# Patient Record
Sex: FEMALE | Race: BLACK OR AFRICAN AMERICAN | NOT HISPANIC OR LATINO | Employment: OTHER | URBAN - METROPOLITAN AREA
[De-identification: names, ages, dates, MRNs, and addresses within clinical notes are randomized per-mention and may not be internally consistent; named-entity substitution may affect disease eponyms.]

---

## 2017-02-02 ENCOUNTER — HOSPITAL ENCOUNTER (EMERGENCY)
Facility: HOSPITAL | Age: 57
Discharge: HOME | End: 2017-02-02
Attending: EMERGENCY MEDICINE
Payer: COMMERCIAL

## 2017-02-02 VITALS
RESPIRATION RATE: 18 BRPM | WEIGHT: 293 LBS | DIASTOLIC BLOOD PRESSURE: 84 MMHG | HEART RATE: 98 BPM | SYSTOLIC BLOOD PRESSURE: 184 MMHG | OXYGEN SATURATION: 98 % | TEMPERATURE: 99 F | BODY MASS INDEX: 47.09 KG/M2 | HEIGHT: 66 IN

## 2017-02-02 DIAGNOSIS — M54.50 CHRONIC LOW BACK PAIN WITHOUT SCIATICA, UNSPECIFIED BACK PAIN LATERALITY: Primary | ICD-10-CM

## 2017-02-02 DIAGNOSIS — G89.29 CHRONIC LOW BACK PAIN WITHOUT SCIATICA, UNSPECIFIED BACK PAIN LATERALITY: Primary | ICD-10-CM

## 2017-02-02 PROCEDURE — 99283 EMERGENCY DEPT VISIT LOW MDM: CPT

## 2017-02-02 NOTE — ED AVS SNAPSHOT
[facility] -   [id number] [place] [id number]               Roslyn Conde   2017  9:43 PM   ED    Description:  Female : 1960   Department:  [facility] -            Your Care was Coordinated By:     Provider Role From To    Maribell Curry MD Attending Provider 17 2143 --      Reason for Visit     Back Pain           Diagnoses this Visit        Comments    Chronic low back pain without sciatica, unspecified back pain laterality    -  Primary       ED Disposition     ED Disposition Condition Comment    Discharge             To Do List           Follow-up Information     Follow up with [facility] In 4 days.    Contact information:    [facility] [address]  [phone]          Follow up with [facility].    Specialty:  Emergency Medicine    Why:  As needed, If symptoms worsen    Contact information:    [address] [place] [id number] [phone]      [facility]     [facility] -  Assistance  Registered nurses in the [facility] provide clinical advisement, health education, appointment booking, and other advisory services.  Call for this free service at [phone].             Medications           Message regarding Medications     Verify the changes and/or additions to your medication regime listed below are the same as discussed with your clinician today.  If any of these changes or additions are incorrect, please notify your healthcare provider.             Verify that the below list of medications is an accurate representation of the medications you are currently taking.  If none reported, the list may be blank. If incorrect, please contact your healthcare provider. Carry this list with you in case of emergency.           Current Medications     amlodipine (NORVASC) 5 MG tablet Take 5 mg by mouth. 1 Tablet  "Oral Every day    gabapentin (NEURONTIN) 300 MG capsule Take 300 mg by mouth 2 (two) times daily.    hydrochlorothiazide (HYDRODIURIL) 25 MG tablet Take 25 mg by mouth once daily.    hydrocodone-acetaminophen (LORTAB)  mg per tablet     ibuprofen-famotidine (DUEXIS) 800-26.6 mg Tab Take by mouth 3 (three) times daily.    losartan (COZAAR) 50 MG tablet Take 50 mg by mouth 2 (two) times daily.    naproxen (NAPROSYN) 500 MG tablet Take 1 tablet (500 mg total) by mouth 2 (two) times daily with meals.    predniSONE (DELTASONE) 20 MG tablet Take 20 mg by mouth once daily.    tizanidine (ZANAFLEX) 6 mg capsule Take 6 mg by mouth 3 (three) times daily as needed.           Clinical Reference Information           Your Vitals Were     BP Pulse Temp Resp Height Weight    188/88 (BP Location: Right arm, Patient Position: Sitting) 102 98.7 °F (37.1 °C) (Oral) 20 5' 6" (1.676 m) 147 kg (324 lb)    SpO2 BMI             96% 52.29 kg/m2         Allergies as of 2/2/2017     No Known Allergies      Immunizations Administered on Date of Encounter - 2/2/2017     None      ED Micro, Lab, POCT     None      ED Imaging Orders     Start Ordered       Status Ordering Provider    02/02/17 2152 02/02/17 2151  X-Ray Lumbar Spine Complete 5 View  1 time imaging      Final result         Discharge Instructions         Back Pain (Acute or Chronic)    Back pain is one of the most common problems. The good news is that most people feel better in 1 to 2 weeks, and most of the rest in 1 to 2 months. Most people can remain active.  People experience and describe pain differently; not everyone is the same.  · The pain can be sharp, stabbing, shooting, aching, cramping or burning.  · Movement, standing, bending, lifting, sitting, or walking may worsen pain.  · It can be localized to one spot or area, or it can be more generalized.  · It can spread or radiate upwards, to the front, or go down your arms or legs (sciatica).  · It can cause muscle " spasm.  Most of the time, mechanical problems with the muscles or spine cause the pain. Mechanical problems are usually caused by an injury to the muscles or ligaments. While illness can cause back pain, it is usually not caused by a serious illness. Mechanical problems include:   · Physical activity such as sports, exercise, work, or normal activity  · Overexertion, lifting, pushing, pulling incorrectly or too aggressively  · Sudden twisting, bending, or stretching from an accident, or accidental movement  · Poor posture  · Stretching or moving wrong, without noticing pain at the time  · Poor coordination, lack of regular exercise (check with your doctor about this)  · Spinal disc disease or arthritis  · Stress  Pain can also be related to pregnancy, or illness like appendicitis, bladder or kidney infections, pelvic infections, and many other things.  Acute back pain usually gets better in 1 to 2 weeks. Back pain related to disk disease, arthritis in the spinal joints or spinal stenosis (narrowing of the spinal canal) can become chronic and last for months or years.  Unless you had a physical injury (for example, a car accident or fall) X-rays are usually not needed for the initial evaluation of back pain. If pain continues and does not respond to medical treatment, X-rays and other tests may be needed.  Home care  Try these home care recommendations:  · When in bed, try to find a position of comfort. A firm mattress is best. Try lying flat on your back with pillows under your knees. You can also try lying on your side with your knees bent up towards your chest and a pillow between your knees.  · At first, do not try to stretch out the sore spots. If there is a strain, it is not like the good soreness you get after exercising without an injury. In this case, stretching may make it worse.  · Avoid prolong sitting, long car rides, or travel. This puts more stress on the lower back than standing or walking.  · During  the first 24 to 72 hours after an acute injury or flare up of chronic back pain, apply an ice pack to the painful area for 20 minutes and then remove it for 20 minutes. Do this over a period of 60 to 90 minutes or several times a day. This will reduce swelling and pain. Wrap the ice pack in a thin towel or plastic to protect your skin.  · You can start with ice, then switch to heat. Heat (hot shower, hot bath, or heating pad) reduces pain and works well for muscle spasms. Heat can be applied to the painful area for 20 minutes then remove it for 20 minutes. Do this over a period of 60 to 90 minutes or several times a day. Do not sleep on a heating pad. It can lead to skin burns or tissue damage.  · You can alternate ice and heat therapy. Talk with your doctor about the best treatment for your back pain.  · Therapeutic massage can help relax the back muscles without stretching them.  · Be aware of safe lifting methods and do not lift anything without stretching first.  Medicines  Talk to your doctor before using medicine, especially if you have other medical problems or are taking other medicines.  · You may use over-the-counter medicine as directed on the bottle to control pain, unless another pain medicine was prescribed. If you have chronic conditions like diabetes, liver or kidney disease, stomach ulcers, or gastrointestinal bleeding, or are taking blood thinners, talk to your doctor before taking any medicine.  · Be careful if you are given a prescription medicines, narcotics, or medicine for muscle spasms. They can cause drowsiness, affect your coordination, reflexes, and judgement. Do not drive or operate heavy machinery.  Follow-up care  Follow up with your healthcare provider, or as advised.   A radiologist will review any X-rays that were taken. Your provide will notify you of any new findings that may affect your care.  Call 911  Call emergency services if any of the following occur:  · Trouble  breathing  · Confusion  · Very drowsy or trouble awakening  · Fainting or loss of consciousness  · Rapid or very slow heart rate  · Loss of bowel or bladder control  When to seek medical advice  Call your healthcare provider right away if any of these occur:   · Pain becomes worse or spreads to your legs  · Weakness or numbness in one or both legs  · Numbness in the groin or genital area  Date Last Reviewed: 7/1/2016 © 2000-2016 [employer]. [address] [place] [address], [place] [address]. All rights reserved. This information is not intended as a substitute for professional medical care. Always follow your healthcare professional's instructions.          Back Care Tips    Caring for your back  These are things you can do to prevent a recurrence of acute back pain and to reduce symptoms from chronic back pain:  · Maintain a healthy weight. If you are overweight, losing weight will help most types of back pain.  · Exercise is an important part of recovery from most types of back pain. The muscles behind and in front of the spine support the back. This means strengthening both the back muscles and the abdominal muscles will provide better support for your spine.   · Swimming and brisk walking are good overall exercises to improve your fitness level.  · Practice safe lifting methods (below).  · Practice good posture when sitting, standing and walking. Avoid prolonged sitting. This puts more stress on the lower back than standing or walking.  · Wear quality shoes with sufficient arch support. Foot and ankle alignment can affect back symptoms. Women should avoid wearing high heels.  · Therapeutic massage can help relax the back muscles without stretching them.  · During the first 24 to 72 hours after an acute injury or flare-up of chronic back pain, apply an ice pack to the painful area for 20 minutes and then remove it for 20 minutes, over a period of 60 to 90 minutes, or several times a day. As a safety  precaution, do not use a heating pad at bedtime. Sleeping on a heating pad can lead to skin burns or tissue damage.  · You can alternate ice and heat therapies.  Medications  Talk to your healthcare provider before using medicines, especially if you have other medical problems or are taking other medicines.  · You may use acetaminophen or ibuprofen to control pain, unless your healthcare provider prescribed other pain medicine. If you have chronic conditions like diabetes, liver or kidney disease, stomach ulcers, or gastrointestinal bleeding, or are taking blood thinners, talk with your healthcare provider before taking any medicines.  · Be careful if you are given prescription pain medicines, narcotics, or medicine for muscle spasm. They can cause drowsiness, affect your coordination, reflexes, and judgment. Do not drive or operate heavy machinery while taking these types of medicines. Take prescription pain medicine only as prescribed by your healthcare provider.  Lumbar stretch  Here is a simple stretching exercise that will help relax muscle spasm and keep your back more limber. If exercise makes your back pain worse, dont do it.  · Lie on your back with your knees bent and both feet on the ground.  · Slowly raise your left knee to your chest as you flatten your lower back against the floor. Hold for 5 seconds.  · Relax and repeat the exercise with your right knee.  · Do 10 of these exercises for each leg.  Safe lifting method  · Dont bend over at the waist to lift an object off the floor.  Instead, bend your knees and hips in a squat.   · Keep your back and head upright  · Hold the object close to your body, directly in front of you.  · Straighten your legs to lift the object.   · Lower the object to the floor in the reverse fashion.  · If you must slide something across the floor, push it.  Posture tips  Sitting  Sit in chairs with straight backs or low-back support. Keep your knees lower than your hips,  with your feet flat on the floor.  When driving, sit up straight. Adjust the seat forward so you are not leaning toward the steering wheel.  A small pillow or rolled towel behind your lower back may help if you are driving long distances.   Standing  When standing for long periods, shift most of your weight to one leg at a time. Alternate legs every few minutes.   Sleeping  The best way to sleep is on your side with your knees bent. Put a low pillow under your head to support your neck in a neutral spine position. Avoid thick pillows that bend your neck to one side. Put a pillow between your legs to further relax your lower back. If you sleep on your back, put pillows under your knees to support your legs in a slightly flexed position. Use a firm mattress. If your mattress sags, replace it, or use a 1/2-inch plywood board under the mattress to add support.  Follow-up care  Follow up with your healthcare provider, or as advised.  If X-rays, a CT scan or an MRI scan were taken, they will be reviewed by a radiologist. You will be notified of any new findings that may affect your care.  Call 911  Seek emergency medical care if any of the following occur:  · Trouble breathing  · Confusion  · Very drowsy  · Fainting or loss of consciousness  · Rapid or very slow heart rate  · Loss of  bowel or bladder control  When to seek medical care  Call your healthcare provider if any of the following occur:  · Pain becomes worse or spreads to your arms or legs  · Weakness or numbness in one or both arms or legs  · Numbness in the groin area  Date Last Reviewed: 6/1/2016 © 2000-2016 [employer]. [address] [place] [address]. All rights reserved. This information is not intended as a substitute for professional medical care. Always follow your healthcare professional's instructions.          [facility] Sign-Up     Activating your [facility] account is as easy as 1-2-3!     1) Visit [email or web address], select Sign  Up Now, enter this activation code and your date of birth, then select Next.  [email or web address]  Expires: 3/19/2017 10:42 PM      2) Create a username and password to use when you visit [facility] in the future and select a security question in case you lose your password and select Next.    3) Enter your e-mail address and click Sign Up!    Additional Information  If you have questions, please e-mail [email or web address] or call [phone] to talk to our [facility] staff. Remember, [facility] is NOT to be used for urgent needs. For medical emergencies, dial 911.          [facility] complies with applicable Federal civil rights laws and does not discriminate on the basis of race, color, national origin, age, disability, or sex.        Language Assistance Services     ATTENTION: Language assistance services are available, free of charge. Please call [phone].      ATENCIÓN: Si habla matias, tiene a chavez disposición servicios gratuitos de asistencia lingüística. Llame al [phone].     HOOD Ý: N?u b?n nói Ti?ng Vi?t, có các d?ch v? h? tr? ngôn ng? mi?n phí dành cho b?n. G?i s? [phone].

## 2017-02-03 NOTE — ED PROVIDER NOTES
SCRIBE #1 NOTE: I, Izzy Ramirez, am scribing for, and in the presence of, Maribell Curry MD. I have scribed the entire note.      History      Chief Complaint   Patient presents with    Back Pain     chronic. denies injury       Review of patient's allergies indicates:  No Known Allergies     HPI   HPI    2/2/2017, 9:50 PM   History obtained from the patient      History of Present Illness: Roslyn Conde is a 56 y.o. female patient who presents to the Emergency Department for lower back pain which onset gradually 4 days ago. Symptoms are constant and moderate in severity.  The patient states she bent over to  an item when sxs onset. Pt has PMHx of hip replacement done at [facility] and was told she has a loose screw in her back. Sxs exacerbate when turning and bending over. No mitigating factors reported. No associated sxs. Patient denies any fever, neck pain, neck stiffness, HA, numbness, weakness, saddles anesthesia, leg swelling, abdominal pain, CP, SOB, flank pain, dysuria, hematuria, and all other sxs at this time. Prior Tx includes percocet and tramadol with no relief. No further complaints or concerns at this time.         Arrival mode: Personal vehicle    PCP: Primary Doctor No       Past Medical History:  Past Medical History   Diagnosis Date    Chronic back pain     Hypertension        Past Surgical History:  Past Surgical History   Procedure Laterality Date    Tonsillectomy      Hysterectomy      Back surgery      Tubal ligation      Ankle surgery       right         Family History:  Unknown    Social History:  Social History     Social History Main Topics    Smoking status: Never Smoker    Smokeless tobacco: Unknown    Alcohol use No    Drug use: No    Sexual activity: Unknown       ROS   Review of Systems   Constitutional: Negative for fever.   HENT: Negative for sore throat.    Respiratory: Negative for shortness of breath.    Cardiovascular: Negative for chest pain and leg  "swelling.   Gastrointestinal: Negative for abdominal pain, diarrhea, nausea and vomiting.   Genitourinary: Negative for dysuria, flank pain and hematuria.   Musculoskeletal: Positive for back pain (lower). Negative for neck pain and neck stiffness.        (-) saddles anesthesia    Skin: Negative for rash.   Neurological: Negative for dizziness, weakness, numbness and headaches.   Hematological: Does not bruise/bleed easily.       Physical Exam    Initial Vitals   BP Pulse Resp Temp SpO2   02/02/17 2115 02/02/17 2115 02/02/17 2115 02/02/17 2115 02/02/17 2115   188/88 102 20 98.7 °F (37.1 °C) 96 %      Physical Exam  Nursing Notes and Vital Signs Reviewed.  Constitutional: Patient is in no acute distress. Awake and alert. Obese  Head: Atraumatic. Normocephalic.  Eyes: PERRL. EOM intact. Conjunctivae are not pale. No scleral icterus.  ENT: Mucous membranes are moist. Oropharynx is clear and symmetric.    Neck: Supple. Full ROM. No lymphadenopathy.  Cardiovascular: Regular rate. Regular rhythm. No murmurs, rubs, or gallops. Distal pulses are 2+ and symmetric.  Pulmonary/Chest: No respiratory distress. Clear to auscultation bilaterally. No wheezing, rales, or rhonchi.  Abdominal: Soft and non-distended.  There is no tenderness.  No rebound, guarding, or rigidity.  Good bowel sounds.    Musculoskeletal: Moves all extremities. No obvious deformities. No edema. No calf tenderness. Pain with ROM. Normal gait.  Back - no deformity, NT, neg straight leg maneuver.  Skin: Warm and dry.  Neurological:  Normal speech.  No focal neurological deficits are appreciated.  DTR's 2 plus bilaterally.  Psychiatric: Normal affect. Good eye contact. Appropriate in content.    ED Course    Procedures  ED Vital Signs:  Vitals:    02/02/17 2115 02/02/17 2246   BP: (!) 188/88 (!) 184/84   Pulse: 102 98   Resp: 20 18   Temp: 98.7 °F (37.1 °C)    TempSrc: Oral    SpO2: 96% 98%   Weight: (!) 147 kg (324 lb)    Height: 5' 6" (1.676 m)  "         Imaging Results:  Imaging Results         X-Ray Lumbar Spine Complete 5 View (Final result) Result time:  02/02/17 22:40:26    Final result by Niurka Mills MD (02/02/17 22:40:26)    Impression:     Lumbar fusion.  No acute abnormalities.            Electronically signed by: NIURKA MILLS MD  Date:     02/02/17  Time:    22:40     Narrative:    EXAM:   [id number] LUMBAR SPINE COMPLETE 5 VIEW    CLINICAL HISTORY: Back pain  COMPARISON: No relevant priors    Findings: Lumbar fusion.  Vertebral body heights and alignment are maintained.No aggressive appearing osseous lesions.  Mild scoliosis.                      The Emergency Provider reviewed the vital signs and test results, which are outlined above.    ED Discussion     11:01 PM: Reassessed pt at this time. Discussed with pt all pertinent ED information and results. Discussed pt dx and plan of tx. Gave pt all f/u and return to the ED instructions. All questions and concerns were addressed at this time. Pt expresses understanding of information and instructions, and is comfortable with plan to discharge. Pt is stable for discharge.    Pre-hypertension/Hypertension: The pt has been informed that they may have pre-hypertension or hypertension based on a blood pressure reading in the ED. I recommend that the pt call the PCP listed on their discharge instructions or a physician of their choice this week to arrange f/u for further evaluation of possible pre-hypertension or hypertension.       ED Medication(s):  Medications - No data to display    Discharge Medication List as of 2/2/2017 10:42 PM          Follow-up Information     Follow up with [facility] In 4 days.    Contact information:    [address] [facility] [address] [phone]          Follow up with [facility].    Specialty:  Emergency Medicine    Why:  As needed, If symptoms worsen    Contact information:    [address] [facility]  [facility] [address]  [phone]            Medical Decision Making    Medical Decision Making:   Clinical Tests:   Radiological Study: Ordered and Reviewed           Scribe Attestation:   Scribe #1: I performed the above scribed service and the documentation accurately describes the services I performed. I attest to the accuracy of the note.    Attending:   Physician Attestation Statement for Scribe #1: I, Maribell Curry MD, personally performed the services described in this documentation, as scribed by Izzy Ramirez, in my presence, and it is both accurate and complete.          Clinical Impression       ICD-10-CM ICD-9-CM   1. Chronic low back pain without sciatica, unspecified back pain laterality M54.5 724.2    G89.29 338.29       Disposition:   Disposition: Discharged  Condition: Stable         Maribell Curry MD  02/03/17 0600       Maribell Curry MD  02/16/17 0029

## 2017-02-03 NOTE — DISCHARGE INSTRUCTIONS
Back Pain (Acute or Chronic)    Back pain is one of the most common problems. The good news is that most people feel better in 1 to 2 weeks, and most of the rest in 1 to 2 months. Most people can remain active.  People experience and describe pain differently; not everyone is the same.  · The pain can be sharp, stabbing, shooting, aching, cramping or burning.  · Movement, standing, bending, lifting, sitting, or walking may worsen pain.  · It can be localized to one spot or area, or it can be more generalized.  · It can spread or radiate upwards, to the front, or go down your arms or legs (sciatica).  · It can cause muscle spasm.  Most of the time, mechanical problems with the muscles or spine cause the pain. Mechanical problems are usually caused by an injury to the muscles or ligaments. While illness can cause back pain, it is usually not caused by a serious illness. Mechanical problems include:   · Physical activity such as sports, exercise, work, or normal activity  · Overexertion, lifting, pushing, pulling incorrectly or too aggressively  · Sudden twisting, bending, or stretching from an accident, or accidental movement  · Poor posture  · Stretching or moving wrong, without noticing pain at the time  · Poor coordination, lack of regular exercise (check with your doctor about this)  · Spinal disc disease or arthritis  · Stress  Pain can also be related to pregnancy, or illness like appendicitis, bladder or kidney infections, pelvic infections, and many other things.  Acute back pain usually gets better in 1 to 2 weeks. Back pain related to disk disease, arthritis in the spinal joints or spinal stenosis (narrowing of the spinal canal) can become chronic and last for months or years.  Unless you had a physical injury (for example, a car accident or fall) X-rays are usually not needed for the initial evaluation of back pain. If pain continues and does not respond to medical treatment, X-rays and other tests may be  needed.  Home care  Try these home care recommendations:  · When in bed, try to find a position of comfort. A firm mattress is best. Try lying flat on your back with pillows under your knees. You can also try lying on your side with your knees bent up towards your chest and a pillow between your knees.  · At first, do not try to stretch out the sore spots. If there is a strain, it is not like the good soreness you get after exercising without an injury. In this case, stretching may make it worse.  · Avoid prolong sitting, long car rides, or travel. This puts more stress on the lower back than standing or walking.  · During the first 24 to 72 hours after an acute injury or flare up of chronic back pain, apply an ice pack to the painful area for 20 minutes and then remove it for 20 minutes. Do this over a period of 60 to 90 minutes or several times a day. This will reduce swelling and pain. Wrap the ice pack in a thin towel or plastic to protect your skin.  · You can start with ice, then switch to heat. Heat (hot shower, hot bath, or heating pad) reduces pain and works well for muscle spasms. Heat can be applied to the painful area for 20 minutes then remove it for 20 minutes. Do this over a period of 60 to 90 minutes or several times a day. Do not sleep on a heating pad. It can lead to skin burns or tissue damage.  · You can alternate ice and heat therapy. Talk with your doctor about the best treatment for your back pain.  · Therapeutic massage can help relax the back muscles without stretching them.  · Be aware of safe lifting methods and do not lift anything without stretching first.  Medicines  Talk to your doctor before using medicine, especially if you have other medical problems or are taking other medicines.  · You may use over-the-counter medicine as directed on the bottle to control pain, unless another pain medicine was prescribed. If you have chronic conditions like diabetes, liver or kidney disease,  stomach ulcers, or gastrointestinal bleeding, or are taking blood thinners, talk to your doctor before taking any medicine.  · Be careful if you are given a prescription medicines, narcotics, or medicine for muscle spasms. They can cause drowsiness, affect your coordination, reflexes, and judgement. Do not drive or operate heavy machinery.  Follow-up care  Follow up with your healthcare provider, or as advised.   A radiologist will review any X-rays that were taken. Your provide will notify you of any new findings that may affect your care.  Call 911  Call emergency services if any of the following occur:  · Trouble breathing  · Confusion  · Very drowsy or trouble awakening  · Fainting or loss of consciousness  · Rapid or very slow heart rate  · Loss of bowel or bladder control  When to seek medical advice  Call your healthcare provider right away if any of these occur:   · Pain becomes worse or spreads to your legs  · Weakness or numbness in one or both legs  · Numbness in the groin or genital area  Date Last Reviewed: 7/1/2016 © 2000-2016 [employer]. [address] [place] [address]. All rights reserved. This information is not intended as a substitute for professional medical care. Always follow your healthcare professional's instructions.          Back Care Tips    Caring for your back  These are things you can do to prevent a recurrence of acute back pain and to reduce symptoms from chronic back pain:  · Maintain a healthy weight. If you are overweight, losing weight will help most types of back pain.  · Exercise is an important part of recovery from most types of back pain. The muscles behind and in front of the spine support the back. This means strengthening both the back muscles and the abdominal muscles will provide better support for your spine.   · Swimming and brisk walking are good overall exercises to improve your fitness level.  · Practice safe lifting methods  (below).  · Practice good posture when sitting, standing and walking. Avoid prolonged sitting. This puts more stress on the lower back than standing or walking.  · Wear quality shoes with sufficient arch support. Foot and ankle alignment can affect back symptoms. Women should avoid wearing high heels.  · Therapeutic massage can help relax the back muscles without stretching them.  · During the first 24 to 72 hours after an acute injury or flare-up of chronic back pain, apply an ice pack to the painful area for 20 minutes and then remove it for 20 minutes, over a period of 60 to 90 minutes, or several times a day. As a safety precaution, do not use a heating pad at bedtime. Sleeping on a heating pad can lead to skin burns or tissue damage.  · You can alternate ice and heat therapies.  Medications  Talk to your healthcare provider before using medicines, especially if you have other medical problems or are taking other medicines.  · You may use acetaminophen or ibuprofen to control pain, unless your healthcare provider prescribed other pain medicine. If you have chronic conditions like diabetes, liver or kidney disease, stomach ulcers, or gastrointestinal bleeding, or are taking blood thinners, talk with your healthcare provider before taking any medicines.  · Be careful if you are given prescription pain medicines, narcotics, or medicine for muscle spasm. They can cause drowsiness, affect your coordination, reflexes, and judgment. Do not drive or operate heavy machinery while taking these types of medicines. Take prescription pain medicine only as prescribed by your healthcare provider.  Lumbar stretch  Here is a simple stretching exercise that will help relax muscle spasm and keep your back more limber. If exercise makes your back pain worse, dont do it.  · Lie on your back with your knees bent and both feet on the ground.  · Slowly raise your left knee to your chest as you flatten your lower back against the  floor. Hold for 5 seconds.  · Relax and repeat the exercise with your right knee.  · Do 10 of these exercises for each leg.  Safe lifting method  · Dont bend over at the waist to lift an object off the floor.  Instead, bend your knees and hips in a squat.   · Keep your back and head upright  · Hold the object close to your body, directly in front of you.  · Straighten your legs to lift the object.   · Lower the object to the floor in the reverse fashion.  · If you must slide something across the floor, push it.  Posture tips  Sitting  Sit in chairs with straight backs or low-back support. Keep your knees lower than your hips, with your feet flat on the floor.  When driving, sit up straight. Adjust the seat forward so you are not leaning toward the steering wheel.  A small pillow or rolled towel behind your lower back may help if you are driving long distances.   Standing  When standing for long periods, shift most of your weight to one leg at a time. Alternate legs every few minutes.   Sleeping  The best way to sleep is on your side with your knees bent. Put a low pillow under your head to support your neck in a neutral spine position. Avoid thick pillows that bend your neck to one side. Put a pillow between your legs to further relax your lower back. If you sleep on your back, put pillows under your knees to support your legs in a slightly flexed position. Use a firm mattress. If your mattress sags, replace it, or use a 1/2-inch plywood board under the mattress to add support.  Follow-up care  Follow up with your healthcare provider, or as advised.  If X-rays, a CT scan or an MRI scan were taken, they will be reviewed by a radiologist. You will be notified of any new findings that may affect your care.  Call 911  Seek emergency medical care if any of the following occur:  · Trouble breathing  · Confusion  · Very drowsy  · Fainting or loss of consciousness  · Rapid or very slow heart rate  · Loss of  bowel or  bladder control  When to seek medical care  Call your healthcare provider if any of the following occur:  · Pain becomes worse or spreads to your arms or legs  · Weakness or numbness in one or both arms or legs  · Numbness in the groin area  Date Last Reviewed: 6/1/2016  © 6746-4424 [employer]. [address] [place] [address], [place] [address]. All rights reserved. This information is not intended as a substitute for professional medical care. Always follow your healthcare professional's instructions.

## 2018-03-06 PROBLEM — E66.01 MORBID OBESITY: Status: ACTIVE | Noted: 2018-03-06

## 2018-03-06 PROBLEM — I15.2 HYPERTENSION ASSOCIATED WITH DIABETES: Status: ACTIVE | Noted: 2018-03-06

## 2018-03-06 PROBLEM — G89.29 CHRONIC LEFT-SIDED LOW BACK PAIN WITH RIGHT-SIDED SCIATICA: Status: ACTIVE | Noted: 2018-03-06

## 2018-03-06 PROBLEM — E11.9 TYPE 2 DIABETES MELLITUS WITHOUT COMPLICATION, WITHOUT LONG-TERM CURRENT USE OF INSULIN: Status: ACTIVE | Noted: 2018-03-06

## 2018-03-06 PROBLEM — E11.59 HYPERTENSION ASSOCIATED WITH DIABETES: Status: ACTIVE | Noted: 2018-03-06

## 2018-03-06 PROBLEM — M54.41 CHRONIC LEFT-SIDED LOW BACK PAIN WITH RIGHT-SIDED SCIATICA: Status: ACTIVE | Noted: 2018-03-06

## 2018-05-07 ENCOUNTER — HOSPITAL ENCOUNTER (EMERGENCY)
Facility: HOSPITAL | Age: 58
Discharge: HOME | End: 2018-05-08
Attending: EMERGENCY MEDICINE
Payer: COMMERCIAL

## 2018-05-07 DIAGNOSIS — K52.9 ENTERITIS: Primary | ICD-10-CM

## 2018-05-07 DIAGNOSIS — R10.9 ABDOMINAL PAIN: ICD-10-CM

## 2018-05-07 LAB
BASOPHILS # BLD AUTO: 0.04 K/UL
BASOPHILS NFR BLD: 0.4 %
DIFFERENTIAL METHOD: ABNORMAL
EOSINOPHIL # BLD AUTO: 0.2 K/UL
EOSINOPHIL NFR BLD: 2.2 %
ERYTHROCYTE [DISTWIDTH] IN BLOOD BY AUTOMATED COUNT: 13.6 %
HCT VFR BLD AUTO: 40.3 %
HGB BLD-MCNC: 13 G/DL
LYMPHOCYTES # BLD AUTO: 1.6 K/UL
LYMPHOCYTES NFR BLD: 17.1 %
MCH RBC QN AUTO: 28.6 PG
MCHC RBC AUTO-ENTMCNC: 32.3 G/DL
MCV RBC AUTO: 89 FL
MONOCYTES # BLD AUTO: 0.4 K/UL
MONOCYTES NFR BLD: 4.2 %
NEUTROPHILS # BLD AUTO: 7 K/UL
NEUTROPHILS NFR BLD: 76.1 %
PLATELET # BLD AUTO: 283 K/UL
PMV BLD AUTO: 9.2 FL
RBC # BLD AUTO: 4.55 M/UL
WBC # BLD AUTO: 9.24 K/UL

## 2018-05-07 PROCEDURE — 93010 ELECTROCARDIOGRAM REPORT: CPT | Mod: ,,, | Performed by: INTERNAL MEDICINE

## 2018-05-07 PROCEDURE — 63600175 PHARM REV CODE 636 W HCPCS: Performed by: EMERGENCY MEDICINE

## 2018-05-07 PROCEDURE — 83690 ASSAY OF LIPASE: CPT

## 2018-05-07 PROCEDURE — 85025 COMPLETE CBC W/AUTO DIFF WBC: CPT

## 2018-05-07 PROCEDURE — 99285 EMERGENCY DEPT VISIT HI MDM: CPT | Mod: 25

## 2018-05-07 PROCEDURE — 80053 COMPREHEN METABOLIC PANEL: CPT

## 2018-05-07 PROCEDURE — 96375 TX/PRO/DX INJ NEW DRUG ADDON: CPT

## 2018-05-07 PROCEDURE — 96374 THER/PROPH/DIAG INJ IV PUSH: CPT

## 2018-05-07 PROCEDURE — 96376 TX/PRO/DX INJ SAME DRUG ADON: CPT

## 2018-05-07 PROCEDURE — 25500020 PHARM REV CODE 255: Performed by: EMERGENCY MEDICINE

## 2018-05-07 RX ORDER — ONDANSETRON 2 MG/ML
4 INJECTION INTRAMUSCULAR; INTRAVENOUS
Status: COMPLETED | OUTPATIENT
Start: 2018-05-07 | End: 2018-05-07

## 2018-05-07 RX ORDER — MORPHINE SULFATE 4 MG/ML
6 INJECTION, SOLUTION INTRAMUSCULAR; INTRAVENOUS
Status: COMPLETED | OUTPATIENT
Start: 2018-05-07 | End: 2018-05-07

## 2018-05-07 RX ADMIN — IOHEXOL 30 ML: 350 INJECTION, SOLUTION INTRAVENOUS at 11:05

## 2018-05-07 RX ADMIN — ONDANSETRON 4 MG: 2 INJECTION INTRAMUSCULAR; INTRAVENOUS at 11:05

## 2018-05-07 RX ADMIN — MORPHINE SULFATE 6 MG: 4 INJECTION INTRAVENOUS at 11:05

## 2018-05-08 VITALS
SYSTOLIC BLOOD PRESSURE: 150 MMHG | WEIGHT: 293 LBS | OXYGEN SATURATION: 100 % | HEIGHT: 66 IN | RESPIRATION RATE: 18 BRPM | BODY MASS INDEX: 47.09 KG/M2 | TEMPERATURE: 99 F | DIASTOLIC BLOOD PRESSURE: 83 MMHG | HEART RATE: 78 BPM

## 2018-05-08 LAB
ALBUMIN SERPL BCP-MCNC: 3.7 G/DL
ALP SERPL-CCNC: 129 U/L
ALT SERPL W/O P-5'-P-CCNC: 14 U/L
ANION GAP SERPL CALC-SCNC: 9 MMOL/L
AST SERPL-CCNC: 15 U/L
BACTERIA #/AREA URNS HPF: NORMAL /HPF
BILIRUB SERPL-MCNC: 1.1 MG/DL
BILIRUB UR QL STRIP: NEGATIVE
BUN SERPL-MCNC: 16 MG/DL
CALCIUM SERPL-MCNC: 9.8 MG/DL
CHLORIDE SERPL-SCNC: 96 MMOL/L
CLARITY UR: CLEAR
CO2 SERPL-SCNC: 36 MMOL/L
COLOR UR: YELLOW
CREAT SERPL-MCNC: 1.2 MG/DL
EST. GFR  (AFRICAN AMERICAN): 58 ML/MIN/1.73 M^2
EST. GFR  (NON AFRICAN AMERICAN): 50 ML/MIN/1.73 M^2
GLUCOSE SERPL-MCNC: 145 MG/DL
GLUCOSE UR QL STRIP: NEGATIVE
HGB UR QL STRIP: NEGATIVE
KETONES UR QL STRIP: NEGATIVE
LEUKOCYTE ESTERASE UR QL STRIP: ABNORMAL
LIPASE SERPL-CCNC: 14 U/L
MICROSCOPIC COMMENT: NORMAL
NITRITE UR QL STRIP: NEGATIVE
PH UR STRIP: 8 [PH] (ref 5–8)
POTASSIUM SERPL-SCNC: 3.2 MMOL/L
PROT SERPL-MCNC: 8 G/DL
PROT UR QL STRIP: NEGATIVE
RBC #/AREA URNS HPF: 0 /HPF (ref 0–4)
SODIUM SERPL-SCNC: 141 MMOL/L
SP GR UR STRIP: 1.01 (ref 1–1.03)
SQUAMOUS #/AREA URNS HPF: 4 /HPF
URN SPEC COLLECT METH UR: ABNORMAL
UROBILINOGEN UR STRIP-ACNC: ABNORMAL EU/DL
WBC #/AREA URNS HPF: 0 /HPF (ref 0–5)

## 2018-05-08 PROCEDURE — 25000003 PHARM REV CODE 250: Performed by: EMERGENCY MEDICINE

## 2018-05-08 PROCEDURE — 63600175 PHARM REV CODE 636 W HCPCS: Performed by: EMERGENCY MEDICINE

## 2018-05-08 PROCEDURE — 81000 URINALYSIS NONAUTO W/SCOPE: CPT

## 2018-05-08 RX ORDER — CIPROFLOXACIN 500 MG/1
500 TABLET ORAL 2 TIMES DAILY
Qty: 20 TABLET | Refills: 0 | Status: SHIPPED | OUTPATIENT
Start: 2018-05-08 | End: 2018-05-18

## 2018-05-08 RX ORDER — HYDROCODONE BITARTRATE AND ACETAMINOPHEN 10; 325 MG/1; MG/1
1 TABLET ORAL
Status: COMPLETED | OUTPATIENT
Start: 2018-05-08 | End: 2018-05-08

## 2018-05-08 RX ORDER — HYDROCODONE BITARTRATE AND ACETAMINOPHEN 7.5; 325 MG/1; MG/1
1 TABLET ORAL EVERY 4 HOURS PRN
Qty: 18 TABLET | Refills: 0 | Status: SHIPPED | OUTPATIENT
Start: 2018-05-08 | End: 2019-10-31

## 2018-05-08 RX ORDER — ONDANSETRON 4 MG/1
4 TABLET, FILM COATED ORAL EVERY 6 HOURS
Qty: 12 TABLET | Refills: 0 | Status: SHIPPED | OUTPATIENT
Start: 2018-05-08 | End: 2019-10-31

## 2018-05-08 RX ORDER — METRONIDAZOLE 500 MG/1
500 TABLET ORAL 3 TIMES DAILY
Qty: 21 TABLET | Refills: 0 | Status: SHIPPED | OUTPATIENT
Start: 2018-05-08 | End: 2018-05-15

## 2018-05-08 RX ORDER — CIPROFLOXACIN 500 MG/1
500 TABLET ORAL
Status: COMPLETED | OUTPATIENT
Start: 2018-05-08 | End: 2018-05-08

## 2018-05-08 RX ORDER — MORPHINE SULFATE 4 MG/ML
4 INJECTION, SOLUTION INTRAMUSCULAR; INTRAVENOUS
Status: COMPLETED | OUTPATIENT
Start: 2018-05-08 | End: 2018-05-08

## 2018-05-08 RX ORDER — METRONIDAZOLE 500 MG/1
500 TABLET ORAL
Status: COMPLETED | OUTPATIENT
Start: 2018-05-08 | End: 2018-05-08

## 2018-05-08 RX ADMIN — METRONIDAZOLE 500 MG: 500 TABLET ORAL at 03:05

## 2018-05-08 RX ADMIN — MORPHINE SULFATE 4 MG: 4 INJECTION INTRAVENOUS at 01:05

## 2018-05-08 RX ADMIN — CIPROFLOXACIN HYDROCHLORIDE 500 MG: 500 TABLET, FILM COATED ORAL at 03:05

## 2018-05-08 RX ADMIN — HYDROCODONE BITARTRATE AND ACETAMINOPHEN 1 TABLET: 10; 325 TABLET ORAL at 03:05

## 2018-05-08 NOTE — ED PROVIDER NOTES
SCRIBE #1 NOTE: I, [employer], am scribing for, and in the presence of, Cindy Krueger Do, MD. I have scribed the entire note.      History      Chief Complaint   Patient presents with    Abdominal Pain     sharp stabbing upper abd pain that began about 3hrs pta; +nausea, 1 emesis, burping       Review of patient's allergies indicates:  No Known Allergies     HPI   HPI    5/7/2018, 10:51 PM   History obtained from the patient      History of Present Illness: Roslyn Conde is a 57 y.o. female DM Type II patient who presents to the Emergency Department for abd pain that radiates to her back which onset suddenly one hour pta. Pt reports having similar sxs 2-3 years ago that subsided on its own. Pt stated she did see GI during that time and was scoped with negative findings. Symptoms are constant and moderate in severity. No mitigating or exacerbating factors reported. Associated sxs include n/v. Patient denies any fever, chills, constipation, hematochezia, diarrhea, dysuria, hematuria, urinary frequency/urgency, and all other sxs at this time. No prior Tx reported. No further complaints or concerns at this time.         Arrival mode: Personal vehicle    PCP: Abhijit De MD       Past Medical History:  Past Medical History:   Diagnosis Date    Chronic back pain     Diabetes mellitus, type 2     Hypertension        Past Surgical History:  Past Surgical History:   Procedure Laterality Date    ANKLE SURGERY      right    BACK SURGERY      HYSTERECTOMY      TONSILLECTOMY      TOTAL HIP ARTHROPLASTY Left     TUBAL LIGATION           Family History:  Family hx reviewed not pertinent.     Social History:  Social History     Social History Main Topics    Smoking status: Never Smoker    Smokeless tobacco: Never Used    Alcohol use No    Drug use: No    Sexual activity: Unknown       ROS   Review of Systems   Constitutional: Negative for chills and fever.   HENT: Negative for sore throat.    Respiratory:  Negative for shortness of breath.    Cardiovascular: Negative for chest pain.   Gastrointestinal: Positive for abdominal pain, nausea and vomiting. Negative for blood in stool, constipation and diarrhea.   Genitourinary: Negative for dysuria, frequency, hematuria and urgency.   Musculoskeletal: Negative for back pain.   Skin: Negative for rash.   Neurological: Negative for weakness and numbness.   Hematological: Does not bruise/bleed easily.   All other systems reviewed and are negative.    Physical Exam      Initial Vitals [05/07/18 2235]   BP Pulse Resp Temp SpO2   (!) 175/71 92 20 98.6 °F (37 °C) 95 %      MAP       105.67          Physical Exam  Nursing Notes and Vital Signs Reviewed.  Constitutional: Patient is in mild distress. Well-developed and well-nourished.  Head: Atraumatic. Normocephalic.  Eyes: PERRL. EOM intact. Conjunctivae are not pale. No scleral icterus.  ENT: Mucous membranes are moist. Oropharynx is clear and symmetric.    Neck: Supple. Full ROM. No lymphadenopathy.  Cardiovascular: Regular rate. Regular rhythm. No murmurs, rubs, or gallops. Distal pulses are 2+ and symmetric.  Pulmonary/Chest: No respiratory distress. Clear to auscultation bilaterally. No wheezing or rales.  Abdominal: Soft and non-distended.  There is epigastric tenderness.  No rebound, guarding, or rigidity. Good bowel sounds.  Genitourinary: No CVA tenderness  Musculoskeletal: Moves all extremities. No obvious deformities. No edema. No calf tenderness.  Skin: Warm and dry.  Neurological:  Alert, awake, and appropriate.  Normal speech.  No acute focal neurological deficits are appreciated.  Psychiatric: Normal affect. Good eye contact. Appropriate in content.    ED Course    Procedures  ED Vital Signs:  Vitals:    05/07/18 2235 05/08/18 0042 05/08/18 0340   BP: (!) 175/71 (!) 157/83 (!) 150/83   Pulse: 92 96 78   Resp: 20 20 18   Temp: 98.6 °F (37 °C) 98.5 °F (36.9 °C) 98.6 °F (37 °C)   TempSrc: Oral Oral Oral   SpO2: 95% 98%  "100%   Weight: (!) 140 kg (308 lb 10.3 oz)     Height: 5' 6" (1.676 m)         Abnormal Lab Results:  Labs Reviewed   CBC W/ AUTO DIFFERENTIAL - Abnormal; Notable for the following:        Result Value    Gran% 76.1 (*)     Lymph% 17.1 (*)     All other components within normal limits   COMPREHENSIVE METABOLIC PANEL - Abnormal; Notable for the following:     Potassium 3.2 (*)     CO2 36 (*)     Glucose 145 (*)     Total Bilirubin 1.1 (*)     eGFR if  58 (*)     eGFR if non  50 (*)     All other components within normal limits   URINALYSIS - Abnormal; Notable for the following:     Urobilinogen, UA 2.0-3.0 (*)     Leukocytes, UA Trace (*)     All other components within normal limits   LIPASE   URINALYSIS MICROSCOPIC        All Lab Results:  Results for orders placed or performed during the hospital encounter of 05/07/18   CBC W/ AUTO DIFFERENTIAL   Result Value Ref Range    WBC 9.24 3.90 - 12.70 K/uL    RBC 4.55 4.00 - 5.40 M/uL    Hemoglobin 13.0 12.0 - 16.0 g/dL    Hematocrit 40.3 37.0 - 48.5 %    MCV 89 82 - 98 fL    MCH 28.6 27.0 - 31.0 pg    MCHC 32.3 32.0 - 36.0 g/dL    RDW 13.6 11.5 - 14.5 %    Platelets 283 150 - 350 K/uL    MPV 9.2 9.2 - 12.9 fL    Gran # (ANC) 7.0 1.8 - 7.7 K/uL    Lymph # 1.6 1.0 - 4.8 K/uL    Mono # 0.4 0.3 - 1.0 K/uL    Eos # 0.2 0.0 - 0.5 K/uL    Baso # 0.04 0.00 - 0.20 K/uL    Gran% 76.1 (H) 38.0 - 73.0 %    Lymph% 17.1 (L) 18.0 - 48.0 %    Mono% 4.2 4.0 - 15.0 %    Eosinophil% 2.2 0.0 - 8.0 %    Basophil% 0.4 0.0 - 1.9 %    Differential Method Automated    Comp. Metabolic Panel   Result Value Ref Range    Sodium 141 136 - 145 mmol/L    Potassium 3.2 (L) 3.5 - 5.1 mmol/L    Chloride 96 95 - 110 mmol/L    CO2 36 (H) 23 - 29 mmol/L    Glucose 145 (H) 70 - 110 mg/dL    BUN, Bld 16 6 - 20 mg/dL    Creatinine 1.2 0.5 - 1.4 mg/dL    Calcium 9.8 8.7 - 10.5 mg/dL    Total Protein 8.0 6.0 - 8.4 g/dL    Albumin 3.7 3.5 - 5.2 g/dL    Total Bilirubin 1.1 (H) 0.1 - " 1.0 mg/dL    Alkaline Phosphatase 129 55 - 135 U/L    AST 15 10 - 40 U/L    ALT 14 10 - 44 U/L    Anion Gap 9 8 - 16 mmol/L    eGFR if African American 58 (A) >60 mL/min/1.73 m^2    eGFR if non African American 50 (A) >60 mL/min/1.73 m^2   Lipase   Result Value Ref Range    Lipase 14 4 - 60 U/L   Urinalysis - Clean Catch   Result Value Ref Range    Specimen UA Urine, Clean Catch     Color, UA Yellow Yellow, Straw, Breanna    Appearance, UA Clear Clear    pH, UA 8.0 5.0 - 8.0    Specific Gravity, UA 1.015 1.005 - 1.030    Protein, UA Negative Negative    Glucose, UA Negative Negative    Ketones, UA Negative Negative    Bilirubin (UA) Negative Negative    Occult Blood UA Negative Negative    Nitrite, UA Negative Negative    Urobilinogen, UA 2.0-3.0 (A) <2.0 EU/dL    Leukocytes, UA Trace (A) Negative   Urinalysis Microscopic   Result Value Ref Range    RBC, UA 0 0 - 4 /hpf    WBC, UA 0 0 - 5 /hpf    Bacteria, UA None None-Occ /hpf    Squam Epithel, UA 4 /hpf    Microscopic Comment SEE COMMENT          Imaging Results:  Per [facility], pt's CT results segmental wall thickening involving a loop of distal small bowel in the lower abd may represent infectious or inflammatory bowel disease.     The EKG was ordered, reviewed, and independently interpreted by the ED provider.  Interpretation time: 2244  Rate: 86 BPM  Rhythm: normal sinus rhythm  Interpretation: Nonspecific T wave abnormality. Prolonged QT. No STEMI.           The Emergency Provider reviewed the vital signs and test results, which are outlined above.    ED Discussion     3:25 AM: Reassessed pt at this time.  Pt states her condition has improved at this time. Discussed with pt all pertinent ED information and results. Discussed pt dx and plan of tx. Gave pt all f/u and return to the ED instructions. All questions and concerns were addressed at this time. Pt expresses understanding of information and instructions, and is comfortable with plan to discharge.  Pt is stable for discharge.    I discussed with patient and/or family/caretaker that evaluation in the ED does not suggest any emergent or life threatening medical conditions requiring immediate intervention beyond what was provided in the ED, and I believe patient is safe for discharge.  Regardless, an unremarkable evaluation in the ED does not preclude the development or presence of a serious of life threatening condition. As such, patient was instructed to return immediately for any worsening or change in current symptoms.      ED Medication(s):  Medications   morphine injection 6 mg (6 mg Intravenous Given 5/7/18 2329)   ondansetron injection 4 mg (4 mg Intravenous Given 5/7/18 2329)   omnipaque 350 iohexol 30 mL (30 mLs Oral Given 5/7/18 2315)   morphine injection 4 mg (4 mg Intravenous Given 5/8/18 0145)   ciprofloxacin HCl tablet 500 mg (500 mg Oral Given 5/8/18 0338)   metroNIDAZOLE tablet 500 mg (500 mg Oral Given 5/8/18 0338)   hydrocodone-acetaminophen 10-325mg per tablet 1 tablet (1 tablet Oral Given 5/8/18 0339)       New Prescriptions    CIPROFLOXACIN HCL (CIPRO) 500 MG TABLET    Take 1 tablet (500 mg total) by mouth 2 (two) times daily.    HYDROCODONE-ACETAMINOPHEN 7.5-325MG (NORCO) 7.5-325 MG PER TABLET    Take 1 tablet by mouth every 4 (four) hours as needed for Pain.    METRONIDAZOLE (FLAGYL) 500 MG TABLET    Take 1 tablet (500 mg total) by mouth 3 (three) times daily.    ONDANSETRON (ZOFRAN) 4 MG TABLET    Take 1 tablet (4 mg total) by mouth every 6 (six) hours.       Follow-up Information     Abhijit De MD In 2 days.    Specialty:  Family Medicine  Contact information:  [address] [facility]  [address]  [facility] [id number] [phone]                     Medical Decision Making    Medical Decision Making:   Clinical Tests:   Lab Tests: Ordered and Reviewed  Radiological Study: Ordered and Reviewed  Medical Tests: Ordered and Reviewed           Scribe Attestation:   Scribe #1: I performed the  above scribed service and the documentation accurately describes the services I performed. I attest to the accuracy of the note.    Attending:   Physician Attestation Statement for Scribe #1: I, Cindy Krueger Do, MD, personally performed the services described in this documentation, as scribed by [employer], in my presence, and it is both accurate and complete.          Clinical Impression       ICD-10-CM ICD-9-CM   1. Enteritis K52.9 558.9   2. Abdominal pain R10.9 789.00       Disposition:   Disposition: Discharged  Condition: Stable         Cindy Krueger Do, MD  05/08/18 8706

## 2018-05-20 ENCOUNTER — HOSPITAL ENCOUNTER (EMERGENCY)
Facility: HOSPITAL | Age: 58
Discharge: HOME | End: 2018-05-20
Payer: COMMERCIAL

## 2018-05-20 VITALS
HEIGHT: 66 IN | WEIGHT: 293 LBS | RESPIRATION RATE: 20 BRPM | SYSTOLIC BLOOD PRESSURE: 155 MMHG | DIASTOLIC BLOOD PRESSURE: 85 MMHG | BODY MASS INDEX: 47.09 KG/M2 | HEART RATE: 76 BPM | OXYGEN SATURATION: 96 % | TEMPERATURE: 98 F

## 2018-05-20 DIAGNOSIS — S39.012A LUMBAR STRAIN, INITIAL ENCOUNTER: Primary | ICD-10-CM
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
## 2018-05-20 PROCEDURE — 99283 EMERGENCY DEPT VISIT LOW MDM: CPT | Mod: 25

## 2018-05-21 NOTE — ED PROVIDER NOTES
History      Chief Complaint   Patient presents with    Fall     pt c/o sharp back pain after falling earlier today; pt reports plate and screws in back       Review of patient's allergies indicates:  No Known Allergies     HPI   HPI    5/20/2018, 8:11 PM   History obtained from the patient      History of Present Illness: Roslyn Conde is a 57 y.o. female patient who presents to the Emergency Department for low back pain since trip and fall earlier today.  She is in pain management and took ibuprofen and percocet just pta. Symptoms are constant and moderate in severity.  The patient describes the symptoms as achy.  Denies bladder/bowel dysfunction, fever, saddle anesthesia, or focal weakness.  No mitigating or exacerbating factors reported.   No further complaints or concerns at this time.           PCP: Abhijit De MD       Past Medical History:  Past Medical History:   Diagnosis Date    Chronic back pain     Diabetes mellitus, type 2     Hypertension          Past Surgical History:  Past Surgical History:   Procedure Laterality Date    ANKLE SURGERY      right    BACK SURGERY      HYSTERECTOMY      TONSILLECTOMY      TOTAL HIP ARTHROPLASTY Left     TUBAL LIGATION             Family History:  History reviewed. No pertinent family history.        Social History:  Social History     Social History Main Topics    Smoking status: Never Smoker    Smokeless tobacco: Never Used    Alcohol use No    Drug use: No    Sexual activity: Not on file       ROS   Review of Systems   Constitutional: Negative for chills and fever.   HENT: Negative for sore throat.    Respiratory: Negative for shortness of breath.    Cardiovascular: Negative for chest pain.   Gastrointestinal: Negative for nausea and vomiting.   Genitourinary: Negative for decreased urine volume, difficulty urinating, dysuria and flank pain.   Musculoskeletal: Positive for back pain. Negative for neck stiffness.   Skin: Negative for rash and  "wound.   Neurological: Negative for weakness and numbness.   Hematological: Does not bruise/bleed easily.   All other systems reviewed and are negative.    Review of Systems    Physical Exam      Initial Vitals [05/20/18 1916]   BP Pulse Resp Temp SpO2   (!) 155/85 76 20 98.4 °F (36.9 °C) 96 %      MAP       108.33         Physical Exam  Vital signs and nursing notes reviewed.  Constitutional: Patient is in NAD. Awake and alert. Well-developed and well-nourished.  Head: Atraumatic. Normocephalic.  Eyes: PERRL. EOM intact. Conjunctivae nl. No scleral icterus.  ENT: Mucous membranes are moist. Oropharynx is clear.  Neck: Supple. No JVD. No lymphadenopathy.  No meningismus.  Nontender  Cardiovascular: Regular rate and rhythm. No murmurs, rubs, or gallops. Distal pulses are 2+ and symmetric.  Pulmonary/Chest: No respiratory distress. Clear to auscultation bilaterally. No wheezing, rales, or rhonchi.  Abdominal: Soft. Non-distended. No TTP. No rebound, guarding, or rigidity. Good bowel sounds.  Genitourinary: No CVA tenderness  Musculoskeletal: Moves all extremities. No edema.   Non tender c/t spine.  Lumbar spine with midline and bilateral paraspinous ttp, no  step.  Skin: Warm and dry.  Neurological: Awake and alert. No acute focal neurological deficits are appreciated.  5/5 x 4 strength.  Strong and equal foot plantar and dorsiflexion.  Psychiatric: Normal affect. Good eye contact. Appropriate in content.      ED Course      Procedures  ED Vital Signs:  Vitals:    05/20/18 1916   BP: (!) 155/85   Pulse: 76   Resp: 20   Temp: 98.4 °F (36.9 °C)   TempSrc: Oral   SpO2: 96%   Weight: (!) 140 kg (308 lb 10.3 oz)   Height: 5' 6" (1.676 m)                 Imaging Results:  Imaging Results          X-Ray Lumbar Spine Ap And Lateral (Final result)  Result time 05/20/18 20:49:28    Final result by Damon Martin MD (05/20/18 20:49:28)                 Impression:      Stable exam with no acute findings.      Electronically " signed by: Damon Martin MD  Date:    05/20/2018  Time:    20:49             Narrative:    EXAMINATION:  XR LUMBAR SPINE AP AND LATERAL    CLINICAL HISTORY:  trip fall;  Fall on same level from slipping, tripping and stumbling without subsequent striking against object, initial encounter    TECHNIQUE:  Routine radiographs obtained.    COMPARISON:  February 2, 2017    FINDINGS:  Stable postoperative changes with multilevel lumbar fusions.    Stable degenerative changes with multilevel spondylosis.    No acute fractures.  Hardware is intact.                                   The Emergency Provider reviewed the vital signs and test results, which are outlined above.    ED Discussion         Medication(s) given in the ER:  Medications - No data to display        Follow-up Information     Abhijit De MD In 2 days.    Specialty:  Family Medicine  Contact information:  [address] [facility]  [address]  [facility] [address] [phone]                       New Prescriptions    No medications on file          Medical Decision Making        All findings were reviewed with the patient/family in detail.   All remaining questions and concerns were addressed at that time.  Patient/family has been counseled regarding the need for follow-up as well as the indication to return to the emergency room should new or worrisome developments occur.          MDM               Clinical Impression:        ICD-10-CM ICD-9-CM   1. Lumbar strain, initial encounter S39.012A 847.2   2. Fall from slip, trip, or stumble, initial encounter W01.0XXA E885.9             Deirdre Arriaga PA-C  05/20/18 2133

## 2019-02-15 ENCOUNTER — HOSPITAL ENCOUNTER (EMERGENCY)
Facility: HOSPITAL | Age: 59
Discharge: HOME | End: 2019-02-15
Attending: EMERGENCY MEDICINE
Payer: COMMERCIAL

## 2019-02-15 VITALS
HEIGHT: 66 IN | OXYGEN SATURATION: 99 % | BODY MASS INDEX: 47.09 KG/M2 | WEIGHT: 293 LBS | SYSTOLIC BLOOD PRESSURE: 191 MMHG | DIASTOLIC BLOOD PRESSURE: 92 MMHG | RESPIRATION RATE: 14 BRPM | TEMPERATURE: 98 F | HEART RATE: 78 BPM

## 2019-02-15 DIAGNOSIS — M54.50 LOW BACK PAIN: ICD-10-CM

## 2019-02-15 PROCEDURE — 99284 EMERGENCY DEPT VISIT MOD MDM: CPT | Mod: 25

## 2019-02-15 PROCEDURE — 63600175 PHARM REV CODE 636 W HCPCS: Performed by: PHYSICIAN ASSISTANT

## 2019-02-15 PROCEDURE — 25000003 PHARM REV CODE 250: Performed by: PHYSICIAN ASSISTANT

## 2019-02-15 PROCEDURE — 96372 THER/PROPH/DIAG INJ SC/IM: CPT

## 2019-02-15 RX ORDER — ONDANSETRON 4 MG/1
4 TABLET, ORALLY DISINTEGRATING ORAL
Status: COMPLETED | OUTPATIENT
Start: 2019-02-15 | End: 2019-02-15

## 2019-02-15 RX ORDER — ORPHENADRINE CITRATE 30 MG/ML
60 INJECTION INTRAMUSCULAR; INTRAVENOUS
Status: COMPLETED | OUTPATIENT
Start: 2019-02-15 | End: 2019-02-15

## 2019-02-15 RX ORDER — MORPHINE SULFATE 10 MG/ML
8 INJECTION INTRAMUSCULAR; INTRAVENOUS; SUBCUTANEOUS
Status: COMPLETED | OUTPATIENT
Start: 2019-02-15 | End: 2019-02-15

## 2019-02-15 RX ORDER — MORPHINE SULFATE 10 MG/ML
4 INJECTION INTRAMUSCULAR; INTRAVENOUS; SUBCUTANEOUS
Status: DISCONTINUED | OUTPATIENT
Start: 2019-02-15 | End: 2019-02-15

## 2019-02-15 RX ADMIN — MORPHINE SULFATE 8 MG: 10 INJECTION, SOLUTION INTRAMUSCULAR; INTRAVENOUS at 07:02

## 2019-02-15 RX ADMIN — ORPHENADRINE CITRATE 60 MG: 30 INJECTION INTRAMUSCULAR; INTRAVENOUS at 07:02

## 2019-02-15 RX ADMIN — ONDANSETRON 4 MG: 4 TABLET, ORALLY DISINTEGRATING ORAL at 07:02

## 2019-02-16 NOTE — ED NOTES
Patient educated on discharge instructions and prescriptions by Deirdre PHELAN without nursing assistance. Patient discharged to [facility] by PA.

## 2019-02-16 NOTE — ED PROVIDER NOTES
History      Chief Complaint   Patient presents with    Back Pain     right sided low back pain, radiates to right buttock       Review of patient's allergies indicates:  No Known Allergies     HPI   HPI    2/15/2019, 6:34 PM   History obtained from the patient      History of Present Illness: Roslyn Conde is a 58 y.o. female patient who presents to the Emergency Department for flare of chronic low back pain.  She is in pain management and took percocet 10mg with minimal relief.  Pain is right lower back into right hip.  Symptoms are constant and moderate in severity.  The patient describes the symptoms as achy.  Denies bladder/bowel dysfunction, fever, saddle anesthesia, or focal weakness.   No further complaints or concerns at this time.           PCP: Abhijit De MD       Past Medical History:  Past Medical History:   Diagnosis Date    Chronic back pain     Diabetes mellitus, type 2     Hypertension          Past Surgical History:  Past Surgical History:   Procedure Laterality Date    ANKLE SURGERY      right    BACK SURGERY      HYSTERECTOMY      TONSILLECTOMY      TOTAL HIP ARTHROPLASTY Left     TUBAL LIGATION             Family History:  History reviewed. No pertinent family history.        Social History:  Social History     Tobacco Use    Smoking status: Never Smoker    Smokeless tobacco: Never Used   Substance and Sexual Activity    Alcohol use: No    Drug use: No    Sexual activity: Not on file       ROS   Review of Systems   Constitutional: Negative for chills and fever.   HENT: Negative for sore throat.    Respiratory: Negative for shortness of breath.    Cardiovascular: Negative for chest pain.   Gastrointestinal: Negative for nausea and vomiting.   Genitourinary: Negative for decreased urine volume, difficulty urinating, dysuria and flank pain.   Musculoskeletal: Positive for back pain. Negative for neck stiffness.   Skin: Negative for rash and wound.   Neurological: Negative  "for weakness and numbness.   Hematological: Does not bruise/bleed easily.   All other systems reviewed and are negative.    Review of Systems    Physical Exam      Initial Vitals [02/15/19 1803]   BP Pulse Resp Temp SpO2   (!) 191/92 78 14 98.1 °F (36.7 °C) 99 %      MAP       --         Physical Exam  Vital signs and nursing notes reviewed.  Constitutional: Patient is in NAD. Awake and alert. Well-developed and well-nourished.  Head: Atraumatic. Normocephalic.  Eyes: PERRL. EOM intact. Conjunctivae nl. No scleral icterus.  ENT: Mucous membranes are moist. Oropharynx is clear.  Neck: Supple. No JVD. No lymphadenopathy.  No meningismus.  Nontender  Cardiovascular: Regular rate and rhythm. No murmurs, rubs, or gallops. Distal pulses are 2+ and symmetric.  Pulmonary/Chest: No respiratory distress. Clear to auscultation bilaterally. No wheezing, rales, or rhonchi.  Abdominal: Soft. Non-distended. No TTP. No rebound, guarding, or rigidity. Good bowel sounds.  Genitourinary: No CVA tenderness  Musculoskeletal: Moves all extremities. No edema.   Non tender c/t spine.  Lumbar spine with mild bilateral paraspinous ttp, no midline ttp or step.  Skin: Warm and dry.  Neurological: Awake and alert. No acute focal neurological deficits are appreciated.  5/5 x 4 strength.  Strong and equal foot plantar and dorsiflexion.  Psychiatric: Normal affect. Good eye contact. Appropriate in content.      ED Course      Procedures  ED Vital Signs:  Vitals:    02/15/19 1803 02/15/19 1903   BP: (!) 191/92    Pulse: 78    Resp: 14    Temp: 98.1 °F (36.7 °C)    TempSrc: Oral    SpO2: 99%    Weight: (!) 142 kg (313 lb)    Height:  5' 6" (1.676 m)                 Imaging Results:  Imaging Results          X-Ray Hip 2 View Right (Final result)  Result time 02/15/19 19:30:34    Final result by Dino Gatse Jr., MD (02/15/19 19:30:34)                 Impression:      No acute findings.      Electronically signed by: Dino Gates, " MD  Date:    02/15/2019  Time:    19:30             Narrative:    EXAMINATION:  XR HIP 2 VIEW RIGHT    CLINICAL HISTORY:  Low back pain.  Hip pain    COMPARISON:  No comparison studies are available.    FINDINGS:  Bone alignment is satisfactory.  No fracture or dislocation.  No advanced arthritic change.  No significant soft tissue findings.                               X-Ray Lumbar Spine Ap And Lateral (Final result)  Result time 02/15/19 19:30:04    Final result by Dino Gates Jr., MD (02/15/19 19:30:04)                 Impression:      No acute findings.      Electronically signed by: Dino Gates MD  Date:    02/15/2019  Time:    19:30             Narrative:    EXAMINATION:  XR LUMBAR SPINE AP AND LATERAL    CLINICAL HISTORY:  Low back pain, prior surgery, new or progressive sx;Low back pain    COMPARISON:  05/20/2018    FINDINGS:  Five non rib bearing lumbar segments.  Dextroscoliosis, similar to before.  Fusion changes throughout the lumbar spine, similar to before.  Multilevel laminectomy changes.  No acute fractures.  No detrimental change.                                   The Emergency Provider reviewed the vital signs and test results, which are outlined above.    ED Discussion         Medication(s) given in the ER:  Medications   orphenadrine injection 60 mg (60 mg Intramuscular Given 2/15/19 1903)   ondansetron disintegrating tablet 4 mg (4 mg Oral Given 2/15/19 1903)   morphine injection 8 mg (8 mg Intramuscular Given 2/15/19 1904)           Follow-up Information     Abhijit De MD In 2 days.    Specialty:  Family Medicine  Contact information:  [address]  [address]  [facility] [address] [phone]                       Discharge Medication List as of 2/15/2019  7:38 PM             Medical Decision Making        All findings were reviewed with the patient/family in detail.   All remaining questions and concerns were addressed at that time.  Patient/family has been counseled  regarding the need for follow-up as well as the indication to return to the emergency room should new or worrisome developments occur.          MDM               Clinical Impression:        ICD-10-CM ICD-9-CM   1. Low back pain M54.5 724.2   2. Low back pain M54.5 724.2             Deirdre Arriaga PA-C  02/15/19 1945

## 2019-10-31 ENCOUNTER — HOSPITAL ENCOUNTER (EMERGENCY)
Facility: HOSPITAL | Age: 59
Discharge: HOME | End: 2019-10-31
Attending: EMERGENCY MEDICINE
Payer: COMMERCIAL

## 2019-10-31 VITALS
OXYGEN SATURATION: 95 % | DIASTOLIC BLOOD PRESSURE: 70 MMHG | HEIGHT: 66 IN | RESPIRATION RATE: 15 BRPM | TEMPERATURE: 99 F | BODY MASS INDEX: 50.52 KG/M2 | HEART RATE: 92 BPM | SYSTOLIC BLOOD PRESSURE: 155 MMHG

## 2019-10-31 DIAGNOSIS — R68.83 CHILLS: ICD-10-CM

## 2019-10-31 DIAGNOSIS — M79.10 MYALGIA: Primary | ICD-10-CM

## 2019-10-31 LAB
ALBUMIN SERPL BCP-MCNC: 3.6 G/DL (ref 3.5–5.2)
ALP SERPL-CCNC: 108 U/L (ref 55–135)
ALT SERPL W/O P-5'-P-CCNC: 8 U/L (ref 10–44)
ANION GAP SERPL CALC-SCNC: 13 MMOL/L (ref 8–16)
AST SERPL-CCNC: 14 U/L (ref 10–40)
BASOPHILS # BLD AUTO: 0.02 K/UL (ref 0–0.2)
BASOPHILS NFR BLD: 0.3 % (ref 0–1.9)
BILIRUB SERPL-MCNC: 1.4 MG/DL (ref 0.1–1)
BILIRUB UR QL STRIP: NEGATIVE
BUN SERPL-MCNC: 9 MG/DL (ref 6–20)
CALCIUM SERPL-MCNC: 10.1 MG/DL (ref 8.7–10.5)
CHLORIDE SERPL-SCNC: 106 MMOL/L (ref 95–110)
CLARITY UR: CLEAR
CO2 SERPL-SCNC: 23 MMOL/L (ref 23–29)
COLOR UR: YELLOW
CREAT SERPL-MCNC: 1 MG/DL (ref 0.5–1.4)
DIFFERENTIAL METHOD: ABNORMAL
EOSINOPHIL # BLD AUTO: 0.1 K/UL (ref 0–0.5)
EOSINOPHIL NFR BLD: 0.7 % (ref 0–8)
ERYTHROCYTE [DISTWIDTH] IN BLOOD BY AUTOMATED COUNT: 13.6 % (ref 11.5–14.5)
EST. GFR  (AFRICAN AMERICAN): >60 ML/MIN/1.73 M^2
EST. GFR  (NON AFRICAN AMERICAN): >60 ML/MIN/1.73 M^2
GLUCOSE SERPL-MCNC: 131 MG/DL (ref 70–110)
GLUCOSE UR QL STRIP: NEGATIVE
HCT VFR BLD AUTO: 40.5 % (ref 37–48.5)
HGB BLD-MCNC: 12.9 G/DL (ref 12–16)
HGB UR QL STRIP: NEGATIVE
IMM GRANULOCYTES # BLD AUTO: 0.04 K/UL (ref 0–0.04)
IMM GRANULOCYTES NFR BLD AUTO: 0.5 % (ref 0–0.5)
INFLUENZA A, MOLECULAR: NEGATIVE
INFLUENZA B, MOLECULAR: NEGATIVE
KETONES UR QL STRIP: NEGATIVE
LEUKOCYTE ESTERASE UR QL STRIP: ABNORMAL
LIPASE SERPL-CCNC: 11 U/L (ref 4–60)
LYMPHOCYTES # BLD AUTO: 1.2 K/UL (ref 1–4.8)
LYMPHOCYTES NFR BLD: 16.1 % (ref 18–48)
MCH RBC QN AUTO: 27.2 PG (ref 27–31)
MCHC RBC AUTO-ENTMCNC: 31.9 G/DL (ref 32–36)
MCV RBC AUTO: 85 FL (ref 82–98)
MICROSCOPIC COMMENT: NORMAL
MONOCYTES # BLD AUTO: 0.4 K/UL (ref 0.3–1)
MONOCYTES NFR BLD: 4.6 % (ref 4–15)
NEUTROPHILS # BLD AUTO: 6 K/UL (ref 1.8–7.7)
NEUTROPHILS NFR BLD: 77.8 % (ref 38–73)
NITRITE UR QL STRIP: NEGATIVE
NRBC BLD-RTO: 0 /100 WBC
PH UR STRIP: 8 [PH] (ref 5–8)
PLATELET # BLD AUTO: 311 K/UL (ref 150–350)
PMV BLD AUTO: 9.1 FL (ref 9.2–12.9)
POTASSIUM SERPL-SCNC: 4.1 MMOL/L (ref 3.5–5.1)
PROT SERPL-MCNC: 7.8 G/DL (ref 6–8.4)
PROT UR QL STRIP: NEGATIVE
RBC # BLD AUTO: 4.74 M/UL (ref 4–5.4)
SODIUM SERPL-SCNC: 142 MMOL/L (ref 136–145)
SP GR UR STRIP: 1.01 (ref 1–1.03)
SPECIMEN SOURCE: NORMAL
TROPONIN I SERPL DL<=0.01 NG/ML-MCNC: <0.006 NG/ML (ref 0–0.03)
URN SPEC COLLECT METH UR: ABNORMAL
UROBILINOGEN UR STRIP-ACNC: NEGATIVE EU/DL
WBC # BLD AUTO: 7.66 K/UL (ref 3.9–12.7)
WBC #/AREA URNS HPF: 0 /HPF (ref 0–5)

## 2019-10-31 PROCEDURE — 99284 EMERGENCY DEPT VISIT MOD MDM: CPT | Mod: 25

## 2019-10-31 PROCEDURE — 96374 THER/PROPH/DIAG INJ IV PUSH: CPT

## 2019-10-31 PROCEDURE — 83690 ASSAY OF LIPASE: CPT

## 2019-10-31 PROCEDURE — 84484 ASSAY OF TROPONIN QUANT: CPT

## 2019-10-31 PROCEDURE — 80053 COMPREHEN METABOLIC PANEL: CPT

## 2019-10-31 PROCEDURE — 63600175 PHARM REV CODE 636 W HCPCS: Performed by: EMERGENCY MEDICINE

## 2019-10-31 PROCEDURE — 81000 URINALYSIS NONAUTO W/SCOPE: CPT

## 2019-10-31 PROCEDURE — 25000003 PHARM REV CODE 250: Performed by: EMERGENCY MEDICINE

## 2019-10-31 PROCEDURE — 85025 COMPLETE CBC W/AUTO DIFF WBC: CPT

## 2019-10-31 PROCEDURE — 87502 INFLUENZA DNA AMP PROBE: CPT

## 2019-10-31 PROCEDURE — 96361 HYDRATE IV INFUSION ADD-ON: CPT

## 2019-10-31 RX ORDER — ACETAMINOPHEN 500 MG
1000 TABLET ORAL
Status: COMPLETED | OUTPATIENT
Start: 2019-10-31 | End: 2019-10-31

## 2019-10-31 RX ORDER — ONDANSETRON 2 MG/ML
8 INJECTION INTRAMUSCULAR; INTRAVENOUS
Status: COMPLETED | OUTPATIENT
Start: 2019-10-31 | End: 2019-10-31

## 2019-10-31 RX ORDER — ONDANSETRON 2 MG/ML
8 INJECTION INTRAMUSCULAR; INTRAVENOUS
Status: DISCONTINUED | OUTPATIENT
Start: 2019-10-31 | End: 2019-10-31

## 2019-10-31 RX ADMIN — ONDANSETRON 8 MG: 2 INJECTION INTRAMUSCULAR; INTRAVENOUS at 03:10

## 2019-10-31 RX ADMIN — SODIUM CHLORIDE 1000 ML: 0.9 INJECTION, SOLUTION INTRAVENOUS at 02:10

## 2019-10-31 RX ADMIN — ACETAMINOPHEN 1000 MG: 500 TABLET ORAL at 05:10

## 2019-10-31 NOTE — ED NOTES
"Patient refusing lab draw for troponin. States "I am not doing anymore blood work. Tell the doctor I'm cold and I want to go home so discharge me"  "

## 2019-10-31 NOTE — ED PROVIDER NOTES
"SCRIBE #1 NOTE: I, Larisa Ribera, am scribing for, and in the presence of, Alfa Rodriguez Jr., MD. I have scribed the HPI, ROS, and PEx.     SCRIBE #2 NOTE: I, Mica Thomas, am scribing for, and in the presence of,  Scottie Huang MD. I have scribed the remaining portions of the note not scribed by Scribe #1.      History     Chief Complaint   Patient presents with    Generalized Body Aches     body aches x 3 times. frequent urination, diarrhea, "I have a smell in my nose that makes it difficult to breath"     Chest Pain     patient states she started feeling a tightness and heaviness in her chest while sitting in triage.      Review of patient's allergies indicates:  No Known Allergies      History of Present Illness     HPI    10/31/2019, 2:34 PM  History obtained from the patient      History of Present Illness: Roslyn Conde is a 59 y.o. female patient with a PMHx of chronic back pain, DM, and HTN who presents to the Emergency Department for evaluation of generalized myalgias which onset gradually 3 days ago. Symptoms are constant and moderate in severity.  No mitigating or exacerbating factors reported. Associated sxs include diarrhea (1 episode), urinary frequency, and chills. Patient denies any fever, nausea, vomiting, cough, sore throat, rhinorrhea, leg swelling ,CP, SOB, and all other sxs at this time. No prior Tx. Patient has not had flu shot this year. No further complaints or concerns at this time.     Arrival mode: Personal vehicle    PCP: Jed Munoz MD        Past Medical History:  Past Medical History:   Diagnosis Date    Chronic back pain     Diabetes mellitus, type 2     Hypertension        Past Surgical History:  Past Surgical History:   Procedure Laterality Date    ANKLE SURGERY      right    BACK SURGERY      HYSTERECTOMY      TONSILLECTOMY      TOTAL HIP ARTHROPLASTY Left     TUBAL LIGATION           Family History:  History reviewed. No pertinent family history. "     Social History:  Social History     Tobacco Use    Smoking status: Never Smoker    Smokeless tobacco: Never Used   Substance and Sexual Activity    Alcohol use: No    Drug use: No    Sexual activity: unknown        Review of Systems     Review of Systems   Constitutional: Positive for chills. Negative for activity change, appetite change, diaphoresis, fatigue and fever.   HENT: Negative for congestion, ear pain, nosebleeds, rhinorrhea, sinus pain, sore throat and trouble swallowing.    Eyes: Negative for pain and discharge.   Respiratory: Negative for cough, chest tightness, shortness of breath, wheezing and stridor.    Cardiovascular: Negative for chest pain, palpitations and leg swelling.   Gastrointestinal: Positive for diarrhea. Negative for abdominal distention, abdominal pain, blood in stool, constipation, nausea and vomiting.   Genitourinary: Positive for frequency. Negative for difficulty urinating, dysuria, flank pain, hematuria and urgency.   Musculoskeletal: Positive for myalgias (generalized). Negative for arthralgias, back pain and neck pain.   Skin: Negative for pallor, rash and wound.   Neurological: Negative for dizziness, syncope, weakness, light-headedness, numbness and headaches.   Hematological: Does not bruise/bleed easily.   Psychiatric/Behavioral: Negative for confusion and self-injury.   All other systems reviewed and are negative.     Physical Exam     Initial Vitals [10/31/19 1357]   BP Pulse Resp Temp SpO2   (!) 152/79 86 17 98.5 °F (36.9 °C) 98 %      MAP       --          Physical Exam  Nursing Notes and Vital Signs Reviewed.  Constitutional: Patient is in no acute distress. Well-developed and well-nourished.  Head: Atraumatic. Normocephalic.  Eyes: PERRL. EOM intact. Conjunctivae are not pale. No scleral icterus.  ENT: Mucous membranes are moist. Oropharynx is clear and symmetric.    Neck: Supple. Full ROM. No lymphadenopathy.  Cardiovascular: Regular rate. Regular rhythm. No  "murmurs, rubs, or gallops. Distal pulses are 2+ and symmetric.  Pulmonary/Chest: No respiratory distress. Clear to auscultation bilaterally. No wheezing or rales.  Abdominal: Soft and non-distended.  There is no tenderness.  No rebound, guarding, or rigidity. Good bowel sounds.  Genitourinary: No CVA tenderness  Musculoskeletal: Moves all extremities. No obvious deformities. No edema. No calf tenderness.  Skin: Warm and dry.  Neurological:  Alert, awake, and appropriate.  Normal speech.  No acute focal neurological deficits are appreciated.  Psychiatric: Normal affect. Good eye contact. Appropriate in content.     ED Course   Procedures  ED Vital Signs:  Vitals:    10/31/19 1357 10/31/19 1427 10/31/19 1441 10/31/19 1601   BP: (!) 152/79  134/64 (!) 155/70   Pulse: 86 79 76 92   Resp: 17  13 15   Temp: 98.5 °F (36.9 °C)      TempSrc: Oral      SpO2: 98%  97% 95%   Height: 5' 6" (1.676 m)          Abnormal Lab Results:  Labs Reviewed   CBC W/ AUTO DIFFERENTIAL - Abnormal; Notable for the following components:       Result Value    Mean Corpuscular Hemoglobin Conc 31.9 (*)     MPV 9.1 (*)     Gran% 77.8 (*)     Lymph% 16.1 (*)     All other components within normal limits   COMPREHENSIVE METABOLIC PANEL - Abnormal; Notable for the following components:    Glucose 131 (*)     Total Bilirubin 1.4 (*)     ALT 8 (*)     All other components within normal limits   URINALYSIS, REFLEX TO URINE CULTURE - Abnormal; Notable for the following components:    Leukocytes, UA Trace (*)     All other components within normal limits    Narrative:     Preferred Collection Type->Urine, Clean Catch   INFLUENZA A & B BY MOLECULAR   LIPASE   URINALYSIS MICROSCOPIC    Narrative:     Preferred Collection Type->Urine, Clean Catch   TROPONIN I   TROPONIN I        All Lab Results:  Results for orders placed or performed during the hospital encounter of 10/31/19   Influenza A & B by Molecular   Result Value Ref Range    Influenza A, Molecular " Negative Negative    Influenza B, Molecular Negative Negative    Flu A & B Source Nasal swab    CBC auto differential   Result Value Ref Range    WBC 7.66 3.90 - 12.70 K/uL    RBC 4.74 4.00 - 5.40 M/uL    Hemoglobin 12.9 12.0 - 16.0 g/dL    Hematocrit 40.5 37.0 - 48.5 %    Mean Corpuscular Volume 85 82 - 98 fL    Mean Corpuscular Hemoglobin 27.2 27.0 - 31.0 pg    Mean Corpuscular Hemoglobin Conc 31.9 (L) 32.0 - 36.0 g/dL    RDW 13.6 11.5 - 14.5 %    Platelets 311 150 - 350 K/uL    MPV 9.1 (L) 9.2 - 12.9 fL    Immature Granulocytes 0.5 0.0 - 0.5 %    Gran # (ANC) 6.0 1.8 - 7.7 K/uL    Immature Grans (Abs) 0.04 0.00 - 0.04 K/uL    Lymph # 1.2 1.0 - 4.8 K/uL    Mono # 0.4 0.3 - 1.0 K/uL    Eos # 0.1 0.0 - 0.5 K/uL    Baso # 0.02 0.00 - 0.20 K/uL    nRBC 0 0 /100 WBC    Gran% 77.8 (H) 38.0 - 73.0 %    Lymph% 16.1 (L) 18.0 - 48.0 %    Mono% 4.6 4.0 - 15.0 %    Eosinophil% 0.7 0.0 - 8.0 %    Basophil% 0.3 0.0 - 1.9 %    Differential Method Automated    Lipase   Result Value Ref Range    Lipase 11 4 - 60 U/L   Comprehensive metabolic panel   Result Value Ref Range    Sodium 142 136 - 145 mmol/L    Potassium 4.1 3.5 - 5.1 mmol/L    Chloride 106 95 - 110 mmol/L    CO2 23 23 - 29 mmol/L    Glucose 131 (H) 70 - 110 mg/dL    BUN, Bld 9 6 - 20 mg/dL    Creatinine 1.0 0.5 - 1.4 mg/dL    Calcium 10.1 8.7 - 10.5 mg/dL    Total Protein 7.8 6.0 - 8.4 g/dL    Albumin 3.6 3.5 - 5.2 g/dL    Total Bilirubin 1.4 (H) 0.1 - 1.0 mg/dL    Alkaline Phosphatase 108 55 - 135 U/L    AST 14 10 - 40 U/L    ALT 8 (L) 10 - 44 U/L    Anion Gap 13 8 - 16 mmol/L    eGFR if African American >60 >60 mL/min/1.73 m^2    eGFR if non African American >60 >60 mL/min/1.73 m^2   Urinalysis, Reflex to Urine Culture Urine, Clean Catch   Result Value Ref Range    Specimen UA Urine, Clean Catch     Color, UA Yellow Yellow, Straw, Breanna    Appearance, UA Clear Clear    pH, UA 8.0 5.0 - 8.0    Specific Gravity, UA 1.010 1.005 - 1.030    Protein, UA Negative Negative     Glucose, UA Negative Negative    Ketones, UA Negative Negative    Bilirubin (UA) Negative Negative    Occult Blood UA Negative Negative    Nitrite, UA Negative Negative    Urobilinogen, UA Negative <2.0 EU/dL    Leukocytes, UA Trace (A) Negative   Urinalysis Microscopic   Result Value Ref Range    WBC, UA 0 0 - 5 /hpf    Microscopic Comment SEE COMMENT          Imaging Results:  Imaging Results          X-Ray Chest AP Portable (Final result)  Result time 10/31/19 14:49:27    Final result by Scottie Ya MD (10/31/19 14:49:27)                 Impression:      No acute process seen.      Electronically signed by: Scottie Ya MD  Date:    10/31/2019  Time:    14:49             Narrative:    EXAMINATION:  XR CHEST AP PORTABLE    CLINICAL HISTORY:  body aches;    FINDINGS:  Single view of the chest.    Cardiac silhouette is normal.  The lungs demonstrate no evidence of active disease.  No evidence of pleural effusion or pneumothorax.  Bones demonstrate mild degenerative changes.                               The EKG was ordered, reviewed, and independently interpreted by the ED provider.  Interpretation time: 14:00  Rate: 89 BPM  Rhythm: normal sinus rhythm  Interpretation: Cannot rule out anterior infarct, age undetermined. T wave abnormality, consider inferior ischemia. Abnormal ECG. No STEMI.            The Emergency Provider reviewed the vital signs and test results, which are outlined above.     ED Discussion     4:00 PM: Dr. Rodriguez transfers care of pt to Dr. Huang, pending lab results.    5:18 PM: Reassessed pt at this time. Pt is refusing any more blood work and states she would like to go home. Pt reports her condition has improved at this time. Discussed with pt all pertinent ED information and results. Discussed pt dx and plan of tx. Gave pt all f/u and return to the ED instructions. All questions and concerns were addressed at this time. Pt expresses understanding of information and instructions, and  is comfortable with plan to discharge. Pt is stable for discharge.    I discussed with patient and/or family/caretaker that evaluation in the ED does not suggest any emergent or life threatening medical conditions requiring immediate intervention beyond what was provided in the ED, and I believe patient is safe for discharge.  Regardless, an unremarkable evaluation in the ED does not preclude the development or presence of a serious of life threatening condition. As such, patient was instructed to return immediately for any worsening or change in current symptoms.         Medical Decision Making:   Clinical Tests:   Lab Tests: Ordered and Reviewed  Radiological Study: Ordered and Reviewed  Medical Tests: Ordered and Reviewed  59-year-old female who presents to the emergency department with complaints myalgias and chills as her chief complaint; she also reported some urinary frequency and some diarrhea; she has had no diarrhea here in the emergency department; her temperature is 98.5; she has not taking anything for her body aches at home; Tylenol given; patient was initially evaluated by another provider; care was handed off to me with labs pending; review of the chart does show that patient reported some chest pain in triage; I discussed this chest pain with patient; it was atypical for ACS; the pain lasted for 10 sec; was not associated with nausea/vomiting, shortness of breath, diaphoresis, radiation of the pain; EKG was obtained.  EKG does show some T-wave inversions; prior EKG reviewed which shows nonspecific T-wave abnormalities; patient has no chest pain on my initial evaluation; I did see that no initial troponin was ordered, and had a discussion with patient that we cannot rule out MI without ordering a troponin; I advised getting a troponin here in the emergency department; it is now been 3 hr since her triage complaint 10 sec episode of chest pain; this will be a 3 hr troponin; patient understood that we  could not rule out myocardial infarction without a troponin, but she voiced a desire to leave the hospital against my advice; she understood the risks of leaving including undiagnosed myocardial infarction and death, and has elected to accept these risks; she states that she needs to be at home for her family; advised close PCP follow-up; ER return precautions provided           ED Medication(s):  Medications   sodium chloride 0.9% bolus 1,000 mL (0 mLs Intravenous Stopped 10/31/19 1714)   ondansetron injection 8 mg (8 mg Intravenous Given 10/31/19 1543)   acetaminophen tablet 1,000 mg (1,000 mg Oral Given 10/31/19 1713)       New Prescriptions    No medications on file       Follow-up Information     Schedule an appointment as soon as possible for a visit  with Jed Munoz MD.    Specialty:  Family Medicine  Contact information:  [address] [facility] [address] [phone]             [facility] - .    Specialty:  Emergency Medicine  Why:  As needed, If symptoms worsen  Contact information:  [address]  [facility] [address] [phone]                     Scribe Attestation:   Scribe #1: I performed the above scribed service and the documentation accurately describes the services I performed. I attest to the accuracy of the note.     Attending:   Physician Attestation Statement for Scribe #1: I, Alfa Rodriguez Jr., MD, personally performed the services described in this documentation, as scribed by Larisa Ribera, in my presence, and it is both accurate and complete.       Scribe Attestation:   Scribe #2: I performed the above scribed service and the documentation accurately describes the services I performed. I attest to the accuracy of the note.    Attending Attestation:           Physician Attestation for Scribe:    Physician Attestation Statement for Scribe #2: I, Scottie Huang MD, reviewed documentation, as scribed by Mica Thomas in my presence,  and it is both accurate and complete. I also acknowledge and confirm the content of the note done by Magdi #1.           Clinical Impression       ICD-10-CM ICD-9-CM   1. Myalgia M79.10 729.1   2. Chills R68.83 780.64       Disposition:   Disposition: Discharged  Condition: Stable  ]       Scottie Huang MD  10/31/19 3045

## 2022-01-01 ENCOUNTER — HOSPITAL ENCOUNTER (EMERGENCY)
Facility: HOSPITAL | Age: 62
Discharge: HOME | End: 2022-01-01
Attending: FAMILY MEDICINE
Payer: COMMERCIAL

## 2022-01-01 VITALS
BODY MASS INDEX: 47.09 KG/M2 | TEMPERATURE: 98 F | DIASTOLIC BLOOD PRESSURE: 81 MMHG | OXYGEN SATURATION: 91 % | HEIGHT: 66 IN | WEIGHT: 293 LBS | SYSTOLIC BLOOD PRESSURE: 146 MMHG | RESPIRATION RATE: 14 BRPM | HEART RATE: 80 BPM

## 2022-01-01 DIAGNOSIS — K52.9 GASTROENTERITIS: Primary | ICD-10-CM

## 2022-01-01 LAB
ALBUMIN SERPL BCP-MCNC: 3.5 G/DL (ref 3.5–5.2)
ALP SERPL-CCNC: 109 U/L (ref 55–135)
ALT SERPL W/O P-5'-P-CCNC: 16 U/L (ref 10–44)
ANION GAP SERPL CALC-SCNC: 13 MMOL/L (ref 8–16)
AST SERPL-CCNC: 26 U/L (ref 10–40)
BASOPHILS # BLD AUTO: 0.02 K/UL (ref 0–0.2)
BASOPHILS NFR BLD: 0.2 % (ref 0–1.9)
BILIRUB SERPL-MCNC: 1.8 MG/DL (ref 0.1–1)
BUN SERPL-MCNC: 7 MG/DL (ref 8–23)
CALCIUM SERPL-MCNC: 8.8 MG/DL (ref 8.7–10.5)
CHLORIDE SERPL-SCNC: 97 MMOL/L (ref 95–110)
CO2 SERPL-SCNC: 29 MMOL/L (ref 23–29)
CREAT SERPL-MCNC: 1 MG/DL (ref 0.5–1.4)
DIFFERENTIAL METHOD: ABNORMAL
EOSINOPHIL # BLD AUTO: 0.1 K/UL (ref 0–0.5)
EOSINOPHIL NFR BLD: 1 % (ref 0–8)
ERYTHROCYTE [DISTWIDTH] IN BLOOD BY AUTOMATED COUNT: 14.3 % (ref 11.5–14.5)
EST. GFR  (AFRICAN AMERICAN): >60 ML/MIN/1.73 M^2
EST. GFR  (NON AFRICAN AMERICAN): >60 ML/MIN/1.73 M^2
GLUCOSE SERPL-MCNC: 173 MG/DL (ref 70–110)
HCT VFR BLD AUTO: 47.9 % (ref 37–48.5)
HGB BLD-MCNC: 15 G/DL (ref 12–16)
IMM GRANULOCYTES # BLD AUTO: 0.04 K/UL (ref 0–0.04)
IMM GRANULOCYTES NFR BLD AUTO: 0.4 % (ref 0–0.5)
LIPASE SERPL-CCNC: 16 U/L (ref 4–60)
LYMPHOCYTES # BLD AUTO: 0.8 K/UL (ref 1–4.8)
LYMPHOCYTES NFR BLD: 7.5 % (ref 18–48)
MCH RBC QN AUTO: 27.6 PG (ref 27–31)
MCHC RBC AUTO-ENTMCNC: 31.3 G/DL (ref 32–36)
MCV RBC AUTO: 88 FL (ref 82–98)
MONOCYTES # BLD AUTO: 0.4 K/UL (ref 0.3–1)
MONOCYTES NFR BLD: 3.9 % (ref 4–15)
NEUTROPHILS # BLD AUTO: 8.7 K/UL (ref 1.8–7.7)
NEUTROPHILS NFR BLD: 87 % (ref 38–73)
NRBC BLD-RTO: 0 /100 WBC
PLATELET # BLD AUTO: 290 K/UL (ref 150–450)
PMV BLD AUTO: 8.5 FL (ref 9.2–12.9)
POTASSIUM SERPL-SCNC: 3.9 MMOL/L (ref 3.5–5.1)
PROT SERPL-MCNC: 7.3 G/DL (ref 6–8.4)
RBC # BLD AUTO: 5.44 M/UL (ref 4–5.4)
SODIUM SERPL-SCNC: 139 MMOL/L (ref 136–145)
WBC # BLD AUTO: 10.04 K/UL (ref 3.9–12.7)

## 2022-01-01 PROCEDURE — 25000003 PHARM REV CODE 250: Performed by: NURSE PRACTITIONER

## 2022-01-01 PROCEDURE — A9698 NON-RAD CONTRAST MATERIALNOC: HCPCS | Performed by: NURSE PRACTITIONER

## 2022-01-01 PROCEDURE — 25500020 PHARM REV CODE 255: Performed by: NURSE PRACTITIONER

## 2022-01-01 PROCEDURE — 25000003 PHARM REV CODE 250: Performed by: FAMILY MEDICINE

## 2022-01-01 PROCEDURE — 96374 THER/PROPH/DIAG INJ IV PUSH: CPT | Mod: 59

## 2022-01-01 PROCEDURE — 96361 HYDRATE IV INFUSION ADD-ON: CPT

## 2022-01-01 PROCEDURE — 63600175 PHARM REV CODE 636 W HCPCS: Performed by: NURSE PRACTITIONER

## 2022-01-01 PROCEDURE — 99284 EMERGENCY DEPT VISIT MOD MDM: CPT | Mod: 25

## 2022-01-01 PROCEDURE — 80053 COMPREHEN METABOLIC PANEL: CPT | Performed by: NURSE PRACTITIONER

## 2022-01-01 PROCEDURE — 96375 TX/PRO/DX INJ NEW DRUG ADDON: CPT

## 2022-01-01 PROCEDURE — 85025 COMPLETE CBC W/AUTO DIFF WBC: CPT | Performed by: NURSE PRACTITIONER

## 2022-01-01 PROCEDURE — 83690 ASSAY OF LIPASE: CPT | Performed by: NURSE PRACTITIONER

## 2022-01-01 RX ORDER — TRAMADOL HYDROCHLORIDE 50 MG/1
50 TABLET ORAL
Status: COMPLETED | OUTPATIENT
Start: 2022-01-01 | End: 2022-01-01

## 2022-01-01 RX ORDER — MORPHINE SULFATE 4 MG/ML
4 INJECTION, SOLUTION INTRAMUSCULAR; INTRAVENOUS
Status: COMPLETED | OUTPATIENT
Start: 2022-01-01 | End: 2022-01-01

## 2022-01-01 RX ORDER — ONDANSETRON 2 MG/ML
4 INJECTION INTRAMUSCULAR; INTRAVENOUS
Status: COMPLETED | OUTPATIENT
Start: 2022-01-01 | End: 2022-01-01

## 2022-01-01 RX ORDER — HYDROCODONE BITARTRATE AND ACETAMINOPHEN 10; 325 MG/1; MG/1
1 TABLET ORAL EVERY 4 HOURS PRN
Qty: 6 TABLET | Refills: 0 | Status: SHIPPED | OUTPATIENT
Start: 2022-01-01 | End: 2022-03-16

## 2022-01-01 RX ADMIN — TRAMADOL HYDROCHLORIDE 50 MG: 50 TABLET ORAL at 04:01

## 2022-01-01 RX ADMIN — IOHEXOL 100 ML: 350 INJECTION, SOLUTION INTRAVENOUS at 02:01

## 2022-01-01 RX ADMIN — ONDANSETRON 4 MG: 2 INJECTION INTRAMUSCULAR; INTRAVENOUS at 01:01

## 2022-01-01 RX ADMIN — IOHEXOL 500 ML: 9 SOLUTION ORAL at 02:01

## 2022-01-01 RX ADMIN — MORPHINE SULFATE 4 MG: 4 INJECTION INTRAVENOUS at 01:01

## 2022-01-01 RX ADMIN — SODIUM CHLORIDE 1000 ML: 0.9 INJECTION, SOLUTION INTRAVENOUS at 01:01

## 2022-01-01 NOTE — ED PROVIDER NOTES
Encounter Date: 1/1/2022    SCRIBE #1 NOTE: I, Dorothy Floewrs, am scribing for, and in the presence of,  Ratna Edge MD. I have scribed the following portions of the note - Other sections scribed: part of the ED Course and ED Discussion.       History     Chief Complaint   Patient presents with    Abdominal Pain     Pt CO abd pain x 5 hrs. Pt reports N/V no D     Pt. C/o generalized abdominal pain and N/V.     The history is provided by the patient.   Abdominal Pain  The current episode started today. The onset of the illness was abrupt. The problem has not changed since onset.The abdominal pain is generalized. The abdominal pain does not radiate. The abdominal pain is relieved by nothing. The abdominal pain is exacerbated by vomiting. The other symptoms of the illness include nausea and vomiting. The other symptoms of the illness do not include fever, jaundice, shortness of breath, diarrhea or dysuria.   Symptoms associated with the illness do not include back pain.     Review of patient's allergies indicates:  No Known Allergies  Past Medical History:   Diagnosis Date    Chronic back pain     Diabetes mellitus, type 2     Hypertension      Past Surgical History:   Procedure Laterality Date    ANKLE SURGERY      right    BACK SURGERY      HYSTERECTOMY      TONSILLECTOMY      TOTAL HIP ARTHROPLASTY Left     TUBAL LIGATION       History reviewed. No pertinent family history.  Social History     Tobacco Use    Smoking status: Never Smoker    Smokeless tobacco: Never Used   Substance Use Topics    Alcohol use: No    Drug use: No     Review of Systems   Constitutional: Negative for fever.   HENT: Negative for sore throat.    Respiratory: Negative for shortness of breath.    Cardiovascular: Negative for chest pain.   Gastrointestinal: Positive for abdominal pain, nausea and vomiting. Negative for diarrhea and jaundice.   Genitourinary: Negative for dysuria.   Musculoskeletal: Negative for back pain.    Skin: Negative for rash.   Neurological: Negative for weakness.   Hematological: Does not bruise/bleed easily.       Physical Exam     Initial Vitals [01/01/22 0015]   BP Pulse Resp Temp SpO2   (!) 155/69 90 16 97.7 °F (36.5 °C) 95 %      MAP       --         Physical Exam    Nursing note and vitals reviewed.  Constitutional: She appears well-developed and well-nourished.   HENT:   Head: Normocephalic and atraumatic.   Eyes: Conjunctivae and EOM are normal. Pupils are equal, round, and reactive to light.   Neck: Neck supple.   Normal range of motion.  Cardiovascular: Normal rate, regular rhythm, normal heart sounds and intact distal pulses.   Pulmonary/Chest: Breath sounds normal.   Abdominal: Abdomen is soft. There is abdominal tenderness in the periumbilical area. There is no rebound and no guarding.   Musculoskeletal:         General: Normal range of motion.      Cervical back: Normal range of motion and neck supple.     Neurological: She is alert and oriented to person, place, and time. She has normal strength and normal reflexes.   Skin: Skin is warm and dry.   Psychiatric: She has a normal mood and affect. Her behavior is normal. Thought content normal.         ED Course   Procedures  Labs Reviewed   CBC W/ AUTO DIFFERENTIAL - Abnormal; Notable for the following components:       Result Value    RBC 5.44 (*)     MCHC 31.3 (*)     MPV 8.5 (*)     Gran # (ANC) 8.7 (*)     Lymph # 0.8 (*)     Gran % 87.0 (*)     Lymph % 7.5 (*)     Mono % 3.9 (*)     All other components within normal limits   COMPREHENSIVE METABOLIC PANEL - Abnormal; Notable for the following components:    Glucose 173 (*)     BUN 7 (*)     Total Bilirubin 1.8 (*)     All other components within normal limits   LIPASE     All Lab Results:   Results for orders placed or performed during the hospital encounter of 01/01/22   CBC auto differential   Result Value Ref Range    WBC 10.04 3.90 - 12.70 K/uL    RBC 5.44 (H) 4.00 - 5.40 M/uL     Hemoglobin 15.0 12.0 - 16.0 g/dL    Hematocrit 47.9 37.0 - 48.5 %    MCV 88 82 - 98 fL    MCH 27.6 27.0 - 31.0 pg    MCHC 31.3 (L) 32.0 - 36.0 g/dL    RDW 14.3 11.5 - 14.5 %    Platelets 290 150 - 450 K/uL    MPV 8.5 (L) 9.2 - 12.9 fL    Immature Granulocytes 0.4 0.0 - 0.5 %    Gran # (ANC) 8.7 (H) 1.8 - 7.7 K/uL    Immature Grans (Abs) 0.04 0.00 - 0.04 K/uL    Lymph # 0.8 (L) 1.0 - 4.8 K/uL    Mono # 0.4 0.3 - 1.0 K/uL    Eos # 0.1 0.0 - 0.5 K/uL    Baso # 0.02 0.00 - 0.20 K/uL    nRBC 0 0 /100 WBC    Gran % 87.0 (H) 38.0 - 73.0 %    Lymph % 7.5 (L) 18.0 - 48.0 %    Mono % 3.9 (L) 4.0 - 15.0 %    Eosinophil % 1.0 0.0 - 8.0 %    Basophil % 0.2 0.0 - 1.9 %    Differential Method Automated    Comprehensive metabolic panel   Result Value Ref Range    Sodium 139 136 - 145 mmol/L    Potassium 3.9 3.5 - 5.1 mmol/L    Chloride 97 95 - 110 mmol/L    CO2 29 23 - 29 mmol/L    Glucose 173 (H) 70 - 110 mg/dL    BUN 7 (L) 8 - 23 mg/dL    Creatinine 1.0 0.5 - 1.4 mg/dL    Calcium 8.8 8.7 - 10.5 mg/dL    Total Protein 7.3 6.0 - 8.4 g/dL    Albumin 3.5 3.5 - 5.2 g/dL    Total Bilirubin 1.8 (H) 0.1 - 1.0 mg/dL    Alkaline Phosphatase 109 55 - 135 U/L    AST 26 10 - 40 U/L    ALT 16 10 - 44 U/L    Anion Gap 13 8 - 16 mmol/L    eGFR if African American >60 >60 mL/min/1.73 m^2    eGFR if non African American >60 >60 mL/min/1.73 m^2   Lipase   Result Value Ref Range    Lipase 16 4 - 60 U/L            Imaging Results          CT Abdomen Pelvis With Contrast (In process)               4:50 AM: Per STAT radiology, pt's CT ABDOMEN & PELVIS With Contrast results: IMPRESSION: Findings consistent with a severe small bowel enteritis which may be infectious or ischemic. Free fluid as described.      ED Discussion     3:00 AM: Rogerio Valdez NP transfers care of patient to Dr. Edge pending radiology results.    4:51 AM: Reassessed pt at this time. Discussed with pt all pertinent ED information and results. Discussed pt dx and plan of tx.  Gave pt all f/u and return to the ED instructions. All questions and concerns were addressed at this time. Pt expresses understanding of information and instructions, and is comfortable with plan to discharge. Pt is stable for discharge.    I discussed with patient and/or family/caretaker that evaluation in the ED does not suggest any emergent or life threatening medical conditions requiring immediate intervention beyond what was provided in the ED, and I believe patient is safe for discharge.  Regardless, an unremarkable evaluation in the ED does not preclude the development or presence of a serious of life threatening condition. As such, patient was instructed to return immediately for any worsening or change in current symptoms.    New Prescriptions    HYDROCODONE-ACETAMINOPHEN (NORCO)  MG PER TABLET    Take 1 tablet by mouth every 4 (four) hours as needed.        Medication List      START taking these medications    HYDROcodone-acetaminophen  mg per tablet  Commonly known as: NORCO  Take 1 tablet by mouth every 4 (four) hours as needed.        ASK your doctor about these medications    amLODIPine 5 MG tablet  Commonly known as: NORVASC  Take 1 tablet (5 mg total) by mouth once daily. 1 Tablet Oral Every day     hydroCHLOROthiazide 25 MG tablet  Commonly known as: HYDRODIURIL  Take 1 tablet (25 mg total) by mouth once daily.     losartan 50 MG tablet  Commonly known as: COZAAR  Take 1 tablet (50 mg total) by mouth once daily.     metFORMIN 500 MG tablet  Commonly known as: GLUCOPHAGE  Take with meals. Take 1 qam for 1 wk, then bid for 1 wk, then 2 am/1 pm for 1 wk, then 2 bid till finished.     pregabalin 75 MG capsule  Commonly known as: LYRICA           Where to Get Your Medications      You can get these medications from any pharmacy    Bring a paper prescription for each of these medications  · HYDROcodone-acetaminophen  mg per tablet                Medications   sodium chloride 0.9% bolus  1,000 mL (0 mLs Intravenous Stopped 1/1/22 0414)   ondansetron injection 4 mg (4 mg Intravenous Given 1/1/22 0141)   morphine injection 4 mg (4 mg Intravenous Given 1/1/22 0141)   iohexoL (OMNIPAQUE 350) injection 100 mL (100 mLs Intravenous Given 1/1/22 0213)   iohexoL (OMNIPAQUE 9) oral solution 500 mL (500 mLs Oral Given 1/1/22 0214)   traMADoL tablet 50 mg (50 mg Oral Given 1/1/22 0413)     Medical Decision Making:   Clinical Tests:   Lab Tests: Ordered and Reviewed  Radiological Study: Ordered and Reviewed          Scribe Attestation:   Scribe #1: I performed the above scribed service and the documentation accurately describes the services I performed. I attest to the accuracy of the note.    Attending Attestation:           Physician Attestation for Scribe:  Physician Attestation Statement for Scribe #1: I, Ratna Edge MD, reviewed documentation, as scribed by Dorothy Flowers in my presence, and it is both accurate and complete.                      Clinical Impression:   Final diagnoses:  [K52.9] Gastroenteritis (Primary)          ED Disposition Condition    Discharge Stable        ED Prescriptions     Medication Sig Dispense Start Date End Date Auth. Provider    HYDROcodone-acetaminophen (NORCO)  mg per tablet Take 1 tablet by mouth every 4 (four) hours as needed. 6 tablet 1/1/2022  Ratna Edge MD        Follow-up Information     Follow up With Specialties Details Why Contact Info    Jed Munoz MD Family Medicine Schedule an appointment as soon as possible for a visit  As [facility] [address] [facility] [address] [phone]             Rogerio Valdez NP  01/07/22 1212

## 2022-03-16 ENCOUNTER — HOSPITAL ENCOUNTER (EMERGENCY)
Facility: HOSPITAL | Age: 62
Discharge: HOME | End: 2022-03-16
Attending: EMERGENCY MEDICINE
Payer: COMMERCIAL

## 2022-03-16 VITALS
TEMPERATURE: 98 F | RESPIRATION RATE: 18 BRPM | SYSTOLIC BLOOD PRESSURE: 124 MMHG | DIASTOLIC BLOOD PRESSURE: 67 MMHG | OXYGEN SATURATION: 95 % | WEIGHT: 293 LBS | HEART RATE: 84 BPM | BODY MASS INDEX: 47.09 KG/M2 | HEIGHT: 66 IN

## 2022-03-16 DIAGNOSIS — E66.01 MORBID OBESITY: ICD-10-CM

## 2022-03-16 DIAGNOSIS — G89.29 CHRONIC ABDOMINAL PAIN: Primary | ICD-10-CM

## 2022-03-16 DIAGNOSIS — F11.90 CHRONIC NARCOTIC USE: ICD-10-CM

## 2022-03-16 DIAGNOSIS — R10.13 EPIGASTRIC PAIN: ICD-10-CM

## 2022-03-16 DIAGNOSIS — N30.00 ACUTE CYSTITIS WITHOUT HEMATURIA: ICD-10-CM

## 2022-03-16 DIAGNOSIS — R10.84 ABDOMINAL PAIN, GENERALIZED: ICD-10-CM

## 2022-03-16 DIAGNOSIS — R10.9 CHRONIC ABDOMINAL PAIN: Primary | ICD-10-CM

## 2022-03-16 LAB
ALBUMIN SERPL BCP-MCNC: 3.5 G/DL (ref 3.5–5.2)
ALP SERPL-CCNC: 120 U/L (ref 55–135)
ALT SERPL W/O P-5'-P-CCNC: 30 U/L (ref 10–44)
ANION GAP SERPL CALC-SCNC: 10 MMOL/L (ref 8–16)
AST SERPL-CCNC: 21 U/L (ref 10–40)
BACTERIA #/AREA URNS HPF: ABNORMAL /HPF
BASOPHILS # BLD AUTO: 0.02 K/UL (ref 0–0.2)
BASOPHILS NFR BLD: 0.2 % (ref 0–1.9)
BILIRUB SERPL-MCNC: 1.2 MG/DL (ref 0.1–1)
BILIRUB UR QL STRIP: ABNORMAL
BUN SERPL-MCNC: 12 MG/DL (ref 8–23)
CALCIUM SERPL-MCNC: 9.6 MG/DL (ref 8.7–10.5)
CHLORIDE SERPL-SCNC: 103 MMOL/L (ref 95–110)
CLARITY UR: CLEAR
CO2 SERPL-SCNC: 26 MMOL/L (ref 23–29)
COLOR UR: YELLOW
CREAT SERPL-MCNC: 1.1 MG/DL (ref 0.5–1.4)
DIFFERENTIAL METHOD: ABNORMAL
EOSINOPHIL # BLD AUTO: 0 K/UL (ref 0–0.5)
EOSINOPHIL NFR BLD: 0.2 % (ref 0–8)
ERYTHROCYTE [DISTWIDTH] IN BLOOD BY AUTOMATED COUNT: 13.8 % (ref 11.5–14.5)
EST. GFR  (AFRICAN AMERICAN): >60 ML/MIN/1.73 M^2
EST. GFR  (NON AFRICAN AMERICAN): 54 ML/MIN/1.73 M^2
GLUCOSE SERPL-MCNC: 175 MG/DL (ref 70–110)
GLUCOSE UR QL STRIP: NEGATIVE
GRAN CASTS #/AREA URNS LPF: 1 /LPF
HCT VFR BLD AUTO: 47.4 % (ref 37–48.5)
HGB BLD-MCNC: 15.1 G/DL (ref 12–16)
HGB UR QL STRIP: NEGATIVE
HYALINE CASTS #/AREA URNS LPF: 5 /LPF
IMM GRANULOCYTES # BLD AUTO: 0.05 K/UL (ref 0–0.04)
IMM GRANULOCYTES NFR BLD AUTO: 0.4 % (ref 0–0.5)
KETONES UR QL STRIP: ABNORMAL
LEUKOCYTE ESTERASE UR QL STRIP: ABNORMAL
LIPASE SERPL-CCNC: 13 U/L (ref 4–60)
LYMPHOCYTES # BLD AUTO: 0.7 K/UL (ref 1–4.8)
LYMPHOCYTES NFR BLD: 6 % (ref 18–48)
MCH RBC QN AUTO: 27 PG (ref 27–31)
MCHC RBC AUTO-ENTMCNC: 31.9 G/DL (ref 32–36)
MCV RBC AUTO: 85 FL (ref 82–98)
MICROSCOPIC COMMENT: ABNORMAL
MONOCYTES # BLD AUTO: 0.2 K/UL (ref 0.3–1)
MONOCYTES NFR BLD: 2.1 % (ref 4–15)
NEUTROPHILS # BLD AUTO: 10.5 K/UL (ref 1.8–7.7)
NEUTROPHILS NFR BLD: 91.1 % (ref 38–73)
NITRITE UR QL STRIP: NEGATIVE
NON-SQ EPI CELLS #/AREA URNS HPF: 5 /HPF
NRBC BLD-RTO: 0 /100 WBC
PH UR STRIP: 6 [PH] (ref 5–8)
PLATELET # BLD AUTO: 321 K/UL (ref 150–450)
PMV BLD AUTO: 8.8 FL (ref 9.2–12.9)
POTASSIUM SERPL-SCNC: 4.5 MMOL/L (ref 3.5–5.1)
PROT SERPL-MCNC: 7.4 G/DL (ref 6–8.4)
PROT UR QL STRIP: ABNORMAL
RBC # BLD AUTO: 5.59 M/UL (ref 4–5.4)
RBC #/AREA URNS HPF: 3 /HPF (ref 0–4)
SODIUM SERPL-SCNC: 139 MMOL/L (ref 136–145)
SP GR UR STRIP: >=1.03 (ref 1–1.03)
SQUAMOUS #/AREA URNS HPF: 5 /HPF
TROPONIN I SERPL DL<=0.01 NG/ML-MCNC: 0.01 NG/ML (ref 0–0.03)
URN SPEC COLLECT METH UR: ABNORMAL
UROBILINOGEN UR STRIP-ACNC: 1 EU/DL
WBC # BLD AUTO: 11.53 K/UL (ref 3.9–12.7)
WBC #/AREA URNS HPF: 10 /HPF (ref 0–5)
WBC CASTS #/AREA URNS LPF: 1 /LPF
YEAST URNS QL MICRO: ABNORMAL

## 2022-03-16 PROCEDURE — 99285 EMERGENCY DEPT VISIT HI MDM: CPT | Mod: 25

## 2022-03-16 PROCEDURE — 85025 COMPLETE CBC W/AUTO DIFF WBC: CPT | Performed by: EMERGENCY MEDICINE

## 2022-03-16 PROCEDURE — 96375 TX/PRO/DX INJ NEW DRUG ADDON: CPT

## 2022-03-16 PROCEDURE — 93005 ELECTROCARDIOGRAM TRACING: CPT

## 2022-03-16 PROCEDURE — 25000003 PHARM REV CODE 250: Performed by: EMERGENCY MEDICINE

## 2022-03-16 PROCEDURE — 93010 EKG 12-LEAD: ICD-10-PCS | Mod: ,,, | Performed by: INTERNAL MEDICINE

## 2022-03-16 PROCEDURE — 96372 THER/PROPH/DIAG INJ SC/IM: CPT | Performed by: EMERGENCY MEDICINE

## 2022-03-16 PROCEDURE — 80053 COMPREHEN METABOLIC PANEL: CPT | Performed by: EMERGENCY MEDICINE

## 2022-03-16 PROCEDURE — 63600175 PHARM REV CODE 636 W HCPCS: Performed by: EMERGENCY MEDICINE

## 2022-03-16 PROCEDURE — 93010 ELECTROCARDIOGRAM REPORT: CPT | Mod: ,,, | Performed by: INTERNAL MEDICINE

## 2022-03-16 PROCEDURE — 84484 ASSAY OF TROPONIN QUANT: CPT | Performed by: EMERGENCY MEDICINE

## 2022-03-16 PROCEDURE — 96374 THER/PROPH/DIAG INJ IV PUSH: CPT

## 2022-03-16 PROCEDURE — 81000 URINALYSIS NONAUTO W/SCOPE: CPT | Performed by: EMERGENCY MEDICINE

## 2022-03-16 PROCEDURE — 83690 ASSAY OF LIPASE: CPT | Performed by: EMERGENCY MEDICINE

## 2022-03-16 RX ORDER — DICYCLOMINE HYDROCHLORIDE 10 MG/ML
20 INJECTION INTRAMUSCULAR
Status: COMPLETED | OUTPATIENT
Start: 2022-03-16 | End: 2022-03-16

## 2022-03-16 RX ORDER — ONDANSETRON 2 MG/ML
4 INJECTION INTRAMUSCULAR; INTRAVENOUS
Status: COMPLETED | OUTPATIENT
Start: 2022-03-16 | End: 2022-03-16

## 2022-03-16 RX ORDER — METOCLOPRAMIDE HYDROCHLORIDE 5 MG/ML
10 INJECTION INTRAMUSCULAR; INTRAVENOUS
Status: COMPLETED | OUTPATIENT
Start: 2022-03-16 | End: 2022-03-16

## 2022-03-16 RX ORDER — MORPHINE SULFATE 4 MG/ML
4 INJECTION, SOLUTION INTRAMUSCULAR; INTRAVENOUS
Status: COMPLETED | OUTPATIENT
Start: 2022-03-16 | End: 2022-03-16

## 2022-03-16 RX ORDER — ROSUVASTATIN CALCIUM 20 MG/1
20 TABLET, COATED ORAL NIGHTLY
COMMUNITY
Start: 2021-12-09

## 2022-03-16 RX ORDER — CEFUROXIME AXETIL 500 MG/1
500 TABLET ORAL 2 TIMES DAILY
Qty: 14 TABLET | Refills: 0 | Status: SHIPPED | OUTPATIENT
Start: 2022-03-16 | End: 2022-03-23

## 2022-03-16 RX ORDER — LISINOPRIL 5 MG/1
5 TABLET ORAL DAILY
COMMUNITY
Start: 2021-12-09

## 2022-03-16 RX ORDER — FAMOTIDINE 10 MG/ML
20 INJECTION INTRAVENOUS
Status: COMPLETED | OUTPATIENT
Start: 2022-03-16 | End: 2022-03-16

## 2022-03-16 RX ADMIN — ONDANSETRON 4 MG: 2 INJECTION INTRAMUSCULAR; INTRAVENOUS at 06:03

## 2022-03-16 RX ADMIN — MORPHINE SULFATE 4 MG: 4 INJECTION INTRAVENOUS at 06:03

## 2022-03-16 RX ADMIN — DICYCLOMINE HYDROCHLORIDE 20 MG: 20 INJECTION INTRAMUSCULAR at 05:03

## 2022-03-16 RX ADMIN — METOCLOPRAMIDE 10 MG: 5 INJECTION, SOLUTION INTRAMUSCULAR; INTRAVENOUS at 05:03

## 2022-03-16 RX ADMIN — FAMOTIDINE 20 MG: 10 INJECTION, SOLUTION INTRAVENOUS at 06:03

## 2022-03-16 NOTE — ED PROVIDER NOTES
SCRIBE #1 NOTE: INunu, am scribing for, and in the presence of, Maribell Curry MD. I have scribed the HPI, ROS, and PEx.      SCRIBE #2 NOTE: IDorothy, am scribing for, and in the presence of,  Aneta Oropeza MD. I have scribed the remaining portions of the note not scribed by Scribe #1.      History     Chief Complaint   Patient presents with    Abdominal Pain     Pt CO upper abd pain w. N/V/D since 21:00 last pm     Review of patient's allergies indicates:  No Known Allergies      History of Present Illness     HPI    3/16/2022, 5:15 AM  History obtained from the patient      History of Present Illness: Roslyn Conde is a 61 y.o. female patient with a PMHx of DM type 2 and HTN who presents to the Emergency Department for evaluation of generalized abdominal pain which onset at 2100 last night. Symptoms are constant and moderate in severity. No mitigating or exacerbating factors reported. Associated sxs include n/v/d. Patient denies any fever, chills, constipation, blood in stool, dysuria, hematuria, CP, SOB, and all other sxs at this time. No prior tx reported. No further complaints or concerns at this time.       Arrival mode: Personal vehicle    PCP: Jed Munoz MD        Past Medical History:  Past Medical History:   Diagnosis Date    Chronic back pain     Diabetes mellitus, type 2     Hypertension        Past Surgical History:  Past Surgical History:   Procedure Laterality Date    ANKLE SURGERY      right    BACK SURGERY      HYSTERECTOMY      TONSILLECTOMY      TOTAL HIP ARTHROPLASTY Left     TUBAL LIGATION           Family History:  No family history on file.    Social History:  Social History     Tobacco Use    Smoking status: Never Smoker    Smokeless tobacco: Never Used   Substance and Sexual Activity    Alcohol use: No    Drug use: No    Sexual activity: Not on file        Review of Systems     Review of Systems   Constitutional: Negative for chills and fever.    HENT: Negative for sore throat.    Respiratory: Negative for shortness of breath.    Cardiovascular: Negative for chest pain.   Gastrointestinal: Positive for abdominal pain (generalized), diarrhea, nausea and vomiting. Negative for blood in stool and constipation.   Genitourinary: Negative for dysuria and hematuria.   Musculoskeletal: Negative for back pain.   Skin: Negative for rash.   Neurological: Negative for weakness.   Hematological: Does not bruise/bleed easily.   All other systems reviewed and are negative.     Physical Exam     Initial Vitals [03/16/22 0438]   BP Pulse Resp Temp SpO2   (!) 142/74 101 16 98.2 °F (36.8 °C) 96 %      MAP       --          Physical Exam  Nursing Notes and Vital Signs Reviewed.  Constitutional: Patient is in no acute distress. Well-developed and well-nourished.  Head: Atraumatic. Normocephalic.  Eyes: PERRL. EOM intact. Conjunctivae are not pale. No scleral icterus.  ENT: Mucous membranes are moist. Oropharynx is clear and symmetric.    Neck: Supple. Full ROM. No lymphadenopathy.  Cardiovascular: Regular rate. Regular rhythm. No murmurs, rubs, or gallops. Distal pulses are 2+ and symmetric.  Pulmonary/Chest: No respiratory distress. Clear to auscultation bilaterally. No wheezing or rales.  Abdominal: Soft and non-distended.  There is no tenderness.  No rebound, guarding, or rigidity. Good bowel sounds.  Genitourinary: No CVA tenderness  Musculoskeletal: Moves all extremities. No obvious deformities. No edema. No calf tenderness.  Skin: Warm and dry.  Neurological:  Alert, awake, and appropriate.  Normal speech.  No acute focal neurological deficits are appreciated.  Psychiatric: Normal affect. Good eye contact. Appropriate in content.     ED Course   Procedures  ED Vital Signs:  Vitals:    03/16/22 0438 03/16/22 0600 03/16/22 0631 03/16/22 0715   BP: (!) 142/74 (!) 153/78  132/75   Pulse: 101 84  86   Resp: 16  20 18   Temp: 98.2 °F (36.8 °C)      TempSrc: Oral      SpO2: 96%  "97%  96%   Weight: (!) 137.6 kg (303 lb 5.7 oz)      Height: 5' 6" (1.676 m)       03/16/22 0800 03/16/22 0830   BP: 139/75 124/67   Pulse: 100 84   Resp: 18 18   Temp:  98.3 °F (36.8 °C)   TempSrc:  Oral   SpO2: 95% 95%   Weight:     Height:         Abnormal Lab Results:  Labs Reviewed   URINALYSIS, REFLEX TO URINE CULTURE - Abnormal; Notable for the following components:       Result Value    Specific Gravity, UA >=1.030 (*)     Protein, UA 1+ (*)     Ketones, UA 1+ (*)     Bilirubin (UA) 2+ (*)     Leukocytes, UA Trace (*)     All other components within normal limits    Narrative:     Specimen Source->Urine   CBC W/ AUTO DIFFERENTIAL - Abnormal; Notable for the following components:    RBC 5.59 (*)     MCHC 31.9 (*)     MPV 8.8 (*)     Gran # (ANC) 10.5 (*)     Immature Grans (Abs) 0.05 (*)     Lymph # 0.7 (*)     Mono # 0.2 (*)     Gran % 91.1 (*)     Lymph % 6.0 (*)     Mono % 2.1 (*)     All other components within normal limits   COMPREHENSIVE METABOLIC PANEL - Abnormal; Notable for the following components:    Glucose 175 (*)     Total Bilirubin 1.2 (*)     eGFR if non  54 (*)     All other components within normal limits   URINALYSIS MICROSCOPIC - Abnormal; Notable for the following components:    WBC, UA 10 (*)     Bacteria Moderate (*)     Yeast, UA Rare (*)     Non-Squam Epith 5 (*)     Hyaline Casts, UA 5 (*)     WBC Casts, UA 1 (*)     Granular Casts, UA 1 (*)     All other components within normal limits    Narrative:     Specimen Source->Urine   LIPASE   TROPONIN I        All Lab Results:  Results for orders placed or performed during the hospital encounter of 03/16/22   Urinalysis, Reflex to Urine Culture Urine, Clean Catch    Specimen: Urine   Result Value Ref Range    Specimen UA Urine, Clean Catch     Color, UA Yellow Yellow, Straw, Breanna    Appearance, UA Clear Clear    pH, UA 6.0 5.0 - 8.0    Specific Gravity, UA >=1.030 (A) 1.005 - 1.030    Protein, UA 1+ (A) Negative    " Glucose, UA Negative Negative    Ketones, UA 1+ (A) Negative    Bilirubin (UA) 2+ (A) Negative    Occult Blood UA Negative Negative    Nitrite, UA Negative Negative    Urobilinogen, UA 1.0 <2.0 EU/dL    Leukocytes, UA Trace (A) Negative   CBC auto differential   Result Value Ref Range    WBC 11.53 3.90 - 12.70 K/uL    RBC 5.59 (H) 4.00 - 5.40 M/uL    Hemoglobin 15.1 12.0 - 16.0 g/dL    Hematocrit 47.4 37.0 - 48.5 %    MCV 85 82 - 98 fL    MCH 27.0 27.0 - 31.0 pg    MCHC 31.9 (L) 32.0 - 36.0 g/dL    RDW 13.8 11.5 - 14.5 %    Platelets 321 150 - 450 K/uL    MPV 8.8 (L) 9.2 - 12.9 fL    Immature Granulocytes 0.4 0.0 - 0.5 %    Gran # (ANC) 10.5 (H) 1.8 - 7.7 K/uL    Immature Grans (Abs) 0.05 (H) 0.00 - 0.04 K/uL    Lymph # 0.7 (L) 1.0 - 4.8 K/uL    Mono # 0.2 (L) 0.3 - 1.0 K/uL    Eos # 0.0 0.0 - 0.5 K/uL    Baso # 0.02 0.00 - 0.20 K/uL    nRBC 0 0 /100 WBC    Gran % 91.1 (H) 38.0 - 73.0 %    Lymph % 6.0 (L) 18.0 - 48.0 %    Mono % 2.1 (L) 4.0 - 15.0 %    Eosinophil % 0.2 0.0 - 8.0 %    Basophil % 0.2 0.0 - 1.9 %    Differential Method Automated    Comprehensive metabolic panel   Result Value Ref Range    Sodium 139 136 - 145 mmol/L    Potassium 4.5 3.5 - 5.1 mmol/L    Chloride 103 95 - 110 mmol/L    CO2 26 23 - 29 mmol/L    Glucose 175 (H) 70 - 110 mg/dL    BUN 12 8 - 23 mg/dL    Creatinine 1.1 0.5 - 1.4 mg/dL    Calcium 9.6 8.7 - 10.5 mg/dL    Total Protein 7.4 6.0 - 8.4 g/dL    Albumin 3.5 3.5 - 5.2 g/dL    Total Bilirubin 1.2 (H) 0.1 - 1.0 mg/dL    Alkaline Phosphatase 120 55 - 135 U/L    AST 21 10 - 40 U/L    ALT 30 10 - 44 U/L    Anion Gap 10 8 - 16 mmol/L    eGFR if African American >60 >60 mL/min/1.73 m^2    eGFR if non African American 54 (A) >60 mL/min/1.73 m^2   Lipase   Result Value Ref Range    Lipase 13 4 - 60 U/L   Troponin I   Result Value Ref Range    Troponin I 0.007 0.000 - 0.026 ng/mL   Urinalysis Microscopic   Result Value Ref Range    RBC, UA 3 0 - 4 /hpf    WBC, UA 10 (H) 0 - 5 /hpf    Bacteria  Moderate (A) None-Occ /hpf    Yeast, UA Rare (A) None    Squam Epithel, UA 5 /hpf    Non-Squam Epith 5 (A) <1/hpf /hpf    Hyaline Casts, UA 5 (A) 0-1/lpf /lpf    WBC Casts, UA 1 (A) None /lpf    Granular Casts, UA 1 (A) None /lpf    Microscopic Comment SEE COMMENT          Imaging Results:  Imaging Results          X-Ray Abdomen Flat And Erect (Final result)  Result time 03/16/22 07:40:03    Final result by Scottie Ya MD (03/16/22 07:40:03)                 Impression:      Nonobstructive bowel gas pattern.  Moderate constipation      Electronically signed by: Scottie Ya MD  Date:    03/16/2022  Time:    07:40             Narrative:    EXAMINATION:  XR ABDOMEN FLAT AND ERECT    CLINICAL HISTORY:  Epigastric pain    COMPARISON:  11/02/2012    FINDINGS:  Nonobstructive bowel gas pattern is noted.  No free air.  Lung bases are clear.  No radiopaque kidney calculus is identified.  Stimulator overlies the right hemipelvis.  Moderate constipation.  Postoperative changes within the lumbar spine noted as well as left hip.  No evidence of organomegaly.  The bones demonstrate moderate degenerative changes within the lower lumbar region.  The bones are otherwise intact.                                 The EKG was ordered, reviewed, and independently interpreted by the ED provider.  Interpretation time: 0512  Rate: 90 BPM  Rhythm: normal sinus rhythm  Interpretation: T wave abnormality, consider inferolateral ischemia. No STEMI.           The Emergency Provider reviewed the vital signs and test results, which are outlined above.     ED Discussion     6:00 AM: Dr. Curry transfers care of patient to Dr. Oropeza pending lab results.    6:30 AM: Re-evaluated pt. Pt is resting comfortably and is in no obvious distress. Pt is morbidly obese and has some epigastric and mid-abdominal tenderness. Pt is requesting pain medication at this time.  D/w pt all pertinent results. D/w pt any concerns expressed at this time. Answered  all questions. Pt expresses understanding at this time.      8:16 AM: Reassessed pt at this time.  Pt was advised to take lactulose and Murelax, and to f/u with her PCP and GI. Discussed with pt all pertinent ED information and results. Discussed pt dx and plan of tx. Gave pt all f/u and return to the ED instructions. All questions and concerns were addressed at this time. Pt expresses understanding of information and instructions, and is comfortable with plan to discharge. Pt is stable for discharge.    I discussed with patient and/or family/caretaker that evaluation in the ED does not suggest any emergent or life threatening medical conditions requiring immediate intervention beyond what was provided in the ED, and I believe patient is safe for discharge.  Regardless, an unremarkable evaluation in the ED does not preclude the development or presence of a serious of life threatening condition. As such, patient was instructed to return immediately for any worsening or change in current symptoms.         Medical Decision Making:   Clinical Tests:   Lab Tests: Ordered and Reviewed  Radiological Study: Ordered and Reviewed  Medical Tests: Ordered and Reviewed           ED Medication(s):  Medications   dicyclomine injection 20 mg (20 mg Intramuscular Given 3/16/22 0546)   metoclopramide HCl injection 10 mg (10 mg Intravenous Given 3/16/22 0544)   morphine injection 4 mg (4 mg Intravenous Given 3/16/22 0631)   ondansetron injection 4 mg (4 mg Intravenous Given 3/16/22 0630)   famotidine (PF) injection 20 mg (20 mg Intravenous Given 3/16/22 0633)       Discharge Medication List as of 3/16/2022  8:29 AM      START taking these medications    Details   cefUROXime (CEFTIN) 500 MG tablet Take 1 tablet (500 mg total) by mouth 2 (two) times daily. for 7 days, Starting Wed 3/16/2022, Until Wed 3/23/2022, Normal              Follow-up Information     Jed Munoz MD. Schedule an appointment as soon as possible for a visit in  2 days.    Specialty: Family Medicine  Why: Return to the Emergency Room, If symptoms worsen  Contact information:  [id number] [place] [id number] [phone]                             Scribe Attestation:   Scribe #1: I performed the above scribed service and the documentation accurately describes the services I performed. I attest to the accuracy of the note.     Attending:   Physician Attestation Statement for Scribe #1: I, Maribell Curry MD, personally performed the services described in this documentation, as scribed by Nunu Crabtree, in my presence, and it is both accurate and complete.       Scribe Attestation:   Scribe #2: I performed the above scribed service and the documentation accurately describes the services I performed. I attest to the accuracy of the note.    Attending Attestation:           Physician Attestation for Scribe:    Physician Attestation Statement for Scribe #2: I, Aneta Oropeza MD, reviewed documentation, as scribed by Dorothy Flowers in my presence, and it is both accurate and complete. I also acknowledge and confirm the content of the note done by Scribe #1.           Clinical Impression       ICD-10-CM ICD-9-CM   1. Chronic abdominal pain  R10.9 789.00    G89.29 338.29   2. Abdominal pain, generalized  R10.84 789.07   3. Epigastric pain  R10.13 789.06   4. Morbid obesity  E66.01 278.01   5. Chronic narcotic use  F11.90 305.50   6. Acute cystitis without hematuria  N30.00 595.0       Disposition:   Disposition: Discharged  Condition: Stable         Aneta Oropeza MD  03/16/22 1214

## 2022-06-27 ENCOUNTER — HOSPITAL ENCOUNTER (EMERGENCY)
Facility: HOSPITAL | Age: 62
Discharge: HOME | End: 2022-06-27
Attending: EMERGENCY MEDICINE
Payer: COMMERCIAL

## 2022-06-27 VITALS
TEMPERATURE: 98 F | WEIGHT: 293 LBS | RESPIRATION RATE: 13 BRPM | HEART RATE: 84 BPM | SYSTOLIC BLOOD PRESSURE: 143 MMHG | DIASTOLIC BLOOD PRESSURE: 62 MMHG | OXYGEN SATURATION: 93 % | BODY MASS INDEX: 48.42 KG/M2

## 2022-06-27 DIAGNOSIS — R53.83 FATIGUE: ICD-10-CM

## 2022-06-27 DIAGNOSIS — R10.9 ABDOMINAL PAIN, UNSPECIFIED ABDOMINAL LOCATION: Primary | ICD-10-CM

## 2022-06-27 LAB
ALBUMIN SERPL BCP-MCNC: 3.3 G/DL (ref 3.5–5.2)
ALP SERPL-CCNC: 86 U/L (ref 55–135)
ALT SERPL W/O P-5'-P-CCNC: 14 U/L (ref 10–44)
ANION GAP SERPL CALC-SCNC: 14 MMOL/L (ref 8–16)
AST SERPL-CCNC: 20 U/L (ref 10–40)
BACTERIA #/AREA URNS HPF: NORMAL /HPF
BASOPHILS # BLD AUTO: 0.01 K/UL (ref 0–0.2)
BASOPHILS NFR BLD: 0.1 % (ref 0–1.9)
BILIRUB SERPL-MCNC: 1 MG/DL (ref 0.1–1)
BILIRUB UR QL STRIP: NEGATIVE
BUN SERPL-MCNC: 12 MG/DL (ref 8–23)
CALCIUM SERPL-MCNC: 9.2 MG/DL (ref 8.7–10.5)
CHLORIDE SERPL-SCNC: 99 MMOL/L (ref 95–110)
CLARITY UR: ABNORMAL
CO2 SERPL-SCNC: 28 MMOL/L (ref 23–29)
COLOR UR: YELLOW
CREAT SERPL-MCNC: 1.2 MG/DL (ref 0.5–1.4)
DIFFERENTIAL METHOD: ABNORMAL
EOSINOPHIL # BLD AUTO: 0 K/UL (ref 0–0.5)
EOSINOPHIL NFR BLD: 0.4 % (ref 0–8)
ERYTHROCYTE [DISTWIDTH] IN BLOOD BY AUTOMATED COUNT: 13.9 % (ref 11.5–14.5)
EST. GFR  (AFRICAN AMERICAN): 56 ML/MIN/1.73 M^2
EST. GFR  (NON AFRICAN AMERICAN): 49 ML/MIN/1.73 M^2
GLUCOSE SERPL-MCNC: 172 MG/DL (ref 70–110)
GLUCOSE UR QL STRIP: NEGATIVE
HCT VFR BLD AUTO: 44.6 % (ref 37–48.5)
HGB BLD-MCNC: 14 G/DL (ref 12–16)
HGB UR QL STRIP: NEGATIVE
HYALINE CASTS #/AREA URNS LPF: 1 /LPF
IMM GRANULOCYTES # BLD AUTO: 0.05 K/UL (ref 0–0.04)
IMM GRANULOCYTES NFR BLD AUTO: 0.5 % (ref 0–0.5)
KETONES UR QL STRIP: ABNORMAL
LEUKOCYTE ESTERASE UR QL STRIP: NEGATIVE
LIPASE SERPL-CCNC: 18 U/L (ref 4–60)
LYMPHOCYTES # BLD AUTO: 0.6 K/UL (ref 1–4.8)
LYMPHOCYTES NFR BLD: 5.5 % (ref 18–48)
MCH RBC QN AUTO: 27.8 PG (ref 27–31)
MCHC RBC AUTO-ENTMCNC: 31.4 G/DL (ref 32–36)
MCV RBC AUTO: 89 FL (ref 82–98)
MICROSCOPIC COMMENT: NORMAL
MONOCYTES # BLD AUTO: 0.3 K/UL (ref 0.3–1)
MONOCYTES NFR BLD: 2.3 % (ref 4–15)
NEUTROPHILS # BLD AUTO: 10.1 K/UL (ref 1.8–7.7)
NEUTROPHILS NFR BLD: 91.2 % (ref 38–73)
NITRITE UR QL STRIP: NEGATIVE
NRBC BLD-RTO: 0 /100 WBC
PH UR STRIP: 6 [PH] (ref 5–8)
PLATELET # BLD AUTO: 279 K/UL (ref 150–450)
PMV BLD AUTO: 9.2 FL (ref 9.2–12.9)
POTASSIUM SERPL-SCNC: 4.2 MMOL/L (ref 3.5–5.1)
PROT SERPL-MCNC: 7.5 G/DL (ref 6–8.4)
PROT UR QL STRIP: ABNORMAL
RBC # BLD AUTO: 5.04 M/UL (ref 4–5.4)
RBC #/AREA URNS HPF: 2 /HPF (ref 0–4)
SODIUM SERPL-SCNC: 141 MMOL/L (ref 136–145)
SP GR UR STRIP: 1.03 (ref 1–1.03)
SQUAMOUS #/AREA URNS HPF: 18 /HPF
TROPONIN I SERPL DL<=0.01 NG/ML-MCNC: <0.006 NG/ML (ref 0–0.03)
URN SPEC COLLECT METH UR: ABNORMAL
UROBILINOGEN UR STRIP-ACNC: ABNORMAL EU/DL
WBC # BLD AUTO: 11.04 K/UL (ref 3.9–12.7)
WBC #/AREA URNS HPF: 2 /HPF (ref 0–5)
WBC CLUMPS URNS QL MICRO: NORMAL

## 2022-06-27 PROCEDURE — 84484 ASSAY OF TROPONIN QUANT: CPT | Performed by: NURSE PRACTITIONER

## 2022-06-27 PROCEDURE — 93010 EKG 12-LEAD: ICD-10-PCS | Mod: ,,, | Performed by: INTERNAL MEDICINE

## 2022-06-27 PROCEDURE — 83690 ASSAY OF LIPASE: CPT | Performed by: NURSE PRACTITIONER

## 2022-06-27 PROCEDURE — 96361 HYDRATE IV INFUSION ADD-ON: CPT

## 2022-06-27 PROCEDURE — 63600175 PHARM REV CODE 636 W HCPCS: Performed by: FAMILY MEDICINE

## 2022-06-27 PROCEDURE — 99284 EMERGENCY DEPT VISIT MOD MDM: CPT | Mod: 25

## 2022-06-27 PROCEDURE — 93005 ELECTROCARDIOGRAM TRACING: CPT

## 2022-06-27 PROCEDURE — 25000003 PHARM REV CODE 250: Performed by: NURSE PRACTITIONER

## 2022-06-27 PROCEDURE — 93010 ELECTROCARDIOGRAM REPORT: CPT | Mod: ,,, | Performed by: INTERNAL MEDICINE

## 2022-06-27 PROCEDURE — 25000003 PHARM REV CODE 250: Performed by: FAMILY MEDICINE

## 2022-06-27 PROCEDURE — 63600175 PHARM REV CODE 636 W HCPCS: Performed by: NURSE PRACTITIONER

## 2022-06-27 PROCEDURE — 85025 COMPLETE CBC W/AUTO DIFF WBC: CPT | Performed by: NURSE PRACTITIONER

## 2022-06-27 PROCEDURE — 81000 URINALYSIS NONAUTO W/SCOPE: CPT | Performed by: NURSE PRACTITIONER

## 2022-06-27 PROCEDURE — 80053 COMPREHEN METABOLIC PANEL: CPT | Performed by: NURSE PRACTITIONER

## 2022-06-27 PROCEDURE — 96374 THER/PROPH/DIAG INJ IV PUSH: CPT

## 2022-06-27 PROCEDURE — 96375 TX/PRO/DX INJ NEW DRUG ADDON: CPT

## 2022-06-27 RX ORDER — CETIRIZINE HYDROCHLORIDE 10 MG/1
10 TABLET ORAL DAILY
COMMUNITY

## 2022-06-27 RX ORDER — ONDANSETRON 2 MG/ML
4 INJECTION INTRAMUSCULAR; INTRAVENOUS
Status: COMPLETED | OUTPATIENT
Start: 2022-06-27 | End: 2022-06-27

## 2022-06-27 RX ORDER — ONDANSETRON 2 MG/ML
8 INJECTION INTRAMUSCULAR; INTRAVENOUS
Status: COMPLETED | OUTPATIENT
Start: 2022-06-27 | End: 2022-06-27

## 2022-06-27 RX ORDER — MAG HYDROX/ALUMINUM HYD/SIMETH 200-200-20
30 SUSPENSION, ORAL (FINAL DOSE FORM) ORAL ONCE
Status: COMPLETED | OUTPATIENT
Start: 2022-06-27 | End: 2022-06-27

## 2022-06-27 RX ORDER — LIDOCAINE HYDROCHLORIDE 20 MG/ML
10 SOLUTION OROPHARYNGEAL ONCE
Status: COMPLETED | OUTPATIENT
Start: 2022-06-27 | End: 2022-06-27

## 2022-06-27 RX ORDER — KETOROLAC TROMETHAMINE 30 MG/ML
15 INJECTION, SOLUTION INTRAMUSCULAR; INTRAVENOUS
Status: COMPLETED | OUTPATIENT
Start: 2022-06-27 | End: 2022-06-27

## 2022-06-27 RX ADMIN — ONDANSETRON 8 MG: 2 INJECTION INTRAMUSCULAR; INTRAVENOUS at 05:06

## 2022-06-27 RX ADMIN — KETOROLAC TROMETHAMINE 15 MG: 30 INJECTION, SOLUTION INTRAMUSCULAR at 05:06

## 2022-06-27 RX ADMIN — SODIUM CHLORIDE 500 ML: 0.9 INJECTION, SOLUTION INTRAVENOUS at 04:06

## 2022-06-27 RX ADMIN — ALUMINUM HYDROXIDE, MAGNESIUM HYDROXIDE, AND SIMETHICONE 30 ML: 200; 200; 20 SUSPENSION ORAL at 08:06

## 2022-06-27 RX ADMIN — ONDANSETRON 4 MG: 2 INJECTION INTRAMUSCULAR; INTRAVENOUS at 03:06

## 2022-06-27 RX ADMIN — LIDOCAINE HYDROCHLORIDE 10 ML: 20 SOLUTION ORAL; TOPICAL at 08:06

## 2022-06-27 NOTE — FIRST PROVIDER EVALUATION
Medical screening exam completed.  I have conducted a focused provider triage encounter, findings are as follows:    Brief history of present illness:  61-year-old female presents emergency department with abdominal pain, fatigue, diarrhea, and diaphoresis.    There were no vitals filed for this visit.    Pertinent physical exam:  Uncomfortable    Brief workup plan:  Labs, UA, EKG, imaging as needed.    Preliminary workup initiated; this workup will be continued and followed by the physician or advanced practice provider that is assigned to the patient when roomed.

## 2022-06-27 NOTE — ED PROVIDER NOTES
SCRIBE #1 NOTE: I, Della Arevalo, am scribing for, and in the presence of, Ratna Edge MD. I have scribed the entire note.       History     Chief Complaint   Patient presents with    Abdominal Pain     Abdominal pain began this morning with vomiting and diarrhea.  Pt also c/o weakness.  Pt missed her GI appointment last week due to her  being hospitalized.     Review of patient's allergies indicates:  No Known Allergies      History of Present Illness     HPI    6/27/2022, 4:45 PM  History obtained from the patient      History of Present Illness: Roslyn Conde is a 61 y.o. female patient with a PMHx of DM type II and HTN who presents to the Emergency Department for evaluation of generalized abdominal pain which onset several days PTA. Symptoms are constant and moderate in severity. Pt states that  has been hospitalized for a week due to salmonella. She says that they were at a seafood boil for Father's Day which led to her  being unwell. Pt has had a colonoscopy, otoscopy, and barium. She missed her GI appointment last week due to her  being hospitalized. No mitigating or exacerbating factors reported. Associated sxs include generalized weakness and n/v/d. Patient denies any fever. SOB, CP, dizziness, HA, and all other sxs at this time. No prior Tx reported. No further complaints or concerns at this time.       Arrival mode: Personal vehicle      PCP: Jed Munoz MD        Past Medical History:  Past Medical History:   Diagnosis Date    Chronic back pain     Diabetes mellitus, type 2     Hypertension        Past Surgical History:  Past Surgical History:   Procedure Laterality Date    ANKLE SURGERY      right    BACK SURGERY      HYSTERECTOMY      TONSILLECTOMY      TOTAL HIP ARTHROPLASTY Left     TUBAL LIGATION           Family History:  No family history on file.    Social History:  Social History     Tobacco Use    Smoking status: Never Smoker     Smokeless tobacco: Never Used   Substance and Sexual Activity    Alcohol use: No    Drug use: No    Sexual activity: Not on file        Review of Systems     Review of Systems   Constitutional: Negative for fever.   HENT: Negative for sore throat.    Respiratory: Negative for shortness of breath.    Cardiovascular: Negative for chest pain.   Gastrointestinal: Positive for abdominal pain, diarrhea, nausea and vomiting.   Genitourinary: Negative for dysuria.   Musculoskeletal: Negative for back pain.   Skin: Negative for rash.   Neurological: Positive for weakness. Negative for dizziness and light-headedness.   Hematological: Does not bruise/bleed easily.   All other systems reviewed and are negative.       Physical Exam     Initial Vitals [06/27/22 1359]   BP Pulse Resp Temp SpO2   (!) 152/81 83 16 97.8 °F (36.6 °C) 96 %      MAP       --          Physical Exam  Nursing Notes and Vital Signs Reviewed.  Constitutional: Patient is in acute distress. Well-developed and well-nourished.  Head: Atraumatic. Normocephalic.  Eyes: PERRL. EOM intact. Conjunctivae are not pale. No scleral icterus.  ENT: Mucous membranes are moist. Oropharynx is clear and symmetric.    Neck: Supple. Full ROM. No lymphadenopathy.  Cardiovascular: Regular rate. Regular rhythm. No murmurs, rubs, or gallops. Distal pulses are 2+ and symmetric.  Pulmonary/Chest: No respiratory distress. Clear to auscultation bilaterally. No wheezing or rales.  Abdominal: Soft and non-distended.  There is no tenderness.  No rebound, guarding, or rigidity. Good bowel sounds.  Genitourinary: No CVA tenderness  Musculoskeletal: Moves all extremities. No obvious deformities. No edema. No calf tenderness.  Skin: Warm and dry.  Neurological:  Alert, awake, and appropriate.  Normal speech.  No acute focal neurological deficits are appreciated.  Psychiatric: Normal affect. Good eye contact. Appropriate in content.     ED Course   Procedures  ED Vital Signs:  Vitals:     06/27/22 1359 06/27/22 1524 06/27/22 1528 06/27/22 1542   BP: (!) 152/81 137/80     Pulse: 83  83    Resp: 16      Temp: 97.8 °F (36.6 °C)      TempSrc: Oral      SpO2: 96%      Weight:    136.1 kg (300 lb)    06/27/22 1600 06/27/22 1630 06/27/22 1730 06/27/22 1800   BP: 134/65 (!) 173/83 (!) 177/78 (!) 154/67   Pulse: 84 86 82 87   Resp: 15 17 18 18   Temp:       TempSrc:       SpO2: 97% 99% 95% 95%   Weight:        06/27/22 1900 06/27/22 1915 06/27/22 1930 06/27/22 1945   BP: (!) 149/63  (!) 143/62    Pulse: 86 85 85 85   Resp: 13 13 (!) 9 13   Temp:       TempSrc:       SpO2: (!) 92% (!) 91% (!) 91% (!) 90%   Weight:        06/27/22 2000 06/27/22 2030 06/27/22 2100   BP: 139/61 139/64 (!) 143/62   Pulse: 84 83 84   Resp: 14 13 13   Temp:      TempSrc:      SpO2: (!) 92% (!) 91% (!) 93%   Weight:          Abnormal Lab Results:  Labs Reviewed   CBC W/ AUTO DIFFERENTIAL - Abnormal; Notable for the following components:       Result Value    MCHC 31.4 (*)     Gran # (ANC) 10.1 (*)     Immature Grans (Abs) 0.05 (*)     Lymph # 0.6 (*)     Gran % 91.2 (*)     Lymph % 5.5 (*)     Mono % 2.3 (*)     All other components within normal limits   COMPREHENSIVE METABOLIC PANEL - Abnormal; Notable for the following components:    Glucose 172 (*)     Albumin 3.3 (*)     eGFR if  56 (*)     eGFR if non  49 (*)     All other components within normal limits   URINALYSIS, REFLEX TO URINE CULTURE - Abnormal; Notable for the following components:    Appearance, UA Hazy (*)     Protein, UA 2+ (*)     Ketones, UA 1+ (*)     Urobilinogen, UA 2.0-3.0 (*)     All other components within normal limits    Narrative:     Specimen Source->Urine   LIPASE   TROPONIN I   URINALYSIS MICROSCOPIC    Narrative:     Specimen Source->Urine        All Lab Results:  Results for orders placed or performed during the hospital encounter of 06/27/22   CBC auto differential   Result Value Ref Range    WBC 11.04 3.90 - 12.70  K/uL    RBC 5.04 4.00 - 5.40 M/uL    Hemoglobin 14.0 12.0 - 16.0 g/dL    Hematocrit 44.6 37.0 - 48.5 %    MCV 89 82 - 98 fL    MCH 27.8 27.0 - 31.0 pg    MCHC 31.4 (L) 32.0 - 36.0 g/dL    RDW 13.9 11.5 - 14.5 %    Platelets 279 150 - 450 K/uL    MPV 9.2 9.2 - 12.9 fL    Immature Granulocytes 0.5 0.0 - 0.5 %    Gran # (ANC) 10.1 (H) 1.8 - 7.7 K/uL    Immature Grans (Abs) 0.05 (H) 0.00 - 0.04 K/uL    Lymph # 0.6 (L) 1.0 - 4.8 K/uL    Mono # 0.3 0.3 - 1.0 K/uL    Eos # 0.0 0.0 - 0.5 K/uL    Baso # 0.01 0.00 - 0.20 K/uL    nRBC 0 0 /100 WBC    Gran % 91.2 (H) 38.0 - 73.0 %    Lymph % 5.5 (L) 18.0 - 48.0 %    Mono % 2.3 (L) 4.0 - 15.0 %    Eosinophil % 0.4 0.0 - 8.0 %    Basophil % 0.1 0.0 - 1.9 %    Differential Method Automated    Comprehensive metabolic panel   Result Value Ref Range    Sodium 141 136 - 145 mmol/L    Potassium 4.2 3.5 - 5.1 mmol/L    Chloride 99 95 - 110 mmol/L    CO2 28 23 - 29 mmol/L    Glucose 172 (H) 70 - 110 mg/dL    BUN 12 8 - 23 mg/dL    Creatinine 1.2 0.5 - 1.4 mg/dL    Calcium 9.2 8.7 - 10.5 mg/dL    Total Protein 7.5 6.0 - 8.4 g/dL    Albumin 3.3 (L) 3.5 - 5.2 g/dL    Total Bilirubin 1.0 0.1 - 1.0 mg/dL    Alkaline Phosphatase 86 55 - 135 U/L    AST 20 10 - 40 U/L    ALT 14 10 - 44 U/L    Anion Gap 14 8 - 16 mmol/L    eGFR if African American 56 (A) >60 mL/min/1.73 m^2    eGFR if non African American 49 (A) >60 mL/min/1.73 m^2   Lipase   Result Value Ref Range    Lipase 18 4 - 60 U/L   Urinalysis, Reflex to Urine Culture Urine, Clean Catch    Specimen: Urine   Result Value Ref Range    Specimen UA Urine, Clean Catch     Color, UA Yellow Yellow, Straw, Breanna    Appearance, UA Hazy (A) Clear    pH, UA 6.0 5.0 - 8.0    Specific Gravity, UA 1.030 1.005 - 1.030    Protein, UA 2+ (A) Negative    Glucose, UA Negative Negative    Ketones, UA 1+ (A) Negative    Bilirubin (UA) Negative Negative    Occult Blood UA Negative Negative    Nitrite, UA Negative Negative    Urobilinogen, UA 2.0-3.0 (A) <2.0  EU/dL    Leukocytes, UA Negative Negative   Troponin I   Result Value Ref Range    Troponin I <0.006 0.000 - 0.026 ng/mL   Urinalysis Microscopic   Result Value Ref Range    RBC, UA 2 0 - 4 /hpf    WBC, UA 2 0 - 5 /hpf    WBC Clumps, UA Rare None-Rare    Bacteria Rare None-Occ /hpf    Squam Epithel, UA 18 /hpf    Hyaline Casts, UA 1 0-1/lpf /lpf    Microscopic Comment SEE COMMENT        Imaging Results:  Imaging Results    None          The EKG was ordered, reviewed, and independently interpreted by the ED provider.  Interpretation time: 15:16  Rate: 82 BPM  Rhythm: normal sinus rhythm  Interpretation: Nonspecific T wave abnormality. No STEMI.           The Emergency Provider reviewed the vital signs and test results, which are outlined above.     ED Discussion       Patient feels this is her regular abdominal pain and not different.  Her  is being treated for salmonella inpatient and I had a discussion about giving her antibiotics for possible salmonella but she says she is not having fever or any other symptoms outside her normal abdominal pain.  I recommended close follow up for development of any additional food poisoning type symptoms      6:17 PM: Reassessed pt at this time.  Discussed with pt all pertinent ED information and results. Discussed pt dx and plan of tx. Gave pt all f/u and return to the ED instructions. All questions and concerns were addressed at this time. Pt expresses understanding of information and instructions, and is comfortable with plan to discharge. Pt is stable for discharge.    I discussed with patient and/or family/caretaker that evaluation in the ED does not suggest any emergent or life threatening medical conditions requiring immediate intervention beyond what was provided in the ED, and I believe patient is safe for discharge.  Regardless, an unremarkable evaluation in the ED does not preclude the development or presence of a serious of life threatening condition. As such,  patient was instructed to return immediately for any worsening or change in current symptoms.  Medical Decision Making:   Clinical Tests:   Lab Tests: Ordered and Reviewed  Medical Tests: Ordered and Reviewed           ED Medication(s):  Medications   ondansetron injection 4 mg (4 mg Intravenous Given 6/27/22 1548)   sodium chloride 0.9% bolus 500 mL (0 mLs Intravenous Stopped 6/27/22 1723)   aluminum-magnesium hydroxide-simethicone 200-200-20 mg/5 mL suspension 30 mL (30 mLs Oral Given 6/27/22 2035)     And   LIDOcaine HCl 2% oral solution 10 mL (10 mLs Oral Given 6/27/22 2036)   ketorolac injection 15 mg (15 mg Intravenous Given 6/27/22 1724)   ondansetron injection 8 mg (8 mg Intravenous Given 6/27/22 1724)       Discharge Medication List as of 6/27/2022  9:29 PM           Follow-up Information     Schedule an appointment as soon as possible for a visit  with Jed Munoz MD.    Specialty: Family Medicine  Why: As needed  Contact information:  [address] [facility] [address] [phone]                             Scribe Attestation:   Scribe #1: I performed the above scribed service and the documentation accurately describes the services I performed. I attest to the accuracy of the note.     Attending:   Physician Attestation Statement for Scribe #1: I, Ratna Edge MD, personally performed the services described in this documentation, as scribed by Della Arevalo, in my presence, and it is both accurate and complete.           Clinical Impression       ICD-10-CM ICD-9-CM   1. Abdominal pain, unspecified abdominal location  R10.9 789.00   2. Fatigue  R53.83 780.79       Disposition:   Disposition: Discharged  Condition: Stable       Ratna Edge MD  06/29/22 6319

## 2022-07-20 ENCOUNTER — HOSPITAL ENCOUNTER (EMERGENCY)
Facility: HOSPITAL | Age: 62
Discharge: HOME | End: 2022-07-20
Attending: EMERGENCY MEDICINE
Payer: COMMERCIAL

## 2022-07-20 VITALS
HEART RATE: 98 BPM | BODY MASS INDEX: 45.88 KG/M2 | RESPIRATION RATE: 20 BRPM | OXYGEN SATURATION: 98 % | SYSTOLIC BLOOD PRESSURE: 158 MMHG | TEMPERATURE: 99 F | WEIGHT: 285.5 LBS | DIASTOLIC BLOOD PRESSURE: 72 MMHG | HEIGHT: 66 IN

## 2022-07-20 DIAGNOSIS — R10.84 GENERALIZED ABDOMINAL PAIN: Primary | ICD-10-CM

## 2022-07-20 LAB
ALBUMIN SERPL BCP-MCNC: 3.6 G/DL (ref 3.5–5.2)
ALP SERPL-CCNC: 95 U/L (ref 55–135)
ALT SERPL W/O P-5'-P-CCNC: 9 U/L (ref 10–44)
ANION GAP SERPL CALC-SCNC: 13 MMOL/L (ref 8–16)
AST SERPL-CCNC: 16 U/L (ref 10–40)
BASOPHILS # BLD AUTO: 0.01 K/UL (ref 0–0.2)
BASOPHILS NFR BLD: 0.1 % (ref 0–1.9)
BILIRUB SERPL-MCNC: 1.7 MG/DL (ref 0.1–1)
BILIRUB UR QL STRIP: NEGATIVE
BUN SERPL-MCNC: 13 MG/DL (ref 8–23)
CALCIUM SERPL-MCNC: 9.3 MG/DL (ref 8.7–10.5)
CHLORIDE SERPL-SCNC: 100 MMOL/L (ref 95–110)
CLARITY UR: CLEAR
CO2 SERPL-SCNC: 28 MMOL/L (ref 23–29)
COLOR UR: YELLOW
CREAT SERPL-MCNC: 1.1 MG/DL (ref 0.5–1.4)
DIFFERENTIAL METHOD: ABNORMAL
EOSINOPHIL # BLD AUTO: 0.4 K/UL (ref 0–0.5)
EOSINOPHIL NFR BLD: 5.2 % (ref 0–8)
ERYTHROCYTE [DISTWIDTH] IN BLOOD BY AUTOMATED COUNT: 13.9 % (ref 11.5–14.5)
EST. GFR  (AFRICAN AMERICAN): >60 ML/MIN/1.73 M^2
EST. GFR  (NON AFRICAN AMERICAN): 54 ML/MIN/1.73 M^2
GLUCOSE SERPL-MCNC: 124 MG/DL (ref 70–110)
GLUCOSE UR QL STRIP: NEGATIVE
HCT VFR BLD AUTO: 39.7 % (ref 37–48.5)
HGB BLD-MCNC: 12.6 G/DL (ref 12–16)
HGB UR QL STRIP: NEGATIVE
IMM GRANULOCYTES # BLD AUTO: 0.02 K/UL (ref 0–0.04)
IMM GRANULOCYTES NFR BLD AUTO: 0.3 % (ref 0–0.5)
KETONES UR QL STRIP: NEGATIVE
LEUKOCYTE ESTERASE UR QL STRIP: NEGATIVE
LIPASE SERPL-CCNC: 13 U/L (ref 4–60)
LYMPHOCYTES # BLD AUTO: 1.5 K/UL (ref 1–4.8)
LYMPHOCYTES NFR BLD: 21.2 % (ref 18–48)
MCH RBC QN AUTO: 28.1 PG (ref 27–31)
MCHC RBC AUTO-ENTMCNC: 31.7 G/DL (ref 32–36)
MCV RBC AUTO: 89 FL (ref 82–98)
MONOCYTES # BLD AUTO: 0.5 K/UL (ref 0.3–1)
MONOCYTES NFR BLD: 6.3 % (ref 4–15)
NEUTROPHILS # BLD AUTO: 4.8 K/UL (ref 1.8–7.7)
NEUTROPHILS NFR BLD: 66.9 % (ref 38–73)
NITRITE UR QL STRIP: NEGATIVE
NRBC BLD-RTO: 0 /100 WBC
PH UR STRIP: 6 [PH] (ref 5–8)
PLATELET # BLD AUTO: 223 K/UL (ref 150–450)
PMV BLD AUTO: 8.7 FL (ref 9.2–12.9)
POTASSIUM SERPL-SCNC: 3.5 MMOL/L (ref 3.5–5.1)
PROT SERPL-MCNC: 7.4 G/DL (ref 6–8.4)
PROT UR QL STRIP: NEGATIVE
RBC # BLD AUTO: 4.48 M/UL (ref 4–5.4)
SODIUM SERPL-SCNC: 141 MMOL/L (ref 136–145)
SP GR UR STRIP: 1.01 (ref 1–1.03)
URN SPEC COLLECT METH UR: NORMAL
UROBILINOGEN UR STRIP-ACNC: NEGATIVE EU/DL
WBC # BLD AUTO: 7.12 K/UL (ref 3.9–12.7)

## 2022-07-20 PROCEDURE — 81003 URINALYSIS AUTO W/O SCOPE: CPT | Performed by: EMERGENCY MEDICINE

## 2022-07-20 PROCEDURE — 96361 HYDRATE IV INFUSION ADD-ON: CPT

## 2022-07-20 PROCEDURE — 25000003 PHARM REV CODE 250: Performed by: EMERGENCY MEDICINE

## 2022-07-20 PROCEDURE — 83690 ASSAY OF LIPASE: CPT | Performed by: EMERGENCY MEDICINE

## 2022-07-20 PROCEDURE — 96375 TX/PRO/DX INJ NEW DRUG ADDON: CPT

## 2022-07-20 PROCEDURE — 80053 COMPREHEN METABOLIC PANEL: CPT | Performed by: EMERGENCY MEDICINE

## 2022-07-20 PROCEDURE — 99284 EMERGENCY DEPT VISIT MOD MDM: CPT | Mod: 25

## 2022-07-20 PROCEDURE — 96374 THER/PROPH/DIAG INJ IV PUSH: CPT

## 2022-07-20 PROCEDURE — 85025 COMPLETE CBC W/AUTO DIFF WBC: CPT | Performed by: EMERGENCY MEDICINE

## 2022-07-20 PROCEDURE — 63600175 PHARM REV CODE 636 W HCPCS: Performed by: EMERGENCY MEDICINE

## 2022-07-20 RX ORDER — HYDROCODONE BITARTRATE AND ACETAMINOPHEN 7.5; 325 MG/1; MG/1
1 TABLET ORAL EVERY 6 HOURS PRN
Qty: 11 TABLET | Refills: 0 | Status: SHIPPED | OUTPATIENT
Start: 2022-07-20 | End: 2022-07-20 | Stop reason: CLARIF

## 2022-07-20 RX ORDER — ONDANSETRON 2 MG/ML
4 INJECTION INTRAMUSCULAR; INTRAVENOUS
Status: COMPLETED | OUTPATIENT
Start: 2022-07-20 | End: 2022-07-20

## 2022-07-20 RX ORDER — FLUTICASONE PROPIONATE 50 MCG
SPRAY, SUSPENSION (ML) NASAL
COMMUNITY

## 2022-07-20 RX ORDER — OXYCODONE AND ACETAMINOPHEN 10; 325 MG/1; MG/1
1 TABLET ORAL
COMMUNITY

## 2022-07-20 RX ORDER — PANTOPRAZOLE SODIUM 40 MG/1
40 TABLET, DELAYED RELEASE ORAL DAILY
COMMUNITY
Start: 2022-07-18

## 2022-07-20 RX ORDER — HYOSCYAMINE SULFATE 0.125 MG
125 TABLET ORAL 4 TIMES DAILY
COMMUNITY
Start: 2022-06-10

## 2022-07-20 RX ORDER — HYDROMORPHONE HYDROCHLORIDE 1 MG/ML
1 INJECTION, SOLUTION INTRAMUSCULAR; INTRAVENOUS; SUBCUTANEOUS
Status: COMPLETED | OUTPATIENT
Start: 2022-07-20 | End: 2022-07-20

## 2022-07-20 RX ADMIN — SODIUM CHLORIDE 1000 ML: 0.9 INJECTION, SOLUTION INTRAVENOUS at 01:07

## 2022-07-20 RX ADMIN — ONDANSETRON 4 MG: 2 INJECTION INTRAMUSCULAR; INTRAVENOUS at 01:07

## 2022-07-20 RX ADMIN — HYDROMORPHONE HYDROCHLORIDE 1 MG: 1 INJECTION, SOLUTION INTRAMUSCULAR; INTRAVENOUS; SUBCUTANEOUS at 01:07

## 2022-07-20 NOTE — ED PROVIDER NOTES
SCRIBE #1 NOTE: I, Todd Tamayo, am scribing for, and in the presence of, Alfa Rodriguez Jr., MD. I have scribed the HPI, ROS, and PEx.     SCRIBE #2 NOTE: I, Chetan Guerrero, am scribing for, and in the presence of,  Maribell Curry MD. I have scribed the following portions of the note - Other sections scribed: ED discussion.     SRIBE #3 NOTE: I, Pro Terrell, am scribing for, and in the presence of, Bayron Branham MD.   I have scribed the remaining portions of the note not scribed by Scribe 1 and 2.   History     Chief Complaint   Patient presents with    Abdominal Pain     Pt CO severe abd pain x several weeks. Missed GI appt earlier. Scheduled for lower gi endo Thursday. Pt reports dysuria, N/V. Denies any bloody BM or urine.     Review of patient's allergies indicates:  No Known Allergies      History of Present Illness     HPI    7/20/2022, 12:48 AM  History obtained from the patient      History of Present Illness: Roslyn Conde is a 62 y.o. female patient with a PMHx of chronic back pain, DM2, HTN, hiatal hernia who presents to the Emergency Department for evaluation of abdominal pain which has been an ongoing issue, but reports the pain got much worse yesterday. Pt is reportedly a pain management patient. Symptoms are constant and moderate in severity. No mitigating or exacerbating factors reported. Associated sxs include N/V. Patient denies any dysuria, chills, fever, SOB, CP, weakness, diarrhea, sore throat, dizziness, and all other sxs at this time. Pt saw a doctor last Thursday and her Linzess Rx was increased in dosage. No further complaints or concerns at this time.       Arrival mode: Personal vehicle    PCP: Jed Munoz MD        Past Medical History:  Past Medical History:   Diagnosis Date    Chronic back pain     Diabetes mellitus, type 2     Hypertension        Past Surgical History:  Past Surgical History:   Procedure Laterality Date    ANKLE SURGERY      right    BACK SURGERY       HYSTERECTOMY      TONSILLECTOMY      TOTAL HIP ARTHROPLASTY Left     TUBAL LIGATION           Family History:  No family history on file.    Social History:  Social History     Tobacco Use    Smoking status: Never Smoker    Smokeless tobacco: Never Used   Substance and Sexual Activity    Alcohol use: No    Drug use: No    Sexual activity: Not on file        Review of Systems     Review of Systems   Constitutional: Negative for chills and fever.   HENT: Negative for sore throat.    Respiratory: Negative for shortness of breath.    Cardiovascular: Negative for chest pain.   Gastrointestinal: Positive for abdominal pain, nausea and vomiting. Negative for diarrhea.   Genitourinary: Negative for dysuria.   Musculoskeletal: Negative for back pain.   Skin: Negative for rash.   Neurological: Negative for dizziness and weakness.   Hematological: Does not bruise/bleed easily.   All other systems reviewed and are negative.     Physical Exam     Initial Vitals [07/20/22 0034]   BP Pulse Resp Temp SpO2   (!) 160/68 106 16 98.6 °F (37 °C) 95 %      MAP       --          Physical Exam  Nursing Notes and Vital Signs Reviewed.  Constitutional: Patient is in no acute distress. Well-developed and well-nourished.  Head: Atraumatic. Normocephalic.  Eyes:  EOM intact.  No scleral icterus.  ENT: Mucous membranes are moist.  Nares clear   Neck:  Full ROM. No JVD.  Cardiovascular: Regular rate. Regular rhythm No murmurs, rubs, or gallops. Distal pulses are 2+ and symmetric  Pulmonary/Chest: No respiratory distress. Clear to auscultation bilaterally. No wheezing or rales.  Equal chest wall rise bilaterally  Abdominal: Soft and non-distended.  There is no tenderness.  No rebound, guarding, or rigidity. Good bowel sounds.  Genitourinary: No CVA tenderness.  No suprapubic tenderness  Musculoskeletal: Moves all extremities. No obvious deformities.  5 x 5 strength in all extremities   Skin: Warm and dry.  Neurological:  Alert, awake,  "and appropriate.  Normal speech.  No acute focal neurological deficits are appreciated.  Two through 12 intact bilaterally.  Psychiatric: Normal affect. Good eye contact. Appropriate in content.     ED Course   Procedures  ED Vital Signs:  Vitals:    07/20/22 0034 07/20/22 0102 07/20/22 0126   BP: (!) 160/68     Pulse: 106 100    Resp: 16  18   Temp: 98.6 °F (37 °C)     TempSrc: Oral     SpO2: 95%     Weight: 129.5 kg (285 lb 7.9 oz)     Height: 5' 6" (1.676 m)         Abnormal Lab Results:  Labs Reviewed   COMPREHENSIVE METABOLIC PANEL - Abnormal; Notable for the following components:       Result Value    Glucose 124 (*)     Total Bilirubin 1.7 (*)     ALT 9 (*)     eGFR if non  54 (*)     All other components within normal limits   CBC W/ AUTO DIFFERENTIAL - Abnormal; Notable for the following components:    MCHC 31.7 (*)     MPV 8.7 (*)     All other components within normal limits   LIPASE   URINALYSIS, REFLEX TO URINE CULTURE    Narrative:     Specimen Source->Urine        All Lab Results:  Results for orders placed or performed during the hospital encounter of 07/20/22   Comprehensive metabolic panel   Result Value Ref Range    Sodium 141 136 - 145 mmol/L    Potassium 3.5 3.5 - 5.1 mmol/L    Chloride 100 95 - 110 mmol/L    CO2 28 23 - 29 mmol/L    Glucose 124 (H) 70 - 110 mg/dL    BUN 13 8 - 23 mg/dL    Creatinine 1.1 0.5 - 1.4 mg/dL    Calcium 9.3 8.7 - 10.5 mg/dL    Total Protein 7.4 6.0 - 8.4 g/dL    Albumin 3.6 3.5 - 5.2 g/dL    Total Bilirubin 1.7 (H) 0.1 - 1.0 mg/dL    Alkaline Phosphatase 95 55 - 135 U/L    AST 16 10 - 40 U/L    ALT 9 (L) 10 - 44 U/L    Anion Gap 13 8 - 16 mmol/L    eGFR if African American >60 >60 mL/min/1.73 m^2    eGFR if non African American 54 (A) >60 mL/min/1.73 m^2   CBC auto differential   Result Value Ref Range    WBC 7.12 3.90 - 12.70 K/uL    RBC 4.48 4.00 - 5.40 M/uL    Hemoglobin 12.6 12.0 - 16.0 g/dL    Hematocrit 39.7 37.0 - 48.5 %    MCV 89 82 - 98 fL    " MCH 28.1 27.0 - 31.0 pg    MCHC 31.7 (L) 32.0 - 36.0 g/dL    RDW 13.9 11.5 - 14.5 %    Platelets 223 150 - 450 K/uL    MPV 8.7 (L) 9.2 - 12.9 fL    Immature Granulocytes 0.3 0.0 - 0.5 %    Gran # (ANC) 4.8 1.8 - 7.7 K/uL    Immature Grans (Abs) 0.02 0.00 - 0.04 K/uL    Lymph # 1.5 1.0 - 4.8 K/uL    Mono # 0.5 0.3 - 1.0 K/uL    Eos # 0.4 0.0 - 0.5 K/uL    Baso # 0.01 0.00 - 0.20 K/uL    nRBC 0 0 /100 WBC    Gran % 66.9 38.0 - 73.0 %    Lymph % 21.2 18.0 - 48.0 %    Mono % 6.3 4.0 - 15.0 %    Eosinophil % 5.2 0.0 - 8.0 %    Basophil % 0.1 0.0 - 1.9 %    Differential Method Automated    Lipase   Result Value Ref Range    Lipase 13 4 - 60 U/L   Urinalysis, Reflex to Urine Culture Urine, Clean Catch    Specimen: Urine   Result Value Ref Range    Specimen UA Urine, Clean Catch     Color, UA Yellow Yellow, Straw, Breanna    Appearance, UA Clear Clear    pH, UA 6.0 5.0 - 8.0    Specific Gravity, UA 1.010 1.005 - 1.030    Protein, UA Negative Negative    Glucose, UA Negative Negative    Ketones, UA Negative Negative    Bilirubin (UA) Negative Negative    Occult Blood UA Negative Negative    Nitrite, UA Negative Negative    Urobilinogen, UA Negative <2.0 EU/dL    Leukocytes, UA Negative Negative       Imaging Results:  Imaging Results          CT Renal Stone Study ABD Pelvis WO (In process)                6:49 AM: Per STAT radiology, pt's CT results:    1. Small to moderate ascites, mainly in pelvis.  2. Suspect mural thickening of a long segment of small bowel at extending from upper abdomen to pelvis, suggesting enteritis, which may be infectious, inflammatory, or ischemic in etiology.  3. Evaluation limited by lack of oral and intravenous contrast.  4. Cholelithiasis. No CT evidence of acute cholecystitis.  5. Probably hepatic stenosis and there is mild hepatomegaly.  6. Gas filled appendix is mildly dilated proximally, but its wall is thin and there are no adjacent inflammatory changes to suggest acute appendicitis.  7.  Post-hysterectomy.  8. 1.6 cm right lower lobe exophytic lesion is denser than simple fluid and incompletely characterized.         The Emergency Provider reviewed the vital signs and test results, which are outlined above.     ED Discussion     1:29 AM: Dr. Rodriguez transfers care of patient to Dr. Curry pending lab results.    6:00 AM: Dr. Curry transfers care of patient to Dr. Branham pending imaging results.    6:52 AM: Reassessed pt at this time. Discussed with pt all pertinent ED information and results. Discussed pt dx and plan of tx. Gave pt all f/u and return to the ED instructions. All questions and concerns were addressed at this time. Pt expresses understanding of information and instructions, and is comfortable with plan to discharge. Pt is stable for discharge.    I discussed with patient and/or family/caretaker that evaluation in the ED does not suggest any emergent or life threatening medical conditions requiring immediate intervention beyond what was provided in the ED, and I believe patient is safe for discharge.  Regardless, an unremarkable evaluation in the ED does not preclude the development or presence of a serious of life threatening condition. As such, patient was instructed to return immediately for any worsening or change in current symptoms.     Medical Decision Making:   Clinical Tests:   Lab Tests: Ordered and Reviewed  Radiological Study: Ordered and Reviewed           ED Medication(s):  Medications   sodium chloride 0.9% bolus 1,000 mL (0 mLs Intravenous Stopped 7/20/22 0300)   ondansetron injection 4 mg (4 mg Intravenous Given 7/20/22 0124)   HYDROmorphone injection 1 mg (1 mg Intravenous Given 7/20/22 0126)       New Prescriptions    HYDROCODONE-ACETAMINOPHEN (NORCO) 7.5-325 MG PER TABLET    Take 1 tablet by mouth every 6 (six) hours as needed for Pain.        Follow-up Information     Jed Munoz MD.    Specialty: Family Medicine  Contact information:  [address]  [place]  [place] [address]  [phone]                             Scribe Attestation:   Scribe #1: I performed the above scribed service and the documentation accurately describes the services I performed. I attest to the accuracy of the note.     Attending:   Physician Attestation Statement for Scribe #1: I, Alfa Rodriguez Jr., MD, personally performed the services described in this documentation, as scribed by Todd Tamayo, in my presence, and it is both accurate and complete.       Scribe Attestation:   Scribe #2: I performed the above scribed service and the documentation accurately describes the services I performed. I attest to the accuracy of the note.    Attending Attestation:           Physician Attestation for Scribe:    Physician Attestation Statement for Scribe #2: I, Maribell Curry MD, reviewed documentation, as scribed by Chetan Guerrero in my presence, and it is both accurate and complete. I also acknowledge and confirm the content of the note done by Scribe #1.        Scribe Attestation:   Scribe #3: I performed the above scribed service and the documentation accurately describes the services I performed. I attest to the accuracy of the note.     Attending:   Physician Attestation Statement for Scribe #3: I, Bayron Branham MD personally performed the services described in this documentation, as scribed by Pro Terrell, in my presence, and it is both accurate and complete.      Clinical Impression       ICD-10-CM ICD-9-CM   1. Generalized abdominal pain  R10.84 789.07       Disposition:   Disposition: Discharged  Condition: Stable       Bayron Branham MD  07/20/22 0701

## 2022-11-11 ENCOUNTER — HOSPITAL ENCOUNTER (EMERGENCY)
Facility: HOSPITAL | Age: 62
Discharge: HOME | End: 2022-11-11
Attending: EMERGENCY MEDICINE
Payer: COMMERCIAL

## 2022-11-11 VITALS
BODY MASS INDEX: 45.81 KG/M2 | RESPIRATION RATE: 16 BRPM | DIASTOLIC BLOOD PRESSURE: 71 MMHG | HEIGHT: 66 IN | HEART RATE: 85 BPM | TEMPERATURE: 98 F | WEIGHT: 285.06 LBS | OXYGEN SATURATION: 97 % | SYSTOLIC BLOOD PRESSURE: 151 MMHG

## 2022-11-11 DIAGNOSIS — R10.84 CHRONIC GENERALIZED ABDOMINAL PAIN: Primary | ICD-10-CM

## 2022-11-11 DIAGNOSIS — E66.01 MORBID OBESITY: ICD-10-CM

## 2022-11-11 DIAGNOSIS — G89.29 CHRONIC BACK PAIN GREATER THAN 3 MONTHS DURATION: ICD-10-CM

## 2022-11-11 DIAGNOSIS — G89.29 CHRONIC GENERALIZED ABDOMINAL PAIN: Primary | ICD-10-CM

## 2022-11-11 DIAGNOSIS — F11.90 CHRONIC NARCOTIC USE: ICD-10-CM

## 2022-11-11 DIAGNOSIS — M54.9 CHRONIC BACK PAIN GREATER THAN 3 MONTHS DURATION: ICD-10-CM

## 2022-11-11 LAB
ALBUMIN SERPL BCP-MCNC: 3.4 G/DL (ref 3.5–5.2)
ALP SERPL-CCNC: 96 U/L (ref 55–135)
ALT SERPL W/O P-5'-P-CCNC: 8 U/L (ref 10–44)
ANION GAP SERPL CALC-SCNC: 13 MMOL/L (ref 8–16)
AST SERPL-CCNC: 17 U/L (ref 10–40)
BASOPHILS # BLD AUTO: 0.02 K/UL (ref 0–0.2)
BASOPHILS NFR BLD: 0.2 % (ref 0–1.9)
BILIRUB SERPL-MCNC: 1.3 MG/DL (ref 0.1–1)
BUN SERPL-MCNC: 10 MG/DL (ref 8–23)
CALCIUM SERPL-MCNC: 9.6 MG/DL (ref 8.7–10.5)
CHLORIDE SERPL-SCNC: 99 MMOL/L (ref 95–110)
CO2 SERPL-SCNC: 28 MMOL/L (ref 23–29)
CREAT SERPL-MCNC: 0.9 MG/DL (ref 0.5–1.4)
DIFFERENTIAL METHOD: ABNORMAL
EOSINOPHIL # BLD AUTO: 0.1 K/UL (ref 0–0.5)
EOSINOPHIL NFR BLD: 0.8 % (ref 0–8)
ERYTHROCYTE [DISTWIDTH] IN BLOOD BY AUTOMATED COUNT: 14.3 % (ref 11.5–14.5)
EST. GFR  (NO RACE VARIABLE): >60 ML/MIN/1.73 M^2
GLUCOSE SERPL-MCNC: 143 MG/DL (ref 70–110)
HCT VFR BLD AUTO: 48.2 % (ref 37–48.5)
HGB BLD-MCNC: 15.4 G/DL (ref 12–16)
IMM GRANULOCYTES # BLD AUTO: 0.04 K/UL (ref 0–0.04)
IMM GRANULOCYTES NFR BLD AUTO: 0.4 % (ref 0–0.5)
LIPASE SERPL-CCNC: 14 U/L (ref 4–60)
LYMPHOCYTES # BLD AUTO: 0.8 K/UL (ref 1–4.8)
LYMPHOCYTES NFR BLD: 7.6 % (ref 18–48)
MCH RBC QN AUTO: 26.9 PG (ref 27–31)
MCHC RBC AUTO-ENTMCNC: 32 G/DL (ref 32–36)
MCV RBC AUTO: 84 FL (ref 82–98)
MONOCYTES # BLD AUTO: 0.4 K/UL (ref 0.3–1)
MONOCYTES NFR BLD: 3.8 % (ref 4–15)
NEUTROPHILS # BLD AUTO: 9.2 K/UL (ref 1.8–7.7)
NEUTROPHILS NFR BLD: 87.2 % (ref 38–73)
NRBC BLD-RTO: 0 /100 WBC
PLATELET # BLD AUTO: 296 K/UL (ref 150–450)
PMV BLD AUTO: 8.7 FL (ref 9.2–12.9)
POTASSIUM SERPL-SCNC: 3.3 MMOL/L (ref 3.5–5.1)
PROT SERPL-MCNC: 8.1 G/DL (ref 6–8.4)
RBC # BLD AUTO: 5.72 M/UL (ref 4–5.4)
SODIUM SERPL-SCNC: 140 MMOL/L (ref 136–145)
WBC # BLD AUTO: 10.59 K/UL (ref 3.9–12.7)

## 2022-11-11 PROCEDURE — 99284 EMERGENCY DEPT VISIT MOD MDM: CPT

## 2022-11-11 PROCEDURE — 80053 COMPREHEN METABOLIC PANEL: CPT | Performed by: EMERGENCY MEDICINE

## 2022-11-11 PROCEDURE — 85025 COMPLETE CBC W/AUTO DIFF WBC: CPT | Performed by: EMERGENCY MEDICINE

## 2022-11-11 PROCEDURE — 96372 THER/PROPH/DIAG INJ SC/IM: CPT | Performed by: EMERGENCY MEDICINE

## 2022-11-11 PROCEDURE — 63600175 PHARM REV CODE 636 W HCPCS: Performed by: EMERGENCY MEDICINE

## 2022-11-11 PROCEDURE — 83690 ASSAY OF LIPASE: CPT | Performed by: EMERGENCY MEDICINE

## 2022-11-11 RX ORDER — DICYCLOMINE HYDROCHLORIDE 10 MG/ML
20 INJECTION INTRAMUSCULAR
Status: COMPLETED | OUTPATIENT
Start: 2022-11-11 | End: 2022-11-11

## 2022-11-11 RX ORDER — METOCLOPRAMIDE 10 MG/1
10 TABLET ORAL EVERY 6 HOURS
Qty: 30 TABLET | Refills: 0 | Status: SHIPPED | OUTPATIENT
Start: 2022-11-11

## 2022-11-11 RX ADMIN — DICYCLOMINE HYDROCHLORIDE 20 MG: 20 INJECTION INTRAMUSCULAR at 07:11

## 2022-11-12 NOTE — ED PROVIDER NOTES
SCRIBE #1 NOTE: I, Nina Hunter, am scribing for, and in the presence of, Aneta Oropeza MD. I have scribed the entire note.       History     Chief Complaint   Patient presents with    Abdominal Pain     Abd pain described as sharp, starting this morning. Pain rated 10/10. Some vomiting and diarrhea.     Review of patient's allergies indicates:  No Known Allergies      History of Present Illness     HPI    11/11/2022, 7:13 PM  History obtained from the patient      History of Present Illness: Roslyn Conde is a 62 y.o. female patient with a PMHx of Chronic back pain, Diabetes Type II Mellitus, HTN who presents to the Emergency Department for evaluation of abdominal pain which onset earlier today around 11 AM. Pt states she was constipated but she took some medicine last night and now her bowels have become loose. Pt admits she feels as if she has a dry mouth. Symptoms are constant and moderate in severity. No mitigating or exacerbating factors reported. Patient denies any fever, SOB, headaches, dizziness, leg swelling, chills, and all other sxs at this time. No further complaints or concerns at this time.       Arrival mode: EMS    PCP: Jed Munoz MD        Past Medical History:  Past Medical History:   Diagnosis Date    Chronic back pain     Diabetes mellitus, type 2     Hypertension        Past Surgical History:  Past Surgical History:   Procedure Laterality Date    ANKLE SURGERY      right    BACK SURGERY      HYSTERECTOMY      TONSILLECTOMY      TOTAL HIP ARTHROPLASTY Left     TUBAL LIGATION           Family History:  No family history on file.    Social History:  Social History     Tobacco Use    Smoking status: Never    Smokeless tobacco: Never   Substance and Sexual Activity    Alcohol use: No    Drug use: No    Sexual activity: Not on file        Review of Systems     Review of Systems   Constitutional:  Negative for chills and fever.   HENT:  Negative for sore throat.    Respiratory:  Negative  "for shortness of breath.    Cardiovascular:  Negative for chest pain.   Gastrointestinal:  Positive for abdominal pain. Negative for constipation, nausea and vomiting.   Genitourinary:  Negative for dysuria.   Musculoskeletal:  Negative for back pain and joint swelling.   Skin:  Negative for rash.   Neurological:  Negative for weakness.   Hematological:  Does not bruise/bleed easily.   All other systems reviewed and are negative.     Physical Exam     Initial Vitals [11/11/22 1639]   BP Pulse Resp Temp SpO2   (!) 151/71 85 16 98.4 °F (36.9 °C) 97 %      MAP       --          Physical Exam  Nursing Notes and Vital Signs Reviewed.  Constitutional: Patient is in no apparent distress. Morbidly Obese.   Head: Atraumatic. Normocephalic.  Eyes: PERRL. EOM intact. Conjunctivae are not pale. No scleral icterus.  ENT: Mucous membranes are moist. Oropharynx is clear and symmetric.    Neck: Supple. Full ROM.   Cardiovascular: Regular rate. Regular rhythm. No murmurs, rubs, or gallops. Distal pulses are 2+ and symmetric.  Pulmonary/Chest: No respiratory distress. Clear to auscultation bilaterally. No wheezing or rales.  Abdominal: Soft and non-distended.  There is no tenderness.  No rebound, guarding, or rigidity. Good bowel sounds.  Genitourinary: No CVA tenderness  Musculoskeletal: Moves all extremities. No obvious deformities. No edema.  Skin: Warm and dry.  Neurological:  Alert, awake, and appropriate.  Normal speech.  No acute focal neurological deficits are appreciated.  Psychiatric: Normal affect. Good eye contact. Appropriate in content.     ED Course   Procedures  ED Vital Signs:  Vitals:    11/11/22 1639   BP: (!) 151/71   Pulse: 85   Resp: 16   Temp: 98.4 °F (36.9 °C)   SpO2: 97%   Weight: 129.3 kg (285 lb 0.9 oz)   Height: 5' 6" (1.676 m)       Abnormal Lab Results:  Labs Reviewed   CBC W/ AUTO DIFFERENTIAL - Abnormal; Notable for the following components:       Result Value    RBC 5.72 (*)     MCH 26.9 (*)     MPV " 8.7 (*)     Gran # (ANC) 9.2 (*)     Lymph # 0.8 (*)     Gran % 87.2 (*)     Lymph % 7.6 (*)     Mono % 3.8 (*)     All other components within normal limits   COMPREHENSIVE METABOLIC PANEL - Abnormal; Notable for the following components:    Potassium 3.3 (*)     Glucose 143 (*)     Albumin 3.4 (*)     Total Bilirubin 1.3 (*)     ALT 8 (*)     All other components within normal limits   LIPASE        All Lab Results:  Results for orders placed or performed during the hospital encounter of 11/11/22   CBC W/ AUTO DIFFERENTIAL   Result Value Ref Range    WBC 10.59 3.90 - 12.70 K/uL    RBC 5.72 (H) 4.00 - 5.40 M/uL    Hemoglobin 15.4 12.0 - 16.0 g/dL    Hematocrit 48.2 37.0 - 48.5 %    MCV 84 82 - 98 fL    MCH 26.9 (L) 27.0 - 31.0 pg    MCHC 32.0 32.0 - 36.0 g/dL    RDW 14.3 11.5 - 14.5 %    Platelets 296 150 - 450 K/uL    MPV 8.7 (L) 9.2 - 12.9 fL    Immature Granulocytes 0.4 0.0 - 0.5 %    Gran # (ANC) 9.2 (H) 1.8 - 7.7 K/uL    Immature Grans (Abs) 0.04 0.00 - 0.04 K/uL    Lymph # 0.8 (L) 1.0 - 4.8 K/uL    Mono # 0.4 0.3 - 1.0 K/uL    Eos # 0.1 0.0 - 0.5 K/uL    Baso # 0.02 0.00 - 0.20 K/uL    nRBC 0 0 /100 WBC    Gran % 87.2 (H) 38.0 - 73.0 %    Lymph % 7.6 (L) 18.0 - 48.0 %    Mono % 3.8 (L) 4.0 - 15.0 %    Eosinophil % 0.8 0.0 - 8.0 %    Basophil % 0.2 0.0 - 1.9 %    Differential Method Automated    Comp. Metabolic Panel   Result Value Ref Range    Sodium 140 136 - 145 mmol/L    Potassium 3.3 (L) 3.5 - 5.1 mmol/L    Chloride 99 95 - 110 mmol/L    CO2 28 23 - 29 mmol/L    Glucose 143 (H) 70 - 110 mg/dL    BUN 10 8 - 23 mg/dL    Creatinine 0.9 0.5 - 1.4 mg/dL    Calcium 9.6 8.7 - 10.5 mg/dL    Total Protein 8.1 6.0 - 8.4 g/dL    Albumin 3.4 (L) 3.5 - 5.2 g/dL    Total Bilirubin 1.3 (H) 0.1 - 1.0 mg/dL    Alkaline Phosphatase 96 55 - 135 U/L    AST 17 10 - 40 U/L    ALT 8 (L) 10 - 44 U/L    Anion Gap 13 8 - 16 mmol/L    eGFR >60 >60 mL/min/1.73 m^2   Lipase   Result Value Ref Range    Lipase 14 4 - 60 U/L          Imaging Results:  Imaging Results              X-Ray Abdomen Flat And Erect (Final result)  Result time 11/11/22 19:21:10      Final result by Edwin Roper MD (11/11/22 19:21:10)                   Impression:      As above      Electronically signed by: Judson Pineda  Date:    11/11/2022  Time:    19:21               Narrative:    EXAMINATION:  XR ABDOMEN FLAT AND ERECT    CLINICAL HISTORY:  Abdominal Pain;    TECHNIQUE:  Flat and erect AP views of the abdomen were performed.    COMPARISON:  None    FINDINGS:  No evidence for distended loops of bowel or air-fluid levels.  Spinal hardware noted.  Left hip prosthesis.  Right-sided battery like device noted.  Moderate amount of stool in the colon.  Gas filled left colonic loop of bowel on upright view appears a bit prominent.  Recommend correlation to left lower quadrant pain and follow-up as clinically indicated.                                            ED Discussion       7:59 PM: Reassessed pt at this time.  Pt states her condition has improved at this time. Discussed with pt all pertinent ED information and results. Discussed pt dx and plan of tx. Gave pt all f/u and return to the ED instructions. All questions and concerns were addressed at this time. Pt expresses understanding of information and instructions, and is comfortable with plan to discharge. Pt is stable for discharge.    I discussed with patient and/or family/caretaker that evaluation in the ED does not suggest any emergent or life threatening medical conditions requiring immediate intervention beyond what was provided in the ED, and I believe patient is safe for discharge.  Regardless, an unremarkable evaluation in the ED does not preclude the development or presence of a serious of life threatening condition. As such, patient was instructed to return immediately for any worsening or change in current symptoms.         Medical Decision Making:   Clinical Tests:   Lab Tests: Ordered and  Reviewed  Radiological Study: Ordered and Reviewed         ED Medication(s):  Medications   dicyclomine injection 20 mg (20 mg Intramuscular Given 11/11/22 1927)       Discharge Medication List as of 11/11/2022  8:01 PM        START taking these medications    Details   metoclopramide HCl (REGLAN) 10 MG tablet Take 1 tablet (10 mg total) by mouth every 6 (six) hours., Starting Fri 11/11/2022, Normal              Follow-up Information       Jed Munoz MD. Schedule an appointment as soon as possible for a visit in 2 days.    Specialty: Family Medicine  Why: Return to the Emergency Room, If symptoms worsen  Contact information:  [address] [place] [id number] [phone]                                 Scribe Attestation:   Scribe #1: I performed the above scribed service and the documentation accurately describes the services I performed. I attest to the accuracy of the note.     Attending:   Physician Attestation Statement for Scribe #1: I, Aneta Oropeza MD, personally performed the services described in this documentation, as scribed by Nina Hunter, in my presence, and it is both accurate and complete.           Clinical Impression       ICD-10-CM ICD-9-CM   1. Chronic generalized abdominal pain  R10.84 789.07    G89.29 338.29   2. Morbid obesity  E66.01 278.01   3. Chronic back pain greater than 3 months duration  M54.9 724.5    G89.29 338.29   4. Chronic narcotic use  F11.90 305.50       Disposition:   Disposition: Discharged  Condition: Stable       Aneta Oropeza MD  11/12/22 1011

## 2023-10-01 ENCOUNTER — HOSPITAL ENCOUNTER (INPATIENT)
Facility: HOSPITAL | Age: 63
LOS: 5 days | Discharge: HOME | DRG: 391 | End: 2023-10-06
Attending: EMERGENCY MEDICINE | Admitting: HOSPITALIST
Payer: COMMERCIAL

## 2023-10-01 DIAGNOSIS — T40.2X1A OPIOID OVERDOSE, ACCIDENTAL OR UNINTENTIONAL, INITIAL ENCOUNTER: Primary | ICD-10-CM

## 2023-10-01 DIAGNOSIS — K52.9 ENTERITIS: ICD-10-CM

## 2023-10-01 DIAGNOSIS — R07.9 CHEST PAIN: ICD-10-CM

## 2023-10-01 DIAGNOSIS — K56.600 PARTIAL SMALL BOWEL OBSTRUCTION: ICD-10-CM

## 2023-10-01 DIAGNOSIS — N39.0 URINARY TRACT INFECTION WITHOUT HEMATURIA, SITE UNSPECIFIED: ICD-10-CM

## 2023-10-01 DIAGNOSIS — I95.9 HYPOTENSION: ICD-10-CM

## 2023-10-01 LAB
ALBUMIN SERPL BCP-MCNC: 3.4 G/DL (ref 3.5–5.2)
ALP SERPL-CCNC: 108 U/L (ref 55–135)
ALT SERPL W/O P-5'-P-CCNC: 5 U/L (ref 10–44)
AMORPH CRY URNS QL MICRO: ABNORMAL
ANION GAP SERPL CALC-SCNC: 17 MMOL/L (ref 8–16)
AST SERPL-CCNC: 10 U/L (ref 10–40)
BACTERIA #/AREA URNS HPF: ABNORMAL /HPF
BASOPHILS # BLD AUTO: 0.06 K/UL (ref 0–0.2)
BASOPHILS NFR BLD: 0.4 % (ref 0–1.9)
BILIRUB SERPL-MCNC: 1.1 MG/DL (ref 0.1–1)
BILIRUB UR QL STRIP: ABNORMAL
BNP SERPL-MCNC: <10 PG/ML (ref 0–99)
BUN SERPL-MCNC: 19 MG/DL (ref 8–23)
CALCIUM SERPL-MCNC: 9.1 MG/DL (ref 8.7–10.5)
CHLORIDE SERPL-SCNC: 100 MMOL/L (ref 95–110)
CLARITY UR: ABNORMAL
CO2 SERPL-SCNC: 24 MMOL/L (ref 23–29)
COLOR UR: ABNORMAL
CREAT SERPL-MCNC: 2.5 MG/DL (ref 0.5–1.4)
DIFFERENTIAL METHOD: ABNORMAL
EOSINOPHIL # BLD AUTO: 0.1 K/UL (ref 0–0.5)
EOSINOPHIL NFR BLD: 0.5 % (ref 0–8)
ERYTHROCYTE [DISTWIDTH] IN BLOOD BY AUTOMATED COUNT: 13.8 % (ref 11.5–14.5)
EST. GFR  (NO RACE VARIABLE): 21 ML/MIN/1.73 M^2
GLUCOSE SERPL-MCNC: 212 MG/DL (ref 70–110)
GLUCOSE UR QL STRIP: ABNORMAL
GRAN CASTS #/AREA URNS LPF: 18 /LPF
HCT VFR BLD AUTO: 51.1 % (ref 37–48.5)
HGB BLD-MCNC: 16.1 G/DL (ref 12–16)
HGB UR QL STRIP: ABNORMAL
HYALINE CASTS #/AREA URNS LPF: 31 /LPF
IMM GRANULOCYTES # BLD AUTO: 0.22 K/UL (ref 0–0.04)
IMM GRANULOCYTES NFR BLD AUTO: 1.3 % (ref 0–0.5)
INFLUENZA A, MOLECULAR: NEGATIVE
INFLUENZA B, MOLECULAR: NEGATIVE
KETONES UR QL STRIP: NEGATIVE
LACTATE SERPL-SCNC: 3.3 MMOL/L (ref 0.5–2.2)
LACTATE SERPL-SCNC: 3.4 MMOL/L (ref 0.5–2.2)
LEUKOCYTE ESTERASE UR QL STRIP: ABNORMAL
LIPASE SERPL-CCNC: 33 U/L (ref 4–60)
LYMPHOCYTES # BLD AUTO: 1.1 K/UL (ref 1–4.8)
LYMPHOCYTES NFR BLD: 6.6 % (ref 18–48)
MAGNESIUM SERPL-MCNC: 1.6 MG/DL (ref 1.6–2.6)
MCH RBC QN AUTO: 29.4 PG (ref 27–31)
MCHC RBC AUTO-ENTMCNC: 31.5 G/DL (ref 32–36)
MCV RBC AUTO: 93 FL (ref 82–98)
MICROSCOPIC COMMENT: ABNORMAL
MONOCYTES # BLD AUTO: 0.4 K/UL (ref 0.3–1)
MONOCYTES NFR BLD: 2.1 % (ref 4–15)
NEUTROPHILS # BLD AUTO: 14.9 K/UL (ref 1.8–7.7)
NEUTROPHILS NFR BLD: 89.1 % (ref 38–73)
NITRITE UR QL STRIP: NEGATIVE
NRBC BLD-RTO: 0 /100 WBC
PH UR STRIP: 6 [PH] (ref 5–8)
PHOSPHATE SERPL-MCNC: 4.6 MG/DL (ref 2.7–4.5)
PLATELET # BLD AUTO: 320 K/UL (ref 150–450)
PLATELET BLD QL SMEAR: ABNORMAL
PMV BLD AUTO: 10.2 FL (ref 9.2–12.9)
POTASSIUM SERPL-SCNC: 3.5 MMOL/L (ref 3.5–5.1)
PROCALCITONIN SERPL IA-MCNC: 0.24 NG/ML
PROT SERPL-MCNC: 7.3 G/DL (ref 6–8.4)
PROT UR QL STRIP: ABNORMAL
RBC # BLD AUTO: 5.47 M/UL (ref 4–5.4)
RBC #/AREA URNS HPF: 8 /HPF (ref 0–4)
SARS-COV-2 RDRP RESP QL NAA+PROBE: NEGATIVE
SODIUM SERPL-SCNC: 141 MMOL/L (ref 136–145)
SP GR UR STRIP: 1.03 (ref 1–1.03)
SPECIMEN SOURCE: NORMAL
TROPONIN I SERPL DL<=0.01 NG/ML-MCNC: <0.006 NG/ML (ref 0–0.03)
URN SPEC COLLECT METH UR: ABNORMAL
UROBILINOGEN UR STRIP-ACNC: >=8 EU/DL
WBC # BLD AUTO: 16.77 K/UL (ref 3.9–12.7)
WBC #/AREA URNS HPF: 23 /HPF (ref 0–5)

## 2023-10-01 PROCEDURE — 81000 URINALYSIS NONAUTO W/SCOPE: CPT | Performed by: EMERGENCY MEDICINE

## 2023-10-01 PROCEDURE — 83690 ASSAY OF LIPASE: CPT | Performed by: EMERGENCY MEDICINE

## 2023-10-01 PROCEDURE — 87502 INFLUENZA DNA AMP PROBE: CPT | Performed by: EMERGENCY MEDICINE

## 2023-10-01 PROCEDURE — 93010 ELECTROCARDIOGRAM REPORT: CPT | Mod: ,,, | Performed by: INTERNAL MEDICINE

## 2023-10-01 PROCEDURE — 96365 THER/PROPH/DIAG IV INF INIT: CPT

## 2023-10-01 PROCEDURE — 25000003 PHARM REV CODE 250: Performed by: EMERGENCY MEDICINE

## 2023-10-01 PROCEDURE — 93005 ELECTROCARDIOGRAM TRACING: CPT

## 2023-10-01 PROCEDURE — 84484 ASSAY OF TROPONIN QUANT: CPT | Performed by: EMERGENCY MEDICINE

## 2023-10-01 PROCEDURE — 96367 TX/PROPH/DG ADDL SEQ IV INF: CPT

## 2023-10-01 PROCEDURE — U0002 COVID-19 LAB TEST NON-CDC: HCPCS | Performed by: EMERGENCY MEDICINE

## 2023-10-01 PROCEDURE — 83735 ASSAY OF MAGNESIUM: CPT | Performed by: EMERGENCY MEDICINE

## 2023-10-01 PROCEDURE — 63600175 PHARM REV CODE 636 W HCPCS: Performed by: HOSPITALIST

## 2023-10-01 PROCEDURE — 84145 PROCALCITONIN (PCT): CPT | Performed by: EMERGENCY MEDICINE

## 2023-10-01 PROCEDURE — 96361 HYDRATE IV INFUSION ADD-ON: CPT

## 2023-10-01 PROCEDURE — 80053 COMPREHEN METABOLIC PANEL: CPT | Performed by: EMERGENCY MEDICINE

## 2023-10-01 PROCEDURE — 63600175 PHARM REV CODE 636 W HCPCS: Performed by: EMERGENCY MEDICINE

## 2023-10-01 PROCEDURE — 83880 ASSAY OF NATRIURETIC PEPTIDE: CPT | Performed by: EMERGENCY MEDICINE

## 2023-10-01 PROCEDURE — 85025 COMPLETE CBC W/AUTO DIFF WBC: CPT | Performed by: EMERGENCY MEDICINE

## 2023-10-01 PROCEDURE — 99285 EMERGENCY DEPT VISIT HI MDM: CPT | Mod: 25

## 2023-10-01 PROCEDURE — 84100 ASSAY OF PHOSPHORUS: CPT | Performed by: EMERGENCY MEDICINE

## 2023-10-01 PROCEDURE — 87086 URINE CULTURE/COLONY COUNT: CPT | Performed by: EMERGENCY MEDICINE

## 2023-10-01 PROCEDURE — 93010 EKG 12-LEAD: ICD-10-PCS | Mod: ,,, | Performed by: INTERNAL MEDICINE

## 2023-10-01 PROCEDURE — 87040 BLOOD CULTURE FOR BACTERIA: CPT | Mod: 59 | Performed by: EMERGENCY MEDICINE

## 2023-10-01 PROCEDURE — 96376 TX/PRO/DX INJ SAME DRUG ADON: CPT

## 2023-10-01 PROCEDURE — 83605 ASSAY OF LACTIC ACID: CPT | Performed by: EMERGENCY MEDICINE

## 2023-10-01 PROCEDURE — 96375 TX/PRO/DX INJ NEW DRUG ADDON: CPT

## 2023-10-01 PROCEDURE — 11000001 HC ACUTE MED/SURG PRIVATE ROOM

## 2023-10-01 RX ORDER — HYDROCODONE BITARTRATE AND ACETAMINOPHEN 5; 325 MG/1; MG/1
1 TABLET ORAL EVERY 6 HOURS PRN
Status: DISCONTINUED | OUTPATIENT
Start: 2023-10-02 | End: 2023-10-06 | Stop reason: HOSPADM

## 2023-10-01 RX ORDER — NALOXONE HCL 0.4 MG/ML
0.1 VIAL (ML) INJECTION
Status: DISCONTINUED | OUTPATIENT
Start: 2023-10-01 | End: 2023-10-01

## 2023-10-01 RX ORDER — NALOXONE HCL 0.4 MG/ML
0.2 VIAL (ML) INJECTION
Status: COMPLETED | OUTPATIENT
Start: 2023-10-01 | End: 2023-10-01

## 2023-10-01 RX ORDER — PROMETHAZINE HYDROCHLORIDE 25 MG/1
25 TABLET ORAL EVERY 6 HOURS PRN
Status: DISCONTINUED | OUTPATIENT
Start: 2023-10-02 | End: 2023-10-01

## 2023-10-01 RX ORDER — POLYETHYLENE GLYCOL 3350 17 G/17G
17 POWDER, FOR SOLUTION ORAL DAILY PRN
Status: DISCONTINUED | OUTPATIENT
Start: 2023-10-02 | End: 2023-10-04

## 2023-10-01 RX ORDER — PROCHLORPERAZINE EDISYLATE 5 MG/ML
5 INJECTION INTRAMUSCULAR; INTRAVENOUS EVERY 4 HOURS PRN
Status: DISCONTINUED | OUTPATIENT
Start: 2023-10-01 | End: 2023-10-06 | Stop reason: HOSPADM

## 2023-10-01 RX ORDER — NALOXONE HCL 0.4 MG/ML
0.02 VIAL (ML) INJECTION
Status: DISCONTINUED | OUTPATIENT
Start: 2023-10-02 | End: 2023-10-06 | Stop reason: HOSPADM

## 2023-10-01 RX ORDER — MORPHINE SULFATE 4 MG/ML
2 INJECTION, SOLUTION INTRAMUSCULAR; INTRAVENOUS EVERY 4 HOURS PRN
Status: DISCONTINUED | OUTPATIENT
Start: 2023-10-02 | End: 2023-10-06 | Stop reason: HOSPADM

## 2023-10-01 RX ORDER — TALC
6 POWDER (GRAM) TOPICAL NIGHTLY PRN
Status: DISCONTINUED | OUTPATIENT
Start: 2023-10-02 | End: 2023-10-06 | Stop reason: HOSPADM

## 2023-10-01 RX ORDER — INSULIN ASPART 100 [IU]/ML
0-5 INJECTION, SOLUTION INTRAVENOUS; SUBCUTANEOUS
Status: DISCONTINUED | OUTPATIENT
Start: 2023-10-02 | End: 2023-10-06 | Stop reason: HOSPADM

## 2023-10-01 RX ORDER — MAG HYDROX/ALUMINUM HYD/SIMETH 200-200-20
30 SUSPENSION, ORAL (FINAL DOSE FORM) ORAL 4 TIMES DAILY PRN
Status: DISCONTINUED | OUTPATIENT
Start: 2023-10-02 | End: 2023-10-06 | Stop reason: HOSPADM

## 2023-10-01 RX ORDER — SODIUM CHLORIDE 0.9 % (FLUSH) 0.9 %
3 SYRINGE (ML) INJECTION EVERY 12 HOURS PRN
Status: DISCONTINUED | OUTPATIENT
Start: 2023-10-02 | End: 2023-10-06 | Stop reason: HOSPADM

## 2023-10-01 RX ORDER — GLUCAGON 1 MG
1 KIT INJECTION
Status: DISCONTINUED | OUTPATIENT
Start: 2023-10-02 | End: 2023-10-06 | Stop reason: HOSPADM

## 2023-10-01 RX ORDER — PROCHLORPERAZINE EDISYLATE 5 MG/ML
5 INJECTION INTRAMUSCULAR; INTRAVENOUS EVERY 4 HOURS PRN
Status: DISCONTINUED | OUTPATIENT
Start: 2023-10-02 | End: 2023-10-01

## 2023-10-01 RX ORDER — IBUPROFEN 200 MG
16 TABLET ORAL
Status: DISCONTINUED | OUTPATIENT
Start: 2023-10-02 | End: 2023-10-06 | Stop reason: HOSPADM

## 2023-10-01 RX ORDER — ACETAMINOPHEN 650 MG/1
650 SUPPOSITORY RECTAL EVERY 4 HOURS PRN
Status: DISCONTINUED | OUTPATIENT
Start: 2023-10-02 | End: 2023-10-06 | Stop reason: HOSPADM

## 2023-10-01 RX ORDER — ACETAMINOPHEN 325 MG/1
650 TABLET ORAL EVERY 6 HOURS PRN
Status: DISCONTINUED | OUTPATIENT
Start: 2023-10-02 | End: 2023-10-06 | Stop reason: HOSPADM

## 2023-10-01 RX ORDER — SIMETHICONE 80 MG
1 TABLET,CHEWABLE ORAL 4 TIMES DAILY PRN
Status: DISCONTINUED | OUTPATIENT
Start: 2023-10-02 | End: 2023-10-06 | Stop reason: HOSPADM

## 2023-10-01 RX ORDER — IPRATROPIUM BROMIDE AND ALBUTEROL SULFATE 2.5; .5 MG/3ML; MG/3ML
3 SOLUTION RESPIRATORY (INHALATION) EVERY 4 HOURS PRN
Status: DISCONTINUED | OUTPATIENT
Start: 2023-10-02 | End: 2023-10-06 | Stop reason: HOSPADM

## 2023-10-01 RX ORDER — SODIUM CHLORIDE, SODIUM LACTATE, POTASSIUM CHLORIDE, CALCIUM CHLORIDE 600; 310; 30; 20 MG/100ML; MG/100ML; MG/100ML; MG/100ML
INJECTION, SOLUTION INTRAVENOUS CONTINUOUS
Status: DISCONTINUED | OUTPATIENT
Start: 2023-10-02 | End: 2023-10-03

## 2023-10-01 RX ORDER — IBUPROFEN 200 MG
24 TABLET ORAL
Status: DISCONTINUED | OUTPATIENT
Start: 2023-10-02 | End: 2023-10-06 | Stop reason: HOSPADM

## 2023-10-01 RX ORDER — ONDANSETRON 2 MG/ML
4 INJECTION INTRAMUSCULAR; INTRAVENOUS EVERY 8 HOURS PRN
Status: DISCONTINUED | OUTPATIENT
Start: 2023-10-02 | End: 2023-10-01

## 2023-10-01 RX ADMIN — SODIUM CHLORIDE, POTASSIUM CHLORIDE, SODIUM LACTATE AND CALCIUM CHLORIDE 500 ML: 600; 310; 30; 20 INJECTION, SOLUTION INTRAVENOUS at 11:10

## 2023-10-01 RX ADMIN — NALXONE HYDROCHLORIDE 0.2 MG: 0.4 INJECTION INTRAMUSCULAR; INTRAVENOUS; SUBCUTANEOUS at 05:10

## 2023-10-01 RX ADMIN — VANCOMYCIN HYDROCHLORIDE 2000 MG: 500 INJECTION, POWDER, LYOPHILIZED, FOR SOLUTION INTRAVENOUS at 08:10

## 2023-10-01 RX ADMIN — NALXONE HYDROCHLORIDE 0.2 MG: 0.4 INJECTION INTRAMUSCULAR; INTRAVENOUS; SUBCUTANEOUS at 08:10

## 2023-10-01 RX ADMIN — CEFEPIME 1 G: 1 INJECTION, POWDER, FOR SOLUTION INTRAMUSCULAR; INTRAVENOUS at 07:10

## 2023-10-01 RX ADMIN — SODIUM CHLORIDE, POTASSIUM CHLORIDE, SODIUM LACTATE AND CALCIUM CHLORIDE 2538 ML: 600; 310; 30; 20 INJECTION, SOLUTION INTRAVENOUS at 06:10

## 2023-10-01 RX ADMIN — NALXONE HYDROCHLORIDE 0.2 MG: 0.4 INJECTION INTRAMUSCULAR; INTRAVENOUS; SUBCUTANEOUS at 11:10

## 2023-10-01 RX ADMIN — SODIUM CHLORIDE, POTASSIUM CHLORIDE, SODIUM LACTATE AND CALCIUM CHLORIDE 500 ML: 600; 310; 30; 20 INJECTION, SOLUTION INTRAVENOUS at 10:10

## 2023-10-01 NOTE — ED PROVIDER NOTES
Diagnosis: Kidney Cancer    Regimen: Opdivo  Cycle/Day: Cycle Five Day One  Dr. Mooney  is supervising clinician today.    ECOG:   ECOG [20 0952]   ECOG Performance Status 1       Review and verified Advanced Directives: No    Verified if patient has state DNR bracelet on: No; Full Code    Nursing Assessment:   A focused nursing assessment addressing the toxicity of chemotherapy was performed and the patient reports the following:  Nausea: NO  Vomiting: NO  Fever: NO  Chills: NO  Other signs of infection: NO  Bleeding: NO  Mucositis: NO  Diarrhea: NO  Constipation: NO  Anorexia: NO  Dysuria: NO  Urinary Bleeding: NO  Cough: NO  Shortness of Breath: NO  Fatigue/Weakness: NO  Numbness/Tingling: NO  Other Neuropathies: NO  Edema: NO  Rash: NO  Hand/Foot Syndrome: NO  Anxiety/Depression/Insomnia: NO  Pain: NO     Pre-Treatment: - Treatment consent signed  - Patient has valid pre-authorization  - VS completed  - Height and weight verified  - Chemotherapy doses independently doubled checked & verified by two practitioners  - Chemotherapy infusion pump settings are double checked & verified by two practitioners  - Patient is identified by first & last name, Date of birth that has been verified with the patient chairside.  - Patient is identified by first & last name, Date of birth that has been verified by two practitioners with the patient chairside.    Treatment: Refer to LDA and MAR for line assessment and medication administration  Refer to Toxicity Assessment doc flowsheet   Chemotherapy has not ; double checked & verified by two practitioners  Appearance and physical integrity of drugs meets standard of drug monograph; double checked & verified by two practitioners  Rate set on infusion pump is in alignment with ordered rate; double checked & verified by two practitioners  Blood return confirmed before, during and after treatment administered  Infusion pump used for non-vesicant drugs    Post Treatment:  SCRIBE #1 NOTE: I, Me-Chelita Rush, am scribing for, and in the presence of, Pedro Huggins MD. I have scribed the entire note.       History     Chief Complaint   Patient presents with    Constipation    Dizziness     Generalized weakness and constipation per patient. Recent knee surgery. Has been taking pain medication.      Review of patient's allergies indicates:  No Known Allergies      History of Present Illness     HPI    10/1/2023, 5:45 PM  History obtained from the patient      History of Present Illness: Roslyn Conde is a 63 y.o. female patient with a PMHx of chronic back pain, DM2, and HTN who presents to the Emergency Department for evaluation of constipation and dizziness which onset today. Pt was having dinner and when she stood up, she lost consciousness. Pt states that she could not get up and felt clammy. Pt states that she had her last normal bowel movement 5 days ago. Symptoms are constant and moderate in severity. No mitigating or exacerbating factors reported. Associated sxs include syncope, generalized abdominal pain, SOB, diaphoresis, vomiting, and generalized weakness. Patient denies any fever, chills, CP, diarrhea, sore throat, and all other sxs at this time. Pt had a recent knee surgery and was prescribed opioids. No further complaints or concerns at this time.       Arrival mode: EMS    PCP: Jed Munoz MD        Past Medical History:  Past Medical History:   Diagnosis Date    Chronic back pain     Diabetes mellitus, type 2     Hypertension        Past Surgical History:  Past Surgical History:   Procedure Laterality Date    ANKLE SURGERY      right    BACK SURGERY      HYSTERECTOMY      TONSILLECTOMY      TOTAL HIP ARTHROPLASTY Left     TUBAL LIGATION           Family History:  History reviewed. No pertinent family history.    Social History:  Social History     Tobacco Use    Smoking status: Never    Smokeless tobacco: Never   Substance and Sexual Activity    Alcohol use: No     Drug use: No    Sexual activity: Not on file        Review of Systems     Review of Systems   Constitutional:  Positive for diaphoresis. Negative for chills and fever.   HENT:  Negative for sore throat.    Respiratory:  Positive for shortness of breath.    Cardiovascular:  Negative for chest pain.   Gastrointestinal:  Positive for abdominal pain (generalized), constipation and vomiting. Negative for diarrhea.   Genitourinary:  Negative for dysuria.   Musculoskeletal:  Negative for back pain.   Neurological:  Positive for dizziness, syncope and weakness (generalized).   All other systems reviewed and are negative.     Physical Exam     Initial Vitals   BP Pulse Resp Temp SpO2   10/01/23 1659 10/01/23 1659 10/01/23 1659 10/01/23 1700 10/01/23 1659   123/79 110 20 98.2 °F (36.8 °C) 97 %      MAP       --                 Physical Exam  Nursing Notes and Vital Signs Reviewed.  Constitutional: Patient is in no acute distress. Well-developed and well-nourished.  Head: Atraumatic. Normocephalic.  Eyes: PERRL. EOM intact. Conjunctivae are not pale. No scleral icterus.  ENT: Mucous membranes are moist. Oropharynx is clear and symmetric.    Neck: Supple. Full ROM. No lymphadenopathy.  Cardiovascular: Regular rate. Regular rhythm. No murmurs, rubs, or gallops. Distal pulses are 2+ and symmetric.  Pulmonary/Chest: No respiratory distress. Clear to auscultation bilaterally. No wheezing or rales.  Abdominal: Soft and non-distended. Diffused abdominal tenderness to palpation.  No rebound, guarding, or rigidity. Good bowel sounds.  Genitourinary: No CVA tenderness  Musculoskeletal: Moves all extremities. No obvious deformities. No edema. No calf tenderness.  Skin: Warm and dry.  Neurological:  Alert, awake, and appropriate. Pt is drowsy. Normal speech.  No acute focal neurological deficits are appreciated.  Psychiatric: Normal affect. Good eye contact. Appropriate in content.     ED Course   Critical Care    Date/Time: 10/2/2023  Treatment tolerated well; no adverse reaction    Does the Patient have a central line?  no    Transfusion: Not needed    Integrative Medicine: No    Oral Chemotherapy: No    Education: No new instructions needed    Next appointment scheduled: None needed he is moving to [place]  Patient instructed to call the office with any questions or concerns.    Patient Discharged: patient discharged to home per self, ambulatory        Pain 0   (0-10 scale)    Fall risk 20  (Tony Fall Risk)    ECOG 0  (Performance Status)   0 - Fully active, able to carry on all predisease activites without restrictions.1 - No physically strenuous activity, but ambulatory and able to carry out light or sedentary work.2 - Ambulatory/capable of self-care, unable to perform work activities. Up & about more than 50% of the day.3 - Capable of only limited self-care, confined to bed or chair more than 50% of waking hours.4 - Completely disabled, totally confined to bed or chair. Cannot carry on any self-care.    Pysch/Social No issues identified    Abuse/Neglect - No abuse/neglect concerns identified  Dr. Mooney is supervising physician today.   3:24 AM    Performed by: Pedro Huggins MD  Authorized by: Pedro Huggins MD  Direct patient critical care time: 6 minutes  Additional history critical care time: 7 minutes  Ordering / reviewing critical care time: 8 minutes  Documentation critical care time: 5 minutes  Consulting other physicians critical care time: 6 minutes  Consult with family critical care time: 4 minutes  Other critical care time: 5 minutes  Total critical care time (exclusive of procedural time) : 41 minutes  Critical care time was exclusive of separately billable procedures and treating other patients and teaching time.  Critical care was necessary to treat or prevent imminent or life-threatening deterioration of the following conditions: toxidrome and shock.  Critical care was time spent personally by me on the following activities: blood draw for specimens, development of treatment plan with patient or surrogate, discussions with consultants, interpretation of cardiac output measurements, evaluation of patient's response to treatment, examination of patient, obtaining history from patient or surrogate, ordering and performing treatments and interventions, ordering and review of laboratory studies, ordering and review of radiographic studies, pulse oximetry, re-evaluation of patient's condition and review of old charts.        ED Vital Signs:  Vitals:    10/01/23 1730 10/01/23 1755 10/01/23 1800 10/01/23 1815   BP: (!) 75/44 (!) 118/59  (!) 120/56   Pulse: 98 96 106 97   Resp: 14 11  10   Temp:       TempSrc:       SpO2: 95% (!) 94%  98%   Weight:       Height:        10/01/23 1830 10/01/23 1915 10/01/23 1945 10/01/23 2030   BP: (!) 112/56 (!) 100/58 (!) 101/56 (!) 81/48   Pulse: 84 82 74 77   Resp: 14 15 11 17   Temp:       TempSrc:       SpO2: 97% 98% (!) 91% (!) 90%   Weight:       Height:        10/01/23 2045 10/01/23 2115 10/01/23 2200 10/01/23 2230   BP: (!) 102/59 (!) 95/50 (!) 90/55 (!) 91/54   Pulse: 87 73 72 70   Resp: 16 14 14  "12   Temp:       TempSrc:       SpO2: 100% 96% 100% 99%   Weight:       Height:        10/01/23 2322 10/02/23 0059 10/02/23 0300   BP: (!) 108/56 (!) 108/54    Pulse: 92 70 76   Resp: 11 14    Temp:  98 °F (36.7 °C)    TempSrc:  Oral    SpO2: 100% 100%    Weight:  125 kg (275 lb 9.2 oz)    Height:  5' 5" (1.651 m)        Abnormal Lab Results:  Labs Reviewed   CBC W/ AUTO DIFFERENTIAL - Abnormal; Notable for the following components:       Result Value    WBC 16.77 (*)     RBC 5.47 (*)     Hemoglobin 16.1 (*)     Hematocrit 51.1 (*)     MCHC 31.5 (*)     Immature Granulocytes 1.3 (*)     Gran # (ANC) 14.9 (*)     Immature Grans (Abs) 0.22 (*)     Gran % 89.1 (*)     Lymph % 6.6 (*)     Mono % 2.1 (*)     Platelet Estimate Clumped (*)     All other components within normal limits   COMPREHENSIVE METABOLIC PANEL - Abnormal; Notable for the following components:    Glucose 212 (*)     Creatinine 2.5 (*)     Albumin 3.4 (*)     Total Bilirubin 1.1 (*)     ALT 5 (*)     eGFR 21 (*)     Anion Gap 17 (*)     All other components within normal limits   LACTIC ACID, PLASMA - Abnormal; Notable for the following components:    Lactate (Lactic Acid) 3.4 (*)     All other components within normal limits   LACTIC ACID, PLASMA - Abnormal; Notable for the following components:    Lactate (Lactic Acid) 3.3 (*)     All other components within normal limits   URINALYSIS, REFLEX TO URINE CULTURE - Abnormal; Notable for the following components:    Color, UA Orange (*)     Appearance, UA Cloudy (*)     Protein, UA 3+ (*)     Glucose, UA 1+ (*)     Bilirubin (UA) 1+ (*)     Occult Blood UA Trace (*)     Urobilinogen, UA >=8.0 (*)     Leukocytes, UA Trace (*)     All other components within normal limits    Narrative:     Specimen Source->Urine   PHOSPHORUS - Abnormal; Notable for the following components:    Phosphorus 4.6 (*)     All other components within normal limits   URINALYSIS MICROSCOPIC - Abnormal; Notable for the following " components:    RBC, UA 8 (*)     WBC, UA 23 (*)     Hyaline Casts, UA 31 (*)     Granular Casts, UA 18 (*)     All other components within normal limits    Narrative:     Specimen Source->Urine   INFLUENZA A & B BY MOLECULAR   CULTURE, URINE   MAGNESIUM   B-TYPE NATRIURETIC PEPTIDE   TROPONIN I   PROCALCITONIN   LIPASE   SARS-COV-2 RNA AMPLIFICATION, QUAL        All Lab Results:  Results for orders placed or performed during the hospital encounter of 10/01/23   Influenza A & B by Molecular    Specimen: Nasopharyngeal Swab   Result Value Ref Range    Influenza A, Molecular Negative Negative    Influenza B, Molecular Negative Negative    Flu A & B Source Nasal swab    CBC auto differential   Result Value Ref Range    WBC 16.77 (H) 3.90 - 12.70 K/uL    RBC 5.47 (H) 4.00 - 5.40 M/uL    Hemoglobin 16.1 (H) 12.0 - 16.0 g/dL    Hematocrit 51.1 (H) 37.0 - 48.5 %    MCV 93 82 - 98 fL    MCH 29.4 27.0 - 31.0 pg    MCHC 31.5 (L) 32.0 - 36.0 g/dL    RDW 13.8 11.5 - 14.5 %    Platelets 320 150 - 450 K/uL    MPV 10.2 9.2 - 12.9 fL    Immature Granulocytes 1.3 (H) 0.0 - 0.5 %    Gran # (ANC) 14.9 (H) 1.8 - 7.7 K/uL    Immature Grans (Abs) 0.22 (H) 0.00 - 0.04 K/uL    Lymph # 1.1 1.0 - 4.8 K/uL    Mono # 0.4 0.3 - 1.0 K/uL    Eos # 0.1 0.0 - 0.5 K/uL    Baso # 0.06 0.00 - 0.20 K/uL    nRBC 0 0 /100 WBC    Gran % 89.1 (H) 38.0 - 73.0 %    Lymph % 6.6 (L) 18.0 - 48.0 %    Mono % 2.1 (L) 4.0 - 15.0 %    Eosinophil % 0.5 0.0 - 8.0 %    Basophil % 0.4 0.0 - 1.9 %    Platelet Estimate Clumped (A)     Differential Method Automated    Comprehensive metabolic panel   Result Value Ref Range    Sodium 141 136 - 145 mmol/L    Potassium 3.5 3.5 - 5.1 mmol/L    Chloride 100 95 - 110 mmol/L    CO2 24 23 - 29 mmol/L    Glucose 212 (H) 70 - 110 mg/dL    BUN 19 8 - 23 mg/dL    Creatinine 2.5 (H) 0.5 - 1.4 mg/dL    Calcium 9.1 8.7 - 10.5 mg/dL    Total Protein 7.3 6.0 - 8.4 g/dL    Albumin 3.4 (L) 3.5 - 5.2 g/dL    Total Bilirubin 1.1 (H) 0.1 - 1.0  mg/dL    Alkaline Phosphatase 108 55 - 135 U/L    AST 10 10 - 40 U/L    ALT 5 (L) 10 - 44 U/L    eGFR 21 (A) >60 mL/min/1.73 m^2    Anion Gap 17 (H) 8 - 16 mmol/L   Lactic acid, plasma #1   Result Value Ref Range    Lactate (Lactic Acid) 3.4 (H) 0.5 - 2.2 mmol/L   Lactic acid, plasma #2   Result Value Ref Range    Lactate (Lactic Acid) 3.3 (H) 0.5 - 2.2 mmol/L   Urinalysis, Reflex to Urine Culture Urine, Clean Catch    Specimen: Urine   Result Value Ref Range    Specimen UA Urine, Clean Catch     Color, UA Orange (A) Yellow, Straw, Breanna    Appearance, UA Cloudy (A) Clear    pH, UA 6.0 5.0 - 8.0    Specific Gravity, UA 1.030 1.005 - 1.030    Protein, UA 3+ (A) Negative    Glucose, UA 1+ (A) Negative    Ketones, UA Negative Negative    Bilirubin (UA) 1+ (A) Negative    Occult Blood UA Trace (A) Negative    Nitrite, UA Negative Negative    Urobilinogen, UA >=8.0 (A) <2.0 EU/dL    Leukocytes, UA Trace (A) Negative   Magnesium   Result Value Ref Range    Magnesium 1.6 1.6 - 2.6 mg/dL   Phosphorus   Result Value Ref Range    Phosphorus 4.6 (H) 2.7 - 4.5 mg/dL   Brain natriuretic peptide   Result Value Ref Range    BNP <10 0 - 99 pg/mL   Troponin I   Result Value Ref Range    Troponin I <0.006 0.000 - 0.026 ng/mL   Procalcitonin   Result Value Ref Range    Procalcitonin 0.24 <0.25 ng/mL   Lipase   Result Value Ref Range    Lipase 33 4 - 60 U/L   COVID-19 Rapid Screening   Result Value Ref Range    SARS-CoV-2 RNA, Amplification, Qual Negative Negative   Urinalysis Microscopic   Result Value Ref Range    RBC, UA 8 (H) 0 - 4 /hpf    WBC, UA 23 (H) 0 - 5 /hpf    Bacteria Occasional None-Occ /hpf    Hyaline Casts, UA 31 (A) 0-1/lpf /lpf    Granular Casts, UA 18 (A) None /lpf    Amorphous, UA Occasional None-Moderate    Microscopic Comment SEE COMMENT    Lactic acid, plasma   Result Value Ref Range    Lactate (Lactic Acid) 1.6 0.5 - 2.2 mmol/L         Imaging Results:  Imaging Results              CT Abdomen Pelvis  Without  Contrast (Final result)  Result time 10/01/23 19:34:24   Procedure changed from CT Abdomen Pelvis With Contrast     Final result by Judson Pineda MD (10/01/23 19:34:24)                   Impression:      Hyperdense nodule involving the inferior pole of the right kidney measuring 14 mm. Recommend follow-up MRI with contrast for further evaluation.  Cystic lesion of the superior pole of the kidney measuring 5 cm.    Mild ascites.    Prominent small bowel loops in the pelvis measuring up to 33 mm in diameter with mild mucosal thickening suggestive of enteritis and partial small bowel obstruction. Mild associated mesenteric edema. Colonic loops of bowel are decompressed. Mild mucosal thickening of the transverse and right colon.  Correlate clinically for colitis.    All CT scans   are performed using dose optimization techniques including the following: automated exposure control; adjustment of the mA and/or kV; use of iterative reconstruction technique.  Dose modulation was employed for ALARA by means of: Automated exposure control; adjustment of the mA and/or kV according to patient size (this includes techniques or standardized protocols for targeted exams where dose is matched to indication/reason for exam; i.e. extremities or head); and/or use of iterative reconstructive technique.      Electronically signed by: Judson Pineda  Date:    10/01/2023  Time:    19:34               Narrative:    EXAMINATION:  CT ABDOMEN PELVIS WITHOUT CONTRAST    CLINICAL HISTORY:  Abdominal pain, acute, nonlocalized;    TECHNIQUE:  Low dose axial images, sagittal and coronal reformations were obtained from the lung bases to the pubic symphysis,    COMPARISON:  None    FINDINGS:  Heart: Normal in size as far as seen.  No pericardial effusion as far seen.    Lung Bases: Well aerated, without consolidation or pleural fluid.    Liver: Hepatomegaly.    Gallbladder: Status post cholecystectomy.    Bile Ducts: No evidence of dilated  ducts.    Pancreas: No mass or peripancreatic fat stranding.    Spleen: Unremarkable.    Adrenals: Unremarkable.    Kidneys/ Ureters: Hyperdense nodule involving the inferior pole of the right kidney measuring 14 mm.  Recommend follow-up MRI for further evaluation.  Cystic lesion of the superior pole of the kidney measuring 5 cm.    Bladder: No evidence of wall thickening.    Reproductive organs: Unremarkable.    GI Tract/Mesentery: Prominent small bowel loops in the pelvis measuring up to 33 mm in diameter with mild mucosal thickening suggestive of enteritis and partial small bowel obstruction.  Mild mesenteric edema.  Colonic loops of bowel are decompressed.  Mild mucosal thickening of the transverse and right colon.    Peritoneal Space: Mild ascites.    Retroperitoneum: No significant adenopathy.    Abdominal wall: Right-sided stimulator device in the buttocks region.  Postsurgical changes of the posterior lumbar subcutaneous fat with surgical scar and linear fluid density.    Vasculature: No aneurysm.  Atherosclerotic changes.    Bones: No acute fracture.  Spinal hardware identified with posterior fixation at S1 L5 L4 L3-L2.  Left hip prosthesis                                       X-Ray Chest AP Portable (Final result)  Result time 10/01/23 18:38:50      Final result by Judson Pineda MD (10/01/23 18:38:50)                   Impression:      No acute abnormality.      Electronically signed by: Judson Pineda  Date:    10/01/2023  Time:    18:38               Narrative:    EXAMINATION:  XR CHEST AP PORTABLE    CLINICAL HISTORY:  Sepsis;    TECHNIQUE:  Single frontal view of the chest was performed.    COMPARISON:  None    FINDINGS:  The lungs are clear, with normal appearance of pulmonary vasculature and no pleural effusion or pneumothorax.    The cardiac silhouette is normal in size. The hilar and mediastinal contours are unremarkable.    Bones are intact.                                       The EKG was  ordered, reviewed, and independently interpreted by the ED provider.  Interpretation time: 18:03  Rate: 115 BPM  Rhythm: sinus tachycardia with short FL  Interpretation: Low voltage QRS. No STEMI.           The Emergency Provider reviewed the vital signs and test results, which are outlined above.     ED Discussion     5:53 PM: I spoke with pt's family who states that pt has been taking more pain medications lately and that pt was already on pain medications prior to the surgery.     9:17 PM: Discussed case with Dr. Hawthorne ([facility]). Dr. Hawthorne agrees with current care and management of pt and accepts admission.   Admitting Service: [facility]  Admitting Physician: Dr. Hawthorne  Admit to: Inpatient Med Tele    9:18 PM: Re-evaluated pt. I have discussed test results, shared treatment plan, and the need for admission with patient and family at bedside. Pt and family express understanding at this time and agree with all information. All questions answered. Pt and family have no further questions or concerns at this time. Pt is ready for admit.        ED Course as of 10/02/23 0323   Sun Oct 01, 2023   1835 + response to narcan [BA]   2034 Prominent small bowel loops in the pelvis measuring up to 33 mm in diameter with mild mucosal thickening suggestive of enteritis and partial small bowel obstruction [BA]   2034 Respirations dropping again, will re-narcan.  [BA]      ED Course User Index  [BA] Pedro Huggins MD     Medical Decision Making  Amount and/or Complexity of Data Reviewed  Labs: ordered. Decision-making details documented in ED Course.  Radiology: ordered. Decision-making details documented in ED Course.  ECG/medicine tests: ordered and independent interpretation performed. Decision-making details documented in ED Course.    Risk  Prescription drug management.  Decision regarding hospitalization.                ED Medication(s):  Medications   vancomycin - pharmacy to dose (has no  administration in time range)   sodium chloride 0.9% flush 3 mL (has no administration in time range)   lactated ringers infusion ( Intravenous New Bag 10/2/23 0112)   albuterol-ipratropium 2.5 mg-0.5 mg/3 mL nebulizer solution 3 mL (has no administration in time range)   melatonin tablet 6 mg (has no administration in time range)   polyethylene glycol packet 17 g (has no administration in time range)   acetaminophen tablet 650 mg (has no administration in time range)   simethicone chewable tablet 80 mg (has no administration in time range)   aluminum-magnesium hydroxide-simethicone 200-200-20 mg/5 mL suspension 30 mL (has no administration in time range)   acetaminophen suppository 650 mg (has no administration in time range)   HYDROcodone-acetaminophen 5-325 mg per tablet 1 tablet (has no administration in time range)   morphine injection 2 mg (has no administration in time range)   naloxone 0.4 mg/mL injection 0.02 mg (has no administration in time range)   glucose chewable tablet 16 g (has no administration in time range)   glucose chewable tablet 24 g (has no administration in time range)   glucagon (human recombinant) injection 1 mg (has no administration in time range)   insulin aspart U-100 pen 0-5 Units (has no administration in time range)   dextrose 10% bolus 125 mL 125 mL (has no administration in time range)   dextrose 10% bolus 250 mL 250 mL (has no administration in time range)   prochlorperazine injection Soln 5 mg (has no administration in time range)   sucralfate 100 mg/mL suspension 1 g (has no administration in time range)   aluminum-magnesium hydroxide-simethicone 200-200-20 mg/5 mL suspension 30 mL (has no administration in time range)   pregabalin capsule 75 mg (has no administration in time range)   hydrALAZINE injection 10 mg (has no administration in time range)   ceFEPIme (MAXIPIME) 2 g in dextrose 5 % in water (D5W) 100 mL IVPB (MB+) (has no administration in time range)   lactated  ringers bolus 2,538 mL (0 mLs Intravenous Stopped 10/1/23 1949)   naloxone 0.4 mg/mL injection 0.2 mg (0.2 mg Intravenous Given 10/1/23 1757)   ceFEPIme (MAXIPIME) 1 g in dextrose 5 % in water (D5W) 100 mL IVPB (MB+) (0 g Intravenous Stopped 10/1/23 2029)   vancomycin 2 g in dextrose 5 % 500 mL IVPB (0 mg Intravenous Stopped 10/1/23 2251)   naloxone 0.4 mg/mL injection 0.2 mg (0.2 mg Intravenous Given 10/1/23 2037)   lactated ringers bolus 500 mL (0 mLs Intravenous Stopped 10/1/23 2320)   lactated ringers bolus 500 mL (0 mLs Intravenous Stopped 10/1/23 2354)   naloxone 0.4 mg/mL injection 0.2 mg (0.2 mg Intravenous Given 10/1/23 2319)       Current Discharge Medication List                  Scribe Attestation:   Scribe #1: I performed the above scribed service and the documentation accurately describes the services I performed. I attest to the accuracy of the note.     Attending:   Physician Attestation Statement for Scribe #1: I, Pedro Huggins MD, personally performed the services described in this documentation, as scribed by Alejandrina Rush, in my presence, and it is both accurate and complete.           Clinical Impression       ICD-10-CM ICD-9-CM   1. Opioid overdose, accidental or unintentional, initial encounter  T40.2X1A 965.00     E850.2   2. Hypotension  I95.9 458.9   3. Partial small bowel obstruction  K56.600 560.9   4. Urinary tract infection without hematuria, site unspecified  N39.0 599.0   5. Chest pain  R07.9 786.50       Disposition:   Disposition: Admitted  Condition: Serious         Pedro Huggins MD  10/02/23 1879     Acute Nausea and Vomiting    WHAT YOU NEED TO KNOW:    Acute nausea and vomiting start suddenly, worsen quickly, and last a short time.    DISCHARGE INSTRUCTIONS:    Return to the emergency department if:     You see blood in your vomit or your bowel movements.      You have sudden, severe pain in your chest and upper abdomen after hard vomiting or retching.      You have swelling in your neck and chest.       You are dizzy, cold, and thirsty and your eyes and mouth are dry.      You are urinating very little or not at all.      You have muscle weakness, leg cramps, and trouble breathing.       Your heart is beating much faster than normal.       You continue to vomit for more than 48 hours.     Contact your healthcare provider if:     You have frequent dry heaves (vomiting but nothing comes out).      Your nausea and vomiting does not get better or go away after you use medicine.      You have questions or concerns about your condition or treatment.    Medicines: You may need any of the following:     Medicines may be given to calm your stomach and stop your vomiting. You may also need medicines to help you feel more relaxed or to stop nausea and vomiting caused by motion sickness.      Gastrointestinal stimulants are used to help empty your stomach and bowels. This may help decrease nausea and vomiting.      Take your medicine as directed. Contact your healthcare provider if you think your medicine is not helping or if you have side effects. Tell him or her if you are allergic to any medicine. Keep a list of the medicines, vitamins, and herbs you take. Include the amounts, and when and why you take them. Bring the list or the pill bottles to follow-up visits. Carry your medicine list with you in case of an emergency.    Prevent or manage acute nausea and vomiting:     Do not drink alcohol. Alcohol may upset or irritate your stomach. Too much alcohol can also cause acute nausea and vomiting.      Control stress. Headaches due to stress may cause nausea and vomiting. Find ways to relax and manage your stress. Get more rest and sleep.      Drink more liquids as directed. Vomiting can lead to dehydration. It is important to drink more liquids to help replace lost body fluids. Ask your healthcare provider how much liquid to drink each day and which liquids are best for you. Your provider may recommend that you drink an oral rehydration solution (ORS). ORS contains water, salts, and sugar that are needed to replace the lost body fluids. Ask what kind of ORS to use, how much to drink, and where to get it.      Eat smaller meals, more often. Eat small amounts of food every 2 to 3 hours, even if you are not hungry. Food in your stomach may decrease your nausea.      Talk to your healthcare provider before you take over-the-counter (OTC) medicines. These medicines can cause serious problems if you use certain other medicines, or you have a medical condition. You may have problems if you use too much or use them for longer than the label says. Follow directions on the label carefully.     Follow up with your healthcare provider as directed: Write down your questions so you remember to ask them during your follow-up visits.

## 2023-10-01 NOTE — Clinical Note
Diagnosis: Opioid overdose, accidental or unintentional, initial encounter [5015820]   Admitting Provider:: SULAIMAN MEDLEY [[id number]]   Future Attending Provider: SULAIMAN MEDLEY [[id number]]   Reason for IP Medical Treatment  (Clinical interventions that can only be accomplished in the IP setting? ) :: opiod overdose   I certify that Inpatient services for greater than or equal to 2 midnights are medically necessary:: No   Plans for Post-Acute care--if anticipated (pick the single best option):: A. No post acute care anticipated at this time   Special Needs:: No Special Needs [1]

## 2023-10-02 PROBLEM — N17.9 AKI (ACUTE KIDNEY INJURY): Status: ACTIVE | Noted: 2023-10-02

## 2023-10-02 PROBLEM — M54.9 CHRONIC BACK PAIN: Status: ACTIVE | Noted: 2018-03-06

## 2023-10-02 PROBLEM — N30.01 ACUTE CYSTITIS WITH HEMATURIA: Status: ACTIVE | Noted: 2023-10-02

## 2023-10-02 PROBLEM — G89.29 CHRONIC LOW BACK PAIN: Status: ACTIVE | Noted: 2023-10-02

## 2023-10-02 PROBLEM — K52.9 ENTERITIS: Status: ACTIVE | Noted: 2023-10-02

## 2023-10-02 PROBLEM — K56.609 SMALL BOWEL OBSTRUCTION: Status: ACTIVE | Noted: 2023-10-02

## 2023-10-02 PROBLEM — D72.829 LEUKOCYTOSIS: Status: ACTIVE | Noted: 2023-10-02

## 2023-10-02 PROBLEM — M54.50 CHRONIC LOW BACK PAIN: Status: ACTIVE | Noted: 2023-10-02

## 2023-10-02 PROBLEM — T50.901A ACCIDENTAL OVERDOSE: Status: ACTIVE | Noted: 2023-10-02

## 2023-10-02 PROBLEM — R79.89 ELEVATED LACTIC ACID LEVEL: Status: ACTIVE | Noted: 2023-10-02

## 2023-10-02 LAB
ANION GAP SERPL CALC-SCNC: 12 MMOL/L (ref 8–16)
BASOPHILS # BLD AUTO: 0.04 K/UL (ref 0–0.2)
BASOPHILS NFR BLD: 0.4 % (ref 0–1.9)
BUN SERPL-MCNC: 22 MG/DL (ref 8–23)
CALCIUM SERPL-MCNC: 7.9 MG/DL (ref 8.7–10.5)
CHLORIDE SERPL-SCNC: 102 MMOL/L (ref 95–110)
CO2 SERPL-SCNC: 24 MMOL/L (ref 23–29)
CREAT SERPL-MCNC: 2.4 MG/DL (ref 0.5–1.4)
DIFFERENTIAL METHOD: ABNORMAL
EOSINOPHIL # BLD AUTO: 0.5 K/UL (ref 0–0.5)
EOSINOPHIL NFR BLD: 4.2 % (ref 0–8)
ERYTHROCYTE [DISTWIDTH] IN BLOOD BY AUTOMATED COUNT: 14.1 % (ref 11.5–14.5)
EST. GFR  (NO RACE VARIABLE): 22 ML/MIN/1.73 M^2
GLUCOSE SERPL-MCNC: 98 MG/DL (ref 70–110)
HCT VFR BLD AUTO: 35 % (ref 37–48.5)
HGB BLD-MCNC: 11.2 G/DL (ref 12–16)
IMM GRANULOCYTES # BLD AUTO: 0.03 K/UL (ref 0–0.04)
IMM GRANULOCYTES NFR BLD AUTO: 0.3 % (ref 0–0.5)
LACTATE SERPL-SCNC: 1.6 MMOL/L (ref 0.5–2.2)
LYMPHOCYTES # BLD AUTO: 2 K/UL (ref 1–4.8)
LYMPHOCYTES NFR BLD: 18.5 % (ref 18–48)
MCH RBC QN AUTO: 29.9 PG (ref 27–31)
MCHC RBC AUTO-ENTMCNC: 32 G/DL (ref 32–36)
MCV RBC AUTO: 93 FL (ref 82–98)
MONOCYTES # BLD AUTO: 0.7 K/UL (ref 0.3–1)
MONOCYTES NFR BLD: 6.6 % (ref 4–15)
NEUTROPHILS # BLD AUTO: 7.4 K/UL (ref 1.8–7.7)
NEUTROPHILS NFR BLD: 70 % (ref 38–73)
NRBC BLD-RTO: 0 /100 WBC
PLATELET # BLD AUTO: 235 K/UL (ref 150–450)
PMV BLD AUTO: 9.3 FL (ref 9.2–12.9)
POCT GLUCOSE: 115 MG/DL (ref 70–110)
POCT GLUCOSE: 47 MG/DL (ref 70–110)
POCT GLUCOSE: 64 MG/DL (ref 70–110)
POCT GLUCOSE: 86 MG/DL (ref 70–110)
POTASSIUM SERPL-SCNC: 3.5 MMOL/L (ref 3.5–5.1)
RBC # BLD AUTO: 3.75 M/UL (ref 4–5.4)
SODIUM SERPL-SCNC: 138 MMOL/L (ref 136–145)
WBC # BLD AUTO: 10.59 K/UL (ref 3.9–12.7)

## 2023-10-02 PROCEDURE — 99223 1ST HOSP IP/OBS HIGH 75: CPT | Mod: ,,, | Performed by: COLON & RECTAL SURGERY

## 2023-10-02 PROCEDURE — 80048 BASIC METABOLIC PNL TOTAL CA: CPT | Performed by: FAMILY MEDICINE

## 2023-10-02 PROCEDURE — 63600175 PHARM REV CODE 636 W HCPCS: Performed by: NURSE PRACTITIONER

## 2023-10-02 PROCEDURE — 36415 COLL VENOUS BLD VENIPUNCTURE: CPT | Performed by: NURSE PRACTITIONER

## 2023-10-02 PROCEDURE — 36415 COLL VENOUS BLD VENIPUNCTURE: CPT | Performed by: FAMILY MEDICINE

## 2023-10-02 PROCEDURE — 85025 COMPLETE CBC W/AUTO DIFF WBC: CPT | Performed by: FAMILY MEDICINE

## 2023-10-02 PROCEDURE — 11000001 HC ACUTE MED/SURG PRIVATE ROOM

## 2023-10-02 PROCEDURE — 83605 ASSAY OF LACTIC ACID: CPT | Performed by: NURSE PRACTITIONER

## 2023-10-02 PROCEDURE — 21400001 HC TELEMETRY ROOM

## 2023-10-02 PROCEDURE — 25000003 PHARM REV CODE 250: Performed by: NURSE PRACTITIONER

## 2023-10-02 PROCEDURE — 99223 PR INITIAL HOSPITAL CARE,LEVL III: ICD-10-PCS | Mod: ,,, | Performed by: COLON & RECTAL SURGERY

## 2023-10-02 RX ORDER — MAG HYDROX/ALUMINUM HYD/SIMETH 200-200-20
30 SUSPENSION, ORAL (FINAL DOSE FORM) ORAL
Status: DISCONTINUED | OUTPATIENT
Start: 2023-10-02 | End: 2023-10-06 | Stop reason: HOSPADM

## 2023-10-02 RX ORDER — PREGABALIN 75 MG/1
75 CAPSULE ORAL 2 TIMES DAILY
Status: DISCONTINUED | OUTPATIENT
Start: 2023-10-02 | End: 2023-10-06 | Stop reason: HOSPADM

## 2023-10-02 RX ORDER — HYDRALAZINE HYDROCHLORIDE 20 MG/ML
10 INJECTION INTRAMUSCULAR; INTRAVENOUS EVERY 8 HOURS PRN
Status: DISCONTINUED | OUTPATIENT
Start: 2023-10-02 | End: 2023-10-06 | Stop reason: HOSPADM

## 2023-10-02 RX ORDER — DAPAGLIFLOZIN 5 MG/1
5 TABLET, FILM COATED ORAL EVERY MORNING
COMMUNITY
Start: 2023-08-08

## 2023-10-02 RX ORDER — TIRZEPATIDE 5 MG/.5ML
5 INJECTION, SOLUTION SUBCUTANEOUS WEEKLY
COMMUNITY
Start: 2023-09-20

## 2023-10-02 RX ORDER — SUCRALFATE 1 G/10ML
1 SUSPENSION ORAL EVERY 6 HOURS
Status: DISCONTINUED | OUTPATIENT
Start: 2023-10-02 | End: 2023-10-06 | Stop reason: HOSPADM

## 2023-10-02 RX ORDER — NAPROXEN SODIUM 220 MG/1
81 TABLET, FILM COATED ORAL
COMMUNITY
Start: 2023-09-06 | End: 2023-10-06

## 2023-10-02 RX ORDER — ONDANSETRON 4 MG/1
TABLET, ORALLY DISINTEGRATING ORAL
COMMUNITY
End: 2023-11-17 | Stop reason: SDUPTHER

## 2023-10-02 RX ADMIN — ALUMINA, MAGNESIA, AND SIMETHICONE ORAL SUSPENSION REGULAR STRENGTH 30 ML: 1200; 1200; 120 SUSPENSION ORAL at 11:10

## 2023-10-02 RX ADMIN — SODIUM CHLORIDE, POTASSIUM CHLORIDE, SODIUM LACTATE AND CALCIUM CHLORIDE: 600; 310; 30; 20 INJECTION, SOLUTION INTRAVENOUS at 09:10

## 2023-10-02 RX ADMIN — CEFEPIME 2 G: 2 INJECTION, POWDER, FOR SOLUTION INTRAVENOUS at 09:10

## 2023-10-02 RX ADMIN — DEXTROSE MONOHYDRATE 125 ML: 100 INJECTION, SOLUTION INTRAVENOUS at 05:10

## 2023-10-02 RX ADMIN — SODIUM CHLORIDE, POTASSIUM CHLORIDE, SODIUM LACTATE AND CALCIUM CHLORIDE: 600; 310; 30; 20 INJECTION, SOLUTION INTRAVENOUS at 01:10

## 2023-10-02 RX ADMIN — SUCRALFATE 1 G: 1 SUSPENSION ORAL at 05:10

## 2023-10-02 RX ADMIN — DEXTROSE MONOHYDRATE 250 ML: 100 INJECTION, SOLUTION INTRAVENOUS at 10:10

## 2023-10-02 RX ADMIN — ALUMINA, MAGNESIA, AND SIMETHICONE ORAL SUSPENSION REGULAR STRENGTH 30 ML: 1200; 1200; 120 SUSPENSION ORAL at 04:10

## 2023-10-02 RX ADMIN — ALUMINA, MAGNESIA, AND SIMETHICONE ORAL SUSPENSION REGULAR STRENGTH 30 ML: 1200; 1200; 120 SUSPENSION ORAL at 05:10

## 2023-10-02 RX ADMIN — PREGABALIN 75 MG: 75 CAPSULE ORAL at 09:10

## 2023-10-02 RX ADMIN — SUCRALFATE 1 G: 1 SUSPENSION ORAL at 11:10

## 2023-10-02 RX ADMIN — ALUMINA, MAGNESIA, AND SIMETHICONE ORAL SUSPENSION REGULAR STRENGTH 30 ML: 1200; 1200; 120 SUSPENSION ORAL at 09:10

## 2023-10-02 NOTE — HOSPITAL COURSE
10/2: pt reports having bowel movement overnight. Complains of nausea however no vomiting. Will repeat KUB  10/3 She denies nay new complains . She is toelrating liquid diet . Surgery rec no surgical intervention at this time and cot medical tx   10/4 She is tolerating full liquid diet . No BM since admission .  The Blood sugar has been borderline low normal  , IVF changed to  D% 1/2 NS.   10/5 The CBG has been stable , plan to d/c IVF d5 1/2 NS. She is tolerating  full diet . No BM yet but is passing gas . Laxative restarted .   10/6 Pt was seen and examined at bedside . She was determined to be suitable for d/c   She is tolerating a full diet w/o any problem . She had multiple BM yesterday .  The K is low and will be replace with 20 meq po and 40 meq IV  before d/c . Pt request and ambulatory GI referral due to constipation . She will be d/c on po metronidazole and Cipro  for 7 more days .

## 2023-10-02 NOTE — SUBJECTIVE & OBJECTIVE
Past Medical History:   Diagnosis Date    Chronic back pain     Diabetes mellitus, type 2     Hypertension        Past Surgical History:   Procedure Laterality Date    ANKLE SURGERY      right    BACK SURGERY      HYSTERECTOMY      TONSILLECTOMY      TOTAL HIP ARTHROPLASTY Left     TUBAL LIGATION         Review of patient's allergies indicates:  No Known Allergies    No current facility-administered medications on file prior to encounter.     Current Outpatient Medications on File Prior to Encounter   Medication Sig    aspirin 81 MG Chew Take 81 mg by mouth.    cetirizine (ZYRTEC) 10 MG tablet Take 10 mg by mouth once daily.    FARXIGA 5 mg Tab tablet Take 5 mg by mouth every morning.    hydroCHLOROthiazide (HYDRODIURIL) 25 MG tablet Take 1 tablet (25 mg total) by mouth once daily.    hyoscyamine (ANASPAZ,LEVSIN) 0.125 mg Tab Take 125 mcg by mouth 4 (four) times daily.    linaCLOtide (LINZESS) 72 mcg Cap capsule Take 72 mcg by mouth before breakfast.    lisinopriL (PRINIVIL,ZESTRIL) 5 MG tablet Take 5 mg by mouth once daily.    metFORMIN (GLUCOPHAGE) 500 MG tablet Take with meals. Take 1 qam for 1 wk, then bid for 1 wk, then 2 am/1 pm for 1 wk, then 2 bid till finished.    metoclopramide HCl (REGLAN) 10 MG tablet Take 1 tablet (10 mg total) by mouth every 6 (six) hours.    MOUNJARO 5 mg/0.5 mL PnIj Inject 5 mg into the skin once a week.    ondansetron (ZOFRAN-ODT) 4 MG TbDL     oxyCODONE-acetaminophen (PERCOCET)  mg per tablet Take 1 tablet by mouth.    pantoprazole (PROTONIX) 40 MG tablet Take 40 mg by mouth once daily.    pregabalin (LYRICA) 75 MG capsule Take 75 mg by mouth 2 (two) times daily.    rosuvastatin (CRESTOR) 20 MG tablet Take 20 mg by mouth nightly.    fluticasone propionate (FLONASE) 50 mcg/actuation nasal spray fluticasone propionate 50 mcg/actuation nasal spray,suspension   Spray 2 sprays every day by intranasal route.     Family History    None       Tobacco Use    Smoking status: Never     Smokeless tobacco: Never   Substance and Sexual Activity    Alcohol use: No    Drug use: No    Sexual activity: Not on file     Review of Systems   Gastrointestinal:  Positive for abdominal pain, diarrhea, nausea and vomiting. Negative for blood in stool.   Neurological:  Positive for dizziness, syncope, weakness and light-headedness.   All other systems reviewed and are negative.    Objective:     Vital Signs (Most Recent):  Temp: 98 °F (36.7 °C) (10/02/23 0059)  Pulse: 70 (10/02/23 0059)  Resp: 14 (10/02/23 0059)  BP: (!) 108/54 (10/02/23 0059)  SpO2: 100 % (10/02/23 0059) Vital Signs (24h Range):  Temp:  [98 °F (36.7 °C)-98.2 °F (36.8 °C)] 98 °F (36.7 °C)  Pulse:  [] 70  Resp:  [10-20] 14  SpO2:  [90 %-100 %] 100 %  BP: ()/(27-79) 108/54     Weight: 125 kg (275 lb 9.2 oz)  Body mass index is 45.86 kg/m².     Physical Exam  Vitals and nursing note reviewed.   Constitutional:       General: She is awake. She is not in acute distress.     Appearance: Normal appearance. She is well-developed and well-groomed. She is not ill-appearing, toxic-appearing or diaphoretic.   HENT:      Head: Normocephalic and atraumatic.   Eyes:      Extraocular Movements: Extraocular movements intact.      Conjunctiva/sclera: Conjunctivae normal.   Cardiovascular:      Rate and Rhythm: Normal rate and regular rhythm.      Heart sounds: Normal heart sounds. No murmur heard.  Pulmonary:      Effort: Pulmonary effort is normal.      Breath sounds: Normal breath sounds.   Abdominal:      General: Bowel sounds are normal.      Palpations: Abdomen is soft.      Tenderness: There is abdominal tenderness. There is no right CVA tenderness, left CVA tenderness, guarding or rebound.   Musculoskeletal:      Cervical back: Normal range of motion and neck supple.      Comments: 5/5 strength throughout.  Well-healing surgical scar noted to left knee.  No surrounding erythema or edema noted.   Skin:     General: Skin is warm and dry.       Capillary Refill: Capillary refill takes less than 2 seconds.   Neurological:      General: No focal deficit present.      Mental Status: She is alert and oriented to person, place, and time. Mental status is at baseline.      GCS: GCS eye subscore is 4. GCS verbal subscore is 5. GCS motor subscore is 6.      Cranial Nerves: Cranial nerves 2-12 are intact.      Sensory: Sensation is intact.      Motor: Motor function is intact.   Psychiatric:         Mood and Affect: Mood normal.         Speech: Speech normal.         Behavior: Behavior normal. Behavior is cooperative.              LABS:  Recent Results (from the past 24 hour(s))   CBC auto differential    Collection Time: 10/01/23  6:03 PM   Result Value Ref Range    WBC 16.77 (H) 3.90 - 12.70 K/uL    RBC 5.47 (H) 4.00 - 5.40 M/uL    Hemoglobin 16.1 (H) 12.0 - 16.0 g/dL    Hematocrit 51.1 (H) 37.0 - 48.5 %    MCV 93 82 - 98 fL    MCH 29.4 27.0 - 31.0 pg    MCHC 31.5 (L) 32.0 - 36.0 g/dL    RDW 13.8 11.5 - 14.5 %    Platelets 320 150 - 450 K/uL    MPV 10.2 9.2 - 12.9 fL    Immature Granulocytes 1.3 (H) 0.0 - 0.5 %    Gran # (ANC) 14.9 (H) 1.8 - 7.7 K/uL    Immature Grans (Abs) 0.22 (H) 0.00 - 0.04 K/uL    Lymph # 1.1 1.0 - 4.8 K/uL    Mono # 0.4 0.3 - 1.0 K/uL    Eos # 0.1 0.0 - 0.5 K/uL    Baso # 0.06 0.00 - 0.20 K/uL    nRBC 0 0 /100 WBC    Gran % 89.1 (H) 38.0 - 73.0 %    Lymph % 6.6 (L) 18.0 - 48.0 %    Mono % 2.1 (L) 4.0 - 15.0 %    Eosinophil % 0.5 0.0 - 8.0 %    Basophil % 0.4 0.0 - 1.9 %    Platelet Estimate Clumped (A)     Differential Method Automated    Comprehensive metabolic panel    Collection Time: 10/01/23  6:03 PM   Result Value Ref Range    Sodium 141 136 - 145 mmol/L    Potassium 3.5 3.5 - 5.1 mmol/L    Chloride 100 95 - 110 mmol/L    CO2 24 23 - 29 mmol/L    Glucose 212 (H) 70 - 110 mg/dL    BUN 19 8 - 23 mg/dL    Creatinine 2.5 (H) 0.5 - 1.4 mg/dL    Calcium 9.1 8.7 - 10.5 mg/dL    Total Protein 7.3 6.0 - 8.4 g/dL    Albumin 3.4 (L) 3.5 - 5.2  g/dL    Total Bilirubin 1.1 (H) 0.1 - 1.0 mg/dL    Alkaline Phosphatase 108 55 - 135 U/L    AST 10 10 - 40 U/L    ALT 5 (L) 10 - 44 U/L    eGFR 21 (A) >60 mL/min/1.73 m^2    Anion Gap 17 (H) 8 - 16 mmol/L   Lactic acid, plasma #1    Collection Time: 10/01/23  6:03 PM   Result Value Ref Range    Lactate (Lactic Acid) 3.4 (H) 0.5 - 2.2 mmol/L   Magnesium    Collection Time: 10/01/23  6:03 PM   Result Value Ref Range    Magnesium 1.6 1.6 - 2.6 mg/dL   Phosphorus    Collection Time: 10/01/23  6:03 PM   Result Value Ref Range    Phosphorus 4.6 (H) 2.7 - 4.5 mg/dL   Brain natriuretic peptide    Collection Time: 10/01/23  6:03 PM   Result Value Ref Range    BNP <10 0 - 99 pg/mL   Troponin I    Collection Time: 10/01/23  6:03 PM   Result Value Ref Range    Troponin I <0.006 0.000 - 0.026 ng/mL   Procalcitonin    Collection Time: 10/01/23  6:03 PM   Result Value Ref Range    Procalcitonin 0.24 <0.25 ng/mL   Lipase    Collection Time: 10/01/23  6:03 PM   Result Value Ref Range    Lipase 33 4 - 60 U/L   COVID-19 Rapid Screening    Collection Time: 10/01/23  6:16 PM   Result Value Ref Range    SARS-CoV-2 RNA, Amplification, Qual Negative Negative   Influenza A & B by Molecular    Collection Time: 10/01/23  6:16 PM    Specimen: Nasopharyngeal Swab   Result Value Ref Range    Influenza A, Molecular Negative Negative    Influenza B, Molecular Negative Negative    Flu A & B Source Nasal swab    Urinalysis, Reflex to Urine Culture Urine, Clean Catch    Collection Time: 10/01/23  6:51 PM    Specimen: Urine   Result Value Ref Range    Specimen UA Urine, Clean Catch     Color, UA Orange (A) Yellow, Straw, Breanna    Appearance, UA Cloudy (A) Clear    pH, UA 6.0 5.0 - 8.0    Specific Gravity, UA 1.030 1.005 - 1.030    Protein, UA 3+ (A) Negative    Glucose, UA 1+ (A) Negative    Ketones, UA Negative Negative    Bilirubin (UA) 1+ (A) Negative    Occult Blood UA Trace (A) Negative    Nitrite, UA Negative Negative    Urobilinogen, UA >=8.0  (A) <2.0 EU/dL    Leukocytes, UA Trace (A) Negative   Urinalysis Microscopic    Collection Time: 10/01/23  6:51 PM   Result Value Ref Range    RBC, UA 8 (H) 0 - 4 /hpf    WBC, UA 23 (H) 0 - 5 /hpf    Bacteria Occasional None-Occ /hpf    Hyaline Casts, UA 31 (A) 0-1/lpf /lpf    Granular Casts, UA 18 (A) None /lpf    Amorphous, UA Occasional None-Moderate    Microscopic Comment SEE COMMENT    Lactic acid, plasma #2    Collection Time: 10/01/23 10:12 PM   Result Value Ref Range    Lactate (Lactic Acid) 3.3 (H) 0.5 - 2.2 mmol/L       RADIOLOGY  CT Abdomen Pelvis  Without Contrast    Result Date: 10/1/2023  EXAMINATION: CT ABDOMEN PELVIS WITHOUT CONTRAST CLINICAL HISTORY: Abdominal pain, acute, nonlocalized; TECHNIQUE: Low dose axial images, sagittal and coronal reformations were obtained from the lung bases to the pubic symphysis, COMPARISON: None FINDINGS: Heart: Normal in size as far as seen.  No pericardial effusion as far seen. Lung Bases: Well aerated, without consolidation or pleural fluid. Liver: Hepatomegaly. Gallbladder: Status post cholecystectomy. Bile Ducts: No evidence of dilated ducts. Pancreas: No mass or peripancreatic fat stranding. Spleen: Unremarkable. Adrenals: Unremarkable. Kidneys/ Ureters: Hyperdense nodule involving the inferior pole of the right kidney measuring 14 mm.  Recommend follow-up MRI for further evaluation.  Cystic lesion of the superior pole of the kidney measuring 5 cm. Bladder: No evidence of wall thickening. Reproductive organs: Unremarkable. GI Tract/Mesentery: Prominent small bowel loops in the pelvis measuring up to 33 mm in diameter with mild mucosal thickening suggestive of enteritis and partial small bowel obstruction.  Mild mesenteric edema.  Colonic loops of bowel are decompressed.  Mild mucosal thickening of the transverse and right colon. Peritoneal Space: Mild ascites. Retroperitoneum: No significant adenopathy. Abdominal wall: Right-sided stimulator device in the  buttocks region.  Postsurgical changes of the posterior lumbar subcutaneous fat with surgical scar and linear fluid density. Vasculature: No aneurysm.  Atherosclerotic changes. Bones: No acute fracture.  Spinal hardware identified with posterior fixation at S1 L5 L4 L3-L2.  Left hip prosthesis     Hyperdense nodule involving the inferior pole of the right kidney measuring 14 mm. Recommend follow-up MRI with contrast for further evaluation.  Cystic lesion of the superior pole of the kidney measuring 5 cm. Mild ascites. Prominent small bowel loops in the pelvis measuring up to 33 mm in diameter with mild mucosal thickening suggestive of enteritis and partial small bowel obstruction. Mild associated mesenteric edema. Colonic loops of bowel are decompressed. Mild mucosal thickening of the transverse and right colon.  Correlate clinically for colitis. All CT scans   are performed using dose optimization techniques including the following: automated exposure control; adjustment of the mA and/or kV; use of iterative reconstruction technique.  Dose modulation was employed for ALARA by means of: Automated exposure control; adjustment of the mA and/or kV according to patient size (this includes techniques or standardized protocols for targeted exams where dose is matched to indication/reason for exam; i.e. extremities or head); and/or use of iterative reconstructive technique. Electronically signed by: Judson Pineda Date:    10/01/2023 Time:    19:34    X-Ray Chest AP Portable    Result Date: 10/1/2023  EXAMINATION: XR CHEST AP PORTABLE CLINICAL HISTORY: Sepsis; TECHNIQUE: Single frontal view of the chest was performed. COMPARISON: None FINDINGS: The lungs are clear, with normal appearance of pulmonary vasculature and no pleural effusion or pneumothorax. The cardiac silhouette is normal in size. The hilar and mediastinal contours are unremarkable. Bones are intact.     No acute abnormality. Electronically signed by: Judson  Edwin Date:    10/01/2023 Time:    18:38    CT Lumbar Spine Without Contrast    Result Date: 9/17/2023  INDICATION: Pain over anterior intrathecal pump. Gait disturbance. TECHNIQUE: Non-contrast lumbar spine CT. Automated exposure control was used to reduce radiation dose. COMPARISON: Correlation with lumbar spine CT 7/12/2015 FINDINGS: Limited visualization due to artifact. No definitive acute fracture. Posterior spinal fusion hardware, L2-S1. Scoliosis. Advanced multilevel degenerative vertebral/disc base change. Bilateral sacroiliac joint degenerative change. Right renal cyst, 4.5 cm. Right gluteal pump with attached intraspinal wire/catheter extending into the thoracic region.    1. Advanced degenerative spine change 2. Scoliosis. 3. Posterior spinal fusion hardware, L2-S1. 4. Right gluteal/intrathecal pump. 5. Otherwise as described    X-Ray Knee 1 or 2 View Left    Result Date: 9/5/2023  XR KNEE 1 OR 2 VIEW LEFT CLINICAL INDICATION:  knee pain COMPARISON:None. FINDINGS/IMPRESSION: 2 views of the left knee were obtained. Postoperative changes related to left total knee arthroplasty. Femoral and tibial components are intact with normal articulation. No evidence of hardware failure. Subcutaneous air present in the prepatellar region. Intra-articular air-fluid level seen in the suprapatellar recess. Skin staples in the midline of the left knee.      EKG    MICROBIOLOGY    MDM     Amount and/or Complexity of Data Reviewed  Clinical lab tests: reviewed  Tests in the radiology section of CPT®: reviewed  Tests in the medicine section of CPT®: reviewed  Discussion of test results with the performing providers: yes  Decide to obtain previous medical records or to obtain history from someone other than the patient: yes  Obtain history from someone other than the patient: yes  Review and summarize past medical records: yes  Discuss the patient with other providers: yes  Independent visualization of images, tracings, or  specimens: yes

## 2023-10-02 NOTE — ASSESSMENT & PLAN NOTE
Patient is chronically on statin.will not continue for now. Last Lipid Panel:   Lab Results   Component Value Date    CHOL 119 03/29/2023    HDL 48 03/29/2023    LDLCALC 51 03/29/2023    TRIG 110 03/29/2023    CHOLHDL 31.3 12/12/2012     Plan:  -Continue home medication  -low fat/low calorie diet

## 2023-10-02 NOTE — PLAN OF CARE
[facility] - Med Surg  Initial Discharge Assessment       Primary Care Provider: Jed Munoz MD    Admission Diagnosis: Knee pain, right [M25.561]  Partial small bowel obstruction [K56.600]  Chest pain [R07.9]  Hypotension [I95.9]  Opioid overdose, accidental or unintentional, initial encounter [T40.2X1A]  Urinary tract infection without hematuria, site unspecified [N39.0]    Admission Date: 10/1/2023  Expected Discharge Date: per attending         Payor: BLUE CROSS BLUE SHIELD / [place] [facility] [place] [facility] / Product Type: Commercial /     Extended Emergency Contact Information  Primary Emergency Contact: Zackary Conde  Address: [address] [place] [address] .           [place] [address] [facility]  Home Phone: [phone]  Relation: Spouse  Secondary Emergency Contact: brenda joy  Mobile Phone: [phone]  Relation: Daughter   needed? No    Discharge Plan A: Home with family         [facility] [address] [place] [address]  [address] [address] [address]  Phone: [phone] Fax: [phone]      Initial Assessment (most recent)       Adult Discharge Assessment - 10/02/23 1033          Discharge Assessment    Assessment Type Discharge Planning Assessment     Confirmed/corrected address, phone number and insurance Yes     Confirmed Demographics Correct on Facesheet     Source of Information patient     When was your last doctors appointment? --   Babak Brannon-2 weeks    Communicated ANDRÉS with patient/caregiver Date not available/Unable to determine     Reason For Admission knee pain     People in Home spouse;grandparent(s)     Do you expect to return to your current living situation? Yes     Do you have help at home or someone to help you manage your care at home? Yes     Who are your caregiver(s) and their phone number(s)? spouse     Prior to hospitilization cognitive status: Alert/Oriented     Current cognitive status: Alert/Oriented      Equipment Currently Used at Home none     Readmission within 30 days? No     Patient currently being followed by outpatient case management? No     Do you currently have service(s) that help you manage your care at home? No     Do you take prescription medications? Yes     Do you have prescription coverage? Yes     Coverage bcbs     Do you have any problems affording any of your prescribed medications? No     Is the patient taking medications as prescribed? yes     Who is going to help you get home at discharge? spouse     How do you get to doctors appointments? family or friend will provide     Are you on dialysis? No     Do you take coumadin? No     Discharge Plan A Home with family

## 2023-10-02 NOTE — PROGRESS NOTES
Pharmacokinetic Assessment Sign Off: IV Vancomycin     Therapy with Vancomycin complete and/or consult discontinued by provider.  Pharmacy will sign off, please re-consult as needed.     Thank you for allowing us to participate in this patient's care.      Julita Roland PharmD 10/02/2023 12:03 PM

## 2023-10-02 NOTE — PROGRESS NOTES
Pharmacist Renal Dose Adjustment Note    Roslyn Conde is a 63 y.o. female being treated with the medication cefepime    Patient Data:    Vital Signs (Most Recent):  Temp: 98 °F (36.7 °C) (10/02/23 0059)  Pulse: 70 (10/02/23 0059)  Resp: 14 (10/02/23 0059)  BP: (!) 108/54 (10/02/23 0059)  SpO2: 100 % (10/02/23 0059) Vital Signs (72h Range):  Temp:  [98 °F (36.7 °C)-98.2 °F (36.8 °C)]   Pulse:  []   Resp:  [10-20]   BP: ()/(27-79)   SpO2:  [90 %-100 %]      Recent Labs   Lab 10/01/23  1803   CREATININE 2.5*     Serum creatinine: 2.5 mg/dL (H) 10/01/23 1803  Estimated creatinine clearance: 30.6 mL/min (A)    Cefepime 1 g q12h will be changed to cefepime 2 g q12h for CrCl 30-60 mL/min and severe infection (indication: bone/joint).     Pharmacist's Name: Ginger Torres  Pharmacist's Extension: [phone]

## 2023-10-02 NOTE — ASSESSMENT & PLAN NOTE
Currently normotensive. BP usually well controlled per patient with home medications.  Plan:  -Optimize pain control   -Hold home medications (lisinopril, hctz) due to DALIA, titrate as needed   -Monitor BP  -Low salt/cardiac diet when not NPO  -IV hydralazine prn for SBP>160 or DBP>90

## 2023-10-02 NOTE — HPI
Roslyn Conde is a 63 y.o. female with a PMH  has a past medical history of Chronic back pain, Diabetes mellitus, type 2, and Hypertension.  Presented to the ER for evaluation of dizziness with syncopal episode earlier today.  Patient reports she was on the commode when she had a large bowel movement.  Patient states that she was dizzy and felt like she was going to pass out while on the toilet.  Patient stated when she stood up to walk she had loss of consciousness.  Associated symptoms include generalized abdominal pain generalized weakness/fatigue and 1 episode of nonbloody vomitus and sweating.  Patient states that she was sitting pain management for back and knee pain.  Patient states her pain management doctor recently increased her Percocet to 4 times daily urine due to having persistent pain of her her left knee secondary to recent TKA.  Patient received Narcan with improvement of symptoms.  Patient states she takes her medication as prescribed.  Denies previous issues with pain medication.  States her generalized abdominal pain at 0/10.  Denies any other complaints at this time.    ER workup revealed leukocytosis of 16.77, BUN/creatinine of 2.5/21, CBG to 12 mg/dL, lactic acid of 3.4 with repeat level of 3.3 after IVF.  UA positive for cystitis.  All remaining blood work unremarkable.  Patient received 2 g of vancomycin, 1 g of cefepime, 500 cc bolus of LR, sepsis bolus of LR at 2, 538 cc, and 3 doses of 0.4 mg of Narcan.  EKG revealed sinus tachycardia with a ventricular rate of 115 beats per minute with a QT/QTC of 456/630.  CT abdomen and pelvis revealed:[ Prominent small bowel loops in the pelvis measuring up to 33 mm in diameter with mild mucosal thickening suggestive of enteritis and partial small bowel obstruction. Mild associated mesenteric edema. Colonic loops of bowel are decompressed. Mild mucosal thickening of the transverse and right colon.  Correlate clinically for colitis]. Hospital  Medicine consulted to admit patient for small-bowel obstruction.  Patient family at bedside are in agreement with treatment plan.  Patient will be admitted under inpatient status.    PCP: Jed Munoz

## 2023-10-02 NOTE — ASSESSMENT & PLAN NOTE
Urinalysis revealed:   Lab Results   Component Value Date    COLORU Orange (A) 10/01/2023    APPEARANCEUA Cloudy (A) 10/01/2023    SPECGRAV 1.030 10/01/2023    PHUR 6.0 10/01/2023    PROTEINUA 3+ (A) 10/01/2023    GLUCUA 1+ (A) 10/01/2023    KETONESU Negative 10/01/2023    NITRITE Negative 10/01/2023    UROBILINOGEN >=8.0 (A) 10/01/2023    BILIRUBINUA 1+ (A) 10/01/2023    LEUKOCYTESUR Trace (A) 10/01/2023    RBCUA 8 (H) 10/01/2023    WBCUA 23 (H) 10/01/2023    BACTERIA Occasional 10/01/2023    HYALINECASTS 31 (A) 10/01/2023   Received vanco and cefepime  Plan:  -UA culture pending  -Continue Abx

## 2023-10-02 NOTE — ASSESSMENT & PLAN NOTE
Patient with acute kidney injury likely d/t IVVD/Dehydration. DALIA is currently being treated. Labs reviewed- Renal function/electrolytes with Estimated Creatinine Clearance: 30.6 mL/min (A) (based on SCr of 2.5 mg/dL (H)). according to latest data. Monitor urine output and serial BMP and adjust therapy as needed. Avoid nephrotoxins and renally dose meds for GFR listed above.

## 2023-10-02 NOTE — PROGRESS NOTES
[facility] Medicine  Progress Note    Patient Name: Roslyn Conde  MRN: [id number]  Patient Class: IP- Inpatient   Admission Date: 10/1/2023  Length of Stay: 1 days  Attending Physician: Bruce Ortiz MD  Primary Care Provider: Jed Munoz MD        Subjective:     Principal Problem:Small bowel obstruction        HPI:  Roslyn Conde is a 63 y.o. female with a PMH  has a past medical history of Chronic back pain, Diabetes mellitus, type 2, and Hypertension.  Presented to the ER for evaluation of dizziness with syncopal episode earlier today.  Patient reports she was on the commode when she had a large bowel movement.  Patient states that she was dizzy and felt like she was going to pass out while on the toilet.  Patient stated when she stood up to walk she had loss of consciousness.  Associated symptoms include generalized abdominal pain generalized weakness/fatigue and 1 episode of nonbloody vomitus and sweating.  Patient states that she was sitting pain management for back and knee pain.  Patient states her pain management doctor recently increased her Percocet to 4 times daily urine due to having persistent pain of her her left knee secondary to recent TKA.  Patient received Narcan with improvement of symptoms.  Patient states she takes her medication as prescribed.  Denies previous issues with pain medication.  States her generalized abdominal pain at 0/10.  Denies any other complaints at this time.    ER workup revealed leukocytosis of 16.77, BUN/creatinine of 2.5/21, CBG to 12 mg/dL, lactic acid of 3.4 with repeat level of 3.3 after IVF.  UA positive for cystitis.  All remaining blood work unremarkable.  Patient received 2 g of vancomycin, 1 g of cefepime, 500 cc bolus of LR, sepsis bolus of LR at 2, 538 cc, and 3 doses of 0.4 mg of Narcan.  EKG revealed sinus tachycardia with a ventricular rate of 115 beats per minute with a QT/QTC of 456/630.  CT abdomen and pelvis revealed:[ Prominent  small bowel loops in the pelvis measuring up to 33 mm in diameter with mild mucosal thickening suggestive of enteritis and partial small bowel obstruction. Mild associated mesenteric edema. Colonic loops of bowel are decompressed. Mild mucosal thickening of the transverse and right colon.  Correlate clinically for colitis]. Hospital Medicine consulted to admit patient for small-bowel obstruction.  Patient family at bedside are in agreement with treatment plan.  Patient will be admitted under inpatient status.    PCP: Jed Munoz      Overview/Hospital Course:  10/2: pt reports having bowel movement overnight. Complains of nausea however no vomiting. Will repeat KUB      Interval History: No issues overnight. Patient reports having bowel movement last night. States she has a long hx of constipation.     Review of Systems   Constitutional:  Positive for fatigue. Negative for fever.   HENT:  Negative for sinus pressure.    Eyes:  Negative for visual disturbance.   Respiratory:  Negative for shortness of breath.    Cardiovascular:  Negative for chest pain.   Gastrointestinal:  Positive for constipation. Negative for nausea and vomiting.   Genitourinary:  Negative for difficulty urinating.   Musculoskeletal:  Negative for back pain.   Skin:  Negative for rash.   Neurological:  Negative for headaches.   Psychiatric/Behavioral:  Negative for confusion.      Objective:     Vital Signs (Most Recent):  Temp: 97.9 °F (36.6 °C) (10/02/23 1147)  Pulse: 69 (10/02/23 1147)  Resp: 18 (10/02/23 1147)  BP: (!) 97/50 (10/02/23 1147)  SpO2: (!) 92 % (10/02/23 1147) Vital Signs (24h Range):  Temp:  [97.9 °F (36.6 °C)-98.7 °F (37.1 °C)] 97.9 °F (36.6 °C)  Pulse:  [] 69  Resp:  [10-20] 18  SpO2:  [90 %-100 %] 92 %  BP: ()/(27-79) 97/50     Weight: 125 kg (275 lb 9.2 oz)  Body mass index is 45.86 kg/m².    Intake/Output Summary (Last 24 hours) at 10/2/2023 1154  Last data filed at 10/2/2023 0606  Gross per 24 hour    Intake 4138 ml   Output --   Net 4138 ml         Physical Exam  Constitutional:       General: She is not in acute distress.     Appearance: She is well-developed. She is obese. She is not diaphoretic.   HENT:      Head: Normocephalic and atraumatic.   Eyes:      Pupils: Pupils are equal, round, and reactive to light.   Cardiovascular:      Rate and Rhythm: Normal rate and regular rhythm.      Heart sounds: Normal heart sounds. No murmur heard.     No friction rub. No gallop.   Pulmonary:      Effort: Pulmonary effort is normal. No respiratory distress.      Breath sounds: Normal breath sounds. No stridor. No wheezing or rales.   Abdominal:      General: Bowel sounds are normal. There is no distension.      Palpations: Abdomen is soft. There is no mass.      Tenderness: There is no abdominal tenderness. There is no guarding or rebound.      Hernia: No hernia is present.   Musculoskeletal:      Right lower leg: No edema.      Left lower leg: No edema.   Skin:     General: Skin is warm.      Findings: No erythema.   Neurological:      Mental Status: She is alert and oriented to person, place, and time.             Significant Labs: All pertinent labs within the past 24 hours have been reviewed.    Significant Imaging: I have reviewed all pertinent imaging results/findings within the past 24 hours.        Assessment/Plan:      * Small bowel obstruction  Leukocytosis of 16.77 with elevated lactic acid level of 3.4 with revealed 3.3.  Plan:  -npo  -ivfs  -analgesics prn  -antiemetics prn  -gensurg consult in am    10/2:  Partial SBO on CT scan  Pt reported having bowel movement overnight  No nausea and vomiting currently  No NG tube in place  Will plan to place NG tube should pt become symptomatic   Surgery consulted on case  Cont NPO  IVF       Accidental overdose  Received 3 doses of 0.4 mg Narcan with response.  Excessive sedation likely a result from taking pain medication in setting of IVVD. Currently voices no  complaints at this time.   Plan:  -hold narcotics   -tele monitoring  -monitor vitals  -supplemental O2 as needed  -additional narcan if needed    Chronic low back pain  Compliant with home meds.  Plan:  -continue lyrica  -hold narcotics due to accidental overdose      Acute cystitis with hematuria  Urinalysis revealed:   Lab Results   Component Value Date    COLORU Orange (A) 10/01/2023    APPEARANCEUA Cloudy (A) 10/01/2023    SPECGRAV 1.030 10/01/2023    PHUR 6.0 10/01/2023    PROTEINUA 3+ (A) 10/01/2023    GLUCUA 1+ (A) 10/01/2023    KETONESU Negative 10/01/2023    NITRITE Negative 10/01/2023    UROBILINOGEN >=8.0 (A) 10/01/2023    BILIRUBINUA 1+ (A) 10/01/2023    LEUKOCYTESUR Trace (A) 10/01/2023    RBCUA 8 (H) 10/01/2023    WBCUA 23 (H) 10/01/2023    BACTERIA Occasional 10/01/2023    HYALINECASTS 31 (A) 10/01/2023   Received vanco and cefepime  Plan:  -UA culture pending  -Continue Abx    10/2:  Cont cefepime      DALIA (acute kidney injury)  Patient with acute kidney injury likely d/t IVVD/Dehydration. DALIA is currently being treated. Labs reviewed- Renal function/electrolytes with Estimated Creatinine Clearance: 31.9 mL/min (A) (based on SCr of 2.4 mg/dL (H)). according to latest data. Monitor urine output and serial BMP and adjust therapy as needed. Avoid nephrotoxins and renally dose meds for GFR listed above.     Cont IVF    Elevated lactic acid level  See above      Leukocytosis  Possibly reactive  Will cont IVF  Empiric cefepime for now         Mixed hyperlipidemia  Patient is chronically on statin.will not continue for now. Last Lipid Panel:   Lab Results   Component Value Date    CHOL 119 03/29/2023    HDL 48 03/29/2023    LDLCALC 51 03/29/2023    TRIG 110 03/29/2023    CHOLHDL 31.3 12/12/2012     Plan:  -Continue home medication  -low fat/low calorie diet        Type 2 diabetes mellitus with hyperglycemia, without long-term current use of insulin  Most recent A1c measuring   Hemoglobin A1C   Date Value  Ref Range Status   02/23/2020 6.0 <=6.5 % Final   04/11/2013 6.2 4.0 - 6.2 % Final     Plan:  -SSI  -Accu-checks   -Hypoglycemic protocol   -Hold oral antihyperglycemics while inpatient           Essential hypertension  Currently normotensive. BP usually well controlled per patient with home medications.  Plan:  -Optimize pain control   -Hold home medications (lisinopril, hctz) due to DALIA, titrate as needed   -Monitor BP  -Low salt/cardiac diet when not NPO  -IV hydralazine prn for SBP>160 or DBP>90             VTE Risk Mitigation (From admission, onward)         Ordered     Reason for No Pharmacological VTE Prophylaxis  Once        Question:  Reasons:  Answer:  Physician Provided (leave comment)    10/01/23 2342     IP VTE HIGH RISK PATIENT  Once         10/01/23 2342     Place sequential compression device  Until discontinued         10/01/23 2342                Discharge Planning   ANDRÉS:      Code Status: Full Code   Is the patient medically ready for discharge?:     Reason for patient still in hospital (select all that apply): Patient trending condition  Discharge Plan A: Home with family                  Bruce Ortiz MD  Department of Hospital Medicine   [facility] Med Surg

## 2023-10-02 NOTE — ASSESSMENT & PLAN NOTE
Leukocytosis of 16.77 with elevated lactic acid level of 3.4 with revealed 3.3.  Plan:  -npo  -ivfs  -analgesics prn  -antiemetics prn  -gensurg consult in am

## 2023-10-02 NOTE — ASSESSMENT & PLAN NOTE
Received 3 doses of 0.4 mg Narcan with response.  Excessive sedation likely a result from taking pain medication in setting of IVVD. Currently voices no complaints at this time.   Plan:  -hold narcotics   -tele monitoring  -monitor vitals  -supplemental O2 as needed  -additional narcan if needed

## 2023-10-02 NOTE — PLAN OF CARE
Plan of care reviewed and followed with patient understanding of POC verbalized. Patient's call bell in reach and instructed to call for needs. Remains free of falls or injury. Pain controlled with PRN medications. Tolerating medications well. Cardiac monitoring in place. Vital signs stable. NADN. Will continue to monitor.       Problem: Adult Inpatient Plan of Care  Goal: Plan of Care Review  Outcome: Ongoing, Progressing  Goal: Patient-Specific Goal (Individualized)  Outcome: Ongoing, Progressing  Goal: Absence of Hospital-Acquired Illness or Injury  Outcome: Ongoing, Progressing  Goal: Optimal Comfort and Wellbeing  Outcome: Ongoing, Progressing  Goal: Readiness for Transition of Care  Outcome: Ongoing, Progressing     Problem: Bariatric Environmental Safety  Goal: Safety Maintained with Care  Outcome: Ongoing, Progressing     Problem: Diabetes Comorbidity  Goal: Blood Glucose Level Within Targeted Range  Outcome: Ongoing, Progressing     Problem: Fluid and Electrolyte Imbalance (Acute Kidney Injury/Impairment)  Goal: Fluid and Electrolyte Balance  Outcome: Ongoing, Progressing     Problem: Oral Intake Inadequate (Acute Kidney Injury/Impairment)  Goal: Optimal Nutrition Intake  Outcome: Ongoing, Progressing     Problem: Renal Function Impairment (Acute Kidney Injury/Impairment)  Goal: Effective Renal Function  Outcome: Ongoing, Progressing

## 2023-10-02 NOTE — ASSESSMENT & PLAN NOTE
Most recent A1c measuring   Hemoglobin A1C   Date Value Ref Range Status   02/23/2020 6.0 <=6.5 % Final   04/11/2013 6.2 4.0 - 6.2 % Final     Plan:  -SSI  -Accu-checks   -Hypoglycemic protocol   -Hold oral antihyperglycemics while inpatient

## 2023-10-02 NOTE — SUBJECTIVE & OBJECTIVE
Interval History: No issues overnight. Patient reports having bowel movement last night. States she has a long hx of constipation.     Review of Systems   Constitutional:  Positive for fatigue. Negative for fever.   HENT:  Negative for sinus pressure.    Eyes:  Negative for visual disturbance.   Respiratory:  Negative for shortness of breath.    Cardiovascular:  Negative for chest pain.   Gastrointestinal:  Positive for constipation. Negative for nausea and vomiting.   Genitourinary:  Negative for difficulty urinating.   Musculoskeletal:  Negative for back pain.   Skin:  Negative for rash.   Neurological:  Negative for headaches.   Psychiatric/Behavioral:  Negative for confusion.      Objective:     Vital Signs (Most Recent):  Temp: 97.9 °F (36.6 °C) (10/02/23 1147)  Pulse: 69 (10/02/23 1147)  Resp: 18 (10/02/23 1147)  BP: (!) 97/50 (10/02/23 1147)  SpO2: (!) 92 % (10/02/23 1147) Vital Signs (24h Range):  Temp:  [97.9 °F (36.6 °C)-98.7 °F (37.1 °C)] 97.9 °F (36.6 °C)  Pulse:  [] 69  Resp:  [10-20] 18  SpO2:  [90 %-100 %] 92 %  BP: ()/(27-79) 97/50     Weight: 125 kg (275 lb 9.2 oz)  Body mass index is 45.86 kg/m².    Intake/Output Summary (Last 24 hours) at 10/2/2023 1154  Last data filed at 10/2/2023 0606  Gross per 24 hour   Intake 4138 ml   Output --   Net 4138 ml         Physical Exam  Constitutional:       General: She is not in acute distress.     Appearance: She is well-developed. She is obese. She is not diaphoretic.   HENT:      Head: Normocephalic and atraumatic.   Eyes:      Pupils: Pupils are equal, round, and reactive to light.   Cardiovascular:      Rate and Rhythm: Normal rate and regular rhythm.      Heart sounds: Normal heart sounds. No murmur heard.     No friction rub. No gallop.   Pulmonary:      Effort: Pulmonary effort is normal. No respiratory distress.      Breath sounds: Normal breath sounds. No stridor. No wheezing or rales.   Abdominal:      General: Bowel sounds are normal.  There is no distension.      Palpations: Abdomen is soft. There is no mass.      Tenderness: There is no abdominal tenderness. There is no guarding or rebound.      Hernia: No hernia is present.   Musculoskeletal:      Right lower leg: No edema.      Left lower leg: No edema.   Skin:     General: Skin is warm.      Findings: No erythema.   Neurological:      Mental Status: She is alert and oriented to person, place, and time.             Significant Labs: All pertinent labs within the past 24 hours have been reviewed.    Significant Imaging: I have reviewed all pertinent imaging results/findings within the past 24 hours.

## 2023-10-02 NOTE — PROGRESS NOTES
"Pharmacokinetic Initial Assessment: IV Vancomycin    Assessment/Plan:    Initiate intravenous vancomycin with loading dose of 2000 mg once with subsequent doses when random concentrations are less than 20 mcg/mL  Desired empiric serum trough concentration is 10 to 20 mcg/mL  Draw vancomycin random level on 10/02 at 1430.  Pharmacy will continue to follow and monitor vancomycin.      Please contact pharmacy at extension [phone] with any questions regarding this assessment.     Thank you for the consult,   Ginger Torres       Patient brief summary:  Roslyn Conde is a 63 y.o. female initiated on antimicrobial therapy with IV Vancomycin for treatment of suspected skin & soft tissue infection    Drug Allergies:   Review of patient's allergies indicates:  No Known Allergies    Actual Body Weight:   122.6 kg    Renal Function:   Estimated Creatinine Clearance: 30.8 mL/min (A) (based on SCr of 2.5 mg/dL (H)).,     Dialysis Method (if applicable):  N/A    CBC (last 72 hours):  Recent Labs   Lab Result Units 10/01/23  1803   WBC K/uL 16.77*   Hemoglobin g/dL 16.1*   Hematocrit % 51.1*   Platelets K/uL 320   Gran % % 89.1*   Lymph % % 6.6*   Mono % % 2.1*   Eosinophil % % 0.5   Basophil % % 0.4   Differential Method  Automated       Metabolic Panel (last 72 hours):  Recent Labs   Lab Result Units 10/01/23  1803 10/01/23  1851   Sodium mmol/L 141  --    Potassium mmol/L 3.5  --    Chloride mmol/L 100  --    CO2 mmol/L 24  --    Glucose mg/dL 212*  --    Glucose, UA   --  1+*   BUN mg/dL 19  --    Creatinine mg/dL 2.5*  --    Albumin g/dL 3.4*  --    Total Bilirubin mg/dL 1.1*  --    Alkaline Phosphatase U/L 108  --    AST U/L 10  --    ALT U/L 5*  --    Magnesium mg/dL 1.6  --    Phosphorus mg/dL 4.6*  --        Drug levels (last 3 results):  No results for input(s): "VANCOMYCINRA", "VANCORANDOM", "VANCOMYCINPE", "VANCOPEAK", "VANCOMYCINTR", "VANCOTROUGH" in the last 72 hours.    Microbiologic Results:  Microbiology Results " (last 7 days)       Procedure Component Value Units Date/Time    Urine culture [[id number]] Collected: 10/01/23 1851    Order Status: No result Specimen: Urine Updated: 10/01/23 1919    Influenza A & B by Molecular [[id number]] Collected: 10/01/23 1816    Order Status: Completed Specimen: Nasopharyngeal Swab Updated: 10/01/23 1847     Influenza A, Molecular Negative     Influenza B, Molecular Negative     Flu A & B Source Nasal swab    Blood culture x two cultures. Draw prior to antibiotics. [[id number]] Collected: 10/01/23 1805    Order Status: Sent Specimen: Blood from Peripheral, Hand, Right Updated: 10/01/23 1806    Blood culture x two cultures. Draw prior to antibiotics. [[id number]] Collected: 10/01/23 1750    Order Status: Sent Specimen: Blood from Peripheral, Antecubital, Left Updated: 10/01/23 1804

## 2023-10-02 NOTE — ASSESSMENT & PLAN NOTE
Leukocytosis of 16.77 with elevated lactic acid level of 3.4 with revealed 3.3.  Plan:  -npo  -ivfs  -analgesics prn  -antiemetics prn  -gensurg consult in am    10/2:  Partial SBO on CT scan  Pt reported having bowel movement overnight  No nausea and vomiting currently  No NG tube in place  Will plan to place NG tube should pt become symptomatic   Surgery consulted on case  Cont NPO  IVF

## 2023-10-02 NOTE — MEDICAL/APP STUDENT
Date of Admit: 10/1/2023    HPI:         Chief Complaint   Patient presents with   Constipation and Dizziness (Generalized weakness and constipation per patient. Recent knee surgery. Has been taking pain medication. )    Roslyn Conde is a 63 y.o. female with a PMH of chronic back pain, Diabetes mellitus type 2, HTN, and hyperlipidemia. She presented last night due to dizziness following a syncopal episode. Patient reports she was on the commode when she had a large bowel movement.  Patient states that she was dizzy and felt like she was going to pass out while on the toilet.  Patient stated when she stood up to walk she had loss of consciousness. She reports about one week of constipation. She also reports generalized weakness/fatigue and a single episode of emesis and sweating.       Patient states her pain management doctor recently increased her Percocet to 4 times daily urine due to having persistent pain of her her left knee secondary to recent TKA.  Patient received Narcan with improvement of symptoms.  Patient states she takes her medication as prescribed.  Denies previous issues with pain medication.  States her generalized abdominal pain at 0/10.  Denies any other complaints at this time.    ER workup revealed leukocytosis of 16.77, BUN/creatinine of 2.5/21, CBG to 12 mg/dL, lactic acid of 3.4 with repeat level of 3.3 after IVF.  UA positive for cystitis.  All remaining blood work unremarkable.  Patient received 2 g of vancomycin, 1 g of cefepime, 500 cc bolus of LR, sepsis bolus of LR at 2, 538 cc, and 3 doses of 0.4 mg of Narcan.  EKG revealed sinus tachycardia with a ventricular rate of 115 beats per minute with a QT/QTC of 456/630.  CT abdomen and pelvis revealed:[ Prominent small bowel loops in the pelvis measuring up to 33 mm in diameter with mild mucosal thickening suggestive of enteritis and partial small bowel obstruction. Mild associated mesenteric edema. Colonic loops of bowel are  decompressed. Mild mucosal thickening of the transverse and right colon.  Correlate clinically for colitis]. Hospital Medicine consulted to admit patient for small-bowel obstruction.  Patient family at bedside are in agreement with treatment plan.  Patient will be admitted under inpatient status.     PCP: Jed Munoz      MEDICATIONS & ALLERGIES:     No current facility-administered medications on file prior to encounter.     Current Outpatient Medications on File Prior to Encounter   Medication Sig Dispense Refill    aspirin 81 MG Chew Take 81 mg by mouth.      cetirizine (ZYRTEC) 10 MG tablet Take 10 mg by mouth once daily.      FARXIGA 5 mg Tab tablet Take 5 mg by mouth every morning.      hydroCHLOROthiazide (HYDRODIURIL) 25 MG tablet Take 1 tablet (25 mg total) by mouth once daily. 90 tablet 3    hyoscyamine (ANASPAZ,LEVSIN) 0.125 mg Tab Take 125 mcg by mouth 4 (four) times daily.      linaCLOtide (LINZESS) 72 mcg Cap capsule Take 72 mcg by mouth before breakfast.      lisinopriL (PRINIVIL,ZESTRIL) 5 MG tablet Take 5 mg by mouth once daily.      metFORMIN (GLUCOPHAGE) 500 MG tablet Take with meals. Take 1 qam for 1 wk, then bid for 1 wk, then 2 am/1 pm for 1 wk, then 2 bid till finished. 70 tablet 0    metoclopramide HCl (REGLAN) 10 MG tablet Take 1 tablet (10 mg total) by mouth every 6 (six) hours. 30 tablet 0    MOUNJARO 5 mg/0.5 mL PnIj Inject 5 mg into the skin once a week.      ondansetron (ZOFRAN-ODT) 4 MG TbDL       oxyCODONE-acetaminophen (PERCOCET)  mg per tablet Take 1 tablet by mouth.      pantoprazole (PROTONIX) 40 MG tablet Take 40 mg by mouth once daily.      pregabalin (LYRICA) 75 MG capsule Take 75 mg by mouth 2 (two) times daily.      rosuvastatin (CRESTOR) 20 MG tablet Take 20 mg by mouth nightly.      fluticasone propionate (FLONASE) 50 mcg/actuation nasal spray fluticasone propionate 50 mcg/actuation nasal spray,suspension   Spray 2 sprays every day by intranasal route.           Review of patient's allergies indicates:  No Known Allergies      PAST HISTORY:     Past Medical History:   Diagnosis Date    Chronic back pain     Diabetes mellitus, type 2     Hypertension        Scheduled Meds:    aluminum-magnesium hydroxide-simethicone  30 mL Oral QID (AC & HS)    ceFEPime (MAXIPIME) IVPB  2 g Intravenous Q12H    pregabalin  75 mg Oral BID    sucralfate  1 g Oral Q6H      PRN Meds: acetaminophen, acetaminophen, albuterol-ipratropium, aluminum-magnesium hydroxide-simethicone, dextrose 10%, dextrose 10%, glucagon (human recombinant), glucose, glucose, hydrALAZINE, HYDROcodone-acetaminophen, insulin aspart U-100, melatonin, morphine, naloxone, polyethylene glycol, prochlorperazine, simethicone, sodium chloride 0.9%, Pharmacy to dose Vancomycin consult **AND** vancomycin - pharmacy to dose     Past Surgical History:   Procedure Laterality Date    ANKLE SURGERY      right    BACK SURGERY      HYSTERECTOMY      TONSILLECTOMY      TOTAL HIP ARTHROPLASTY Left     TUBAL LIGATION         History reviewed. No pertinent family history.    Social History     Socioeconomic History    Marital status:    Tobacco Use    Smoking status: Never    Smokeless tobacco: Never   Substance and Sexual Activity    Alcohol use: No    Drug use: No       Review of Systems:  Review of Systems   Constitutional:  Negative for chills and fever.   HENT:  Negative for sore throat.    Respiratory:  Negative for cough and stridor.    Cardiovascular:  Negative for chest pain and palpitations.   Gastrointestinal:  Positive for abdominal pain, constipation, nausea and vomiting.       Exam     Vitals:    10/02/23 0300 10/02/23 0402 10/02/23 0500 10/02/23 0741   BP:  (!) 93/48  (!) 92/49   BP Location:  Left arm  Right arm   Patient Position:  Lying  Lying   Pulse: 76 75 70 67   Resp:  20  16   Temp:  98 °F (36.7 °C)  98.7 °F (37.1 °C)   TempSrc:  Oral  Oral   SpO2:  97%  96%   Weight:       Height:           Recent Results  (from the past 72 hour(s))   CBC auto differential    Collection Time: 10/01/23  6:03 PM   Result Value Ref Range    WBC 16.77 (H) 3.90 - 12.70 K/uL    RBC 5.47 (H) 4.00 - 5.40 M/uL    Hemoglobin 16.1 (H) 12.0 - 16.0 g/dL    Hematocrit 51.1 (H) 37.0 - 48.5 %    MCV 93 82 - 98 fL    MCH 29.4 27.0 - 31.0 pg    MCHC 31.5 (L) 32.0 - 36.0 g/dL    RDW 13.8 11.5 - 14.5 %    Platelets 320 150 - 450 K/uL    MPV 10.2 9.2 - 12.9 fL    Immature Granulocytes 1.3 (H) 0.0 - 0.5 %    Gran # (ANC) 14.9 (H) 1.8 - 7.7 K/uL    Immature Grans (Abs) 0.22 (H) 0.00 - 0.04 K/uL    Lymph # 1.1 1.0 - 4.8 K/uL    Mono # 0.4 0.3 - 1.0 K/uL    Eos # 0.1 0.0 - 0.5 K/uL    Baso # 0.06 0.00 - 0.20 K/uL    nRBC 0 0 /100 WBC    Gran % 89.1 (H) 38.0 - 73.0 %    Lymph % 6.6 (L) 18.0 - 48.0 %    Mono % 2.1 (L) 4.0 - 15.0 %    Eosinophil % 0.5 0.0 - 8.0 %    Basophil % 0.4 0.0 - 1.9 %    Platelet Estimate Clumped (A)     Differential Method Automated    Comprehensive metabolic panel    Collection Time: 10/01/23  6:03 PM   Result Value Ref Range    Sodium 141 136 - 145 mmol/L    Potassium 3.5 3.5 - 5.1 mmol/L    Chloride 100 95 - 110 mmol/L    CO2 24 23 - 29 mmol/L    Glucose 212 (H) 70 - 110 mg/dL    BUN 19 8 - 23 mg/dL    Creatinine 2.5 (H) 0.5 - 1.4 mg/dL    Calcium 9.1 8.7 - 10.5 mg/dL    Total Protein 7.3 6.0 - 8.4 g/dL    Albumin 3.4 (L) 3.5 - 5.2 g/dL    Total Bilirubin 1.1 (H) 0.1 - 1.0 mg/dL    Alkaline Phosphatase 108 55 - 135 U/L    AST 10 10 - 40 U/L    ALT 5 (L) 10 - 44 U/L    eGFR 21 (A) >60 mL/min/1.73 m^2    Anion Gap 17 (H) 8 - 16 mmol/L   Lactic acid, plasma #1    Collection Time: 10/01/23  6:03 PM   Result Value Ref Range    Lactate (Lactic Acid) 3.4 (H) 0.5 - 2.2 mmol/L   Magnesium    Collection Time: 10/01/23  6:03 PM   Result Value Ref Range    Magnesium 1.6 1.6 - 2.6 mg/dL   Phosphorus    Collection Time: 10/01/23  6:03 PM   Result Value Ref Range    Phosphorus 4.6 (H) 2.7 - 4.5 mg/dL   Brain natriuretic peptide    Collection Time:  10/01/23  6:03 PM   Result Value Ref Range    BNP <10 0 - 99 pg/mL   Troponin I    Collection Time: 10/01/23  6:03 PM   Result Value Ref Range    Troponin I <0.006 0.000 - 0.026 ng/mL   Procalcitonin    Collection Time: 10/01/23  6:03 PM   Result Value Ref Range    Procalcitonin 0.24 <0.25 ng/mL   Lipase    Collection Time: 10/01/23  6:03 PM   Result Value Ref Range    Lipase 33 4 - 60 U/L   COVID-19 Rapid Screening    Collection Time: 10/01/23  6:16 PM   Result Value Ref Range    SARS-CoV-2 RNA, Amplification, Qual Negative Negative   Influenza A & B by Molecular    Collection Time: 10/01/23  6:16 PM    Specimen: Nasopharyngeal Swab   Result Value Ref Range    Influenza A, Molecular Negative Negative    Influenza B, Molecular Negative Negative    Flu A & B Source Nasal swab    Urinalysis, Reflex to Urine Culture Urine, Clean Catch    Collection Time: 10/01/23  6:51 PM    Specimen: Urine   Result Value Ref Range    Specimen UA Urine, Clean Catch     Color, UA Orange (A) Yellow, Straw, Breanna    Appearance, UA Cloudy (A) Clear    pH, UA 6.0 5.0 - 8.0    Specific Gravity, UA 1.030 1.005 - 1.030    Protein, UA 3+ (A) Negative    Glucose, UA 1+ (A) Negative    Ketones, UA Negative Negative    Bilirubin (UA) 1+ (A) Negative    Occult Blood UA Trace (A) Negative    Nitrite, UA Negative Negative    Urobilinogen, UA >=8.0 (A) <2.0 EU/dL    Leukocytes, UA Trace (A) Negative   Urinalysis Microscopic    Collection Time: 10/01/23  6:51 PM   Result Value Ref Range    RBC, UA 8 (H) 0 - 4 /hpf    WBC, UA 23 (H) 0 - 5 /hpf    Bacteria Occasional None-Occ /hpf    Hyaline Casts, UA 31 (A) 0-1/lpf /lpf    Granular Casts, UA 18 (A) None /lpf    Amorphous, UA Occasional None-Moderate    Microscopic Comment SEE COMMENT    Lactic acid, plasma #2    Collection Time: 10/01/23 10:12 PM   Result Value Ref Range    Lactate (Lactic Acid) 3.3 (H) 0.5 - 2.2 mmol/L   Lactic acid, plasma    Collection Time: 10/02/23  2:11 AM   Result Value Ref Range     Lactate (Lactic Acid) 1.6 0.5 - 2.2 mmol/L   Basic Metabolic Panel    Collection Time: 10/02/23  9:17 AM   Result Value Ref Range    Sodium 138 136 - 145 mmol/L    Potassium 3.5 3.5 - 5.1 mmol/L    Chloride 102 95 - 110 mmol/L    CO2 24 23 - 29 mmol/L    Glucose 98 70 - 110 mg/dL    BUN 22 8 - 23 mg/dL    Creatinine 2.4 (H) 0.5 - 1.4 mg/dL    Calcium 7.9 (L) 8.7 - 10.5 mg/dL    Anion Gap 12 8 - 16 mmol/L    eGFR 22 (A) >60 mL/min/1.73 m^2   CBC Auto Differential    Collection Time: 10/02/23  9:18 AM   Result Value Ref Range    WBC 10.59 3.90 - 12.70 K/uL    RBC 3.75 (L) 4.00 - 5.40 M/uL    Hemoglobin 11.2 (L) 12.0 - 16.0 g/dL    Hematocrit 35.0 (L) 37.0 - 48.5 %    MCV 93 82 - 98 fL    MCH 29.9 27.0 - 31.0 pg    MCHC 32.0 32.0 - 36.0 g/dL    RDW 14.1 11.5 - 14.5 %    Platelets 235 150 - 450 K/uL    MPV 9.3 9.2 - 12.9 fL    Immature Granulocytes 0.3 0.0 - 0.5 %    Gran # (ANC) 7.4 1.8 - 7.7 K/uL    Immature Grans (Abs) 0.03 0.00 - 0.04 K/uL    Lymph # 2.0 1.0 - 4.8 K/uL    Mono # 0.7 0.3 - 1.0 K/uL    Eos # 0.5 0.0 - 0.5 K/uL    Baso # 0.04 0.00 - 0.20 K/uL    nRBC 0 0 /100 WBC    Gran % 70.0 38.0 - 73.0 %    Lymph % 18.5 18.0 - 48.0 %    Mono % 6.6 4.0 - 15.0 %    Eosinophil % 4.2 0.0 - 8.0 %    Basophil % 0.4 0.0 - 1.9 %    Differential Method Automated         Body mass index is 45.86 kg/m².    Physical Exam  Cardiovascular:      Rate and Rhythm: Normal rate and regular rhythm.      Pulses: Normal pulses.      Heart sounds: Normal heart sounds. No murmur heard.  Pulmonary:      Effort: Pulmonary effort is normal. No respiratory distress.      Breath sounds: Normal breath sounds.   Abdominal:      Tenderness: There is abdominal tenderness.   Neurological:      Mental Status: She is oriented to person, place, and time.      Motor: Weakness present.           Assessment and Plan   Current Problems List:  Active Hospital Problems    Diagnosis  POA    *Small bowel obstruction [K56.932]  Unknown    Leukocytosis  [D72.829]  Unknown    Elevated lactic acid level [R79.89]  Unknown    DALIA (acute kidney injury) [N17.9]  Unknown    Acute cystitis with hematuria [N30.01]  Unknown    Chronic low back pain [M54.50, G89.29]  Unknown    Accidental overdose [T50.901A]  Unknown    Type 2 diabetes mellitus with hyperglycemia, without long-term current use of insulin [E11.65]  Yes     Last Assessment & Plan:   Formatting of this note might be different from the original.  History & Physical   Will monitor Accu-Cheks.  Consideration for insulin coverage pending clinical course, blood glucose trend.  Discharge Summary   No acute issues.  Hold Metformin for now in light of elevated creatinine.  Follow-up Accuchecks stable. Will continue to monitor, will plan to cover with insulin if indicated.      Mixed hyperlipidemia [E78.2]  Yes     Last Assessment & Plan:   Formatting of this note might be different from the original.  History & Physical   Hold statin therapy in light of acute illness, nausea/vomiting.  Discharge Summary   Hold statin for now.  Can likely resume in the near future when her GI symptoms have improved.  Follow-up      Essential hypertension [I10]  Yes     Last Assessment & Plan:   Formatting of this note is different from the original.  History & Physical   All blood pressure medications for now in light of acute illness.  Will monitor and adjust/resume as appropriate.  Discharge Summary   Hold blood pressure medications in light of otherwise low-normal blood pressure, DALIA.   Follow-up Hold blood pressure medications in light of otherwise low-normal blood pressure, DALIA.  We will continue to monitor and adjust/resume as appropriate.        Resolved Hospital Problems   No resolved problems to display.     * Small bowel obstruction  Leukocytosis of 16.77 with elevated lactic acid level of 3.4 with revealed 3.3.  Plan:  -npo  -ivfs  -analgesics prn  -antiemetics prn  -gensurg consult in am        Leukocytosis  See above         Elevated lactic acid level  See above        Accidental overdose  Received 3 doses of 0.4 mg Narcan with response.  Excessive sedation likely a result from taking pain medication in setting of IVVD. Currently voices no complaints at this time.   Plan:  -hold narcotics   -tele monitoring  -monitor vitals  -supplemental O2 as needed  -additional narcan if needed     DALIA (acute kidney injury)  Patient with acute kidney injury likely d/t IVVD/Dehydration. DALIA is currently being treated. Labs reviewed- Renal function/electrolytes with Estimated Creatinine Clearance: 30.6 mL/min (A) (based on SCr of 2.5 mg/dL (H)). according to latest data. Monitor urine output and serial BMP and adjust therapy as needed. Avoid nephrotoxins and renally dose meds for GFR listed above.         Acute cystitis with hematuria  Urinalysis revealed:         Lab Results   Component Value Date     COLORU Orange (A) 10/01/2023     APPEARANCEUA Cloudy (A) 10/01/2023     SPECGRAV 1.030 10/01/2023     PHUR 6.0 10/01/2023     PROTEINUA 3+ (A) 10/01/2023     GLUCUA 1+ (A) 10/01/2023     KETONESU Negative 10/01/2023     NITRITE Negative 10/01/2023     UROBILINOGEN >=8.0 (A) 10/01/2023     BILIRUBINUA 1+ (A) 10/01/2023     LEUKOCYTESUR Trace (A) 10/01/2023     RBCUA 8 (H) 10/01/2023     WBCUA 23 (H) 10/01/2023     BACTERIA Occasional 10/01/2023     HYALINECASTS 31 (A) 10/01/2023   Received vanco and cefepime  Plan:  -UA culture pending  -Continue Abx                       Essential hypertension  Currently normotensive. BP usually well controlled per patient with home medications.  Plan:  -Optimize pain control   -Hold home medications (lisinopril, hctz) due to DALIA, titrate as needed   -Monitor BP  -Low salt/cardiac diet when not NPO  -IV hydralazine prn for SBP>160 or DBP>90              Mixed hyperlipidemia  Patient is chronically on statin.will not continue for now. Last Lipid Panel:         Lab Results   Component Value Date     CHOL 119 03/29/2023      HDL 48 03/29/2023     LDLCALC 51 03/29/2023     TRIG 110 03/29/2023     CHOLHDL 31.3 12/12/2012      Plan:  -Continue home medication  -low fat/low calorie diet           Type 2 diabetes mellitus with hyperglycemia, without long-term current use of insulin  Most recent A1c measuring         Hemoglobin A1C   Date Value Ref Range Status   02/23/2020 6.0 <=6.5 % Final   04/11/2013 6.2 4.0 - 6.2 % Final      Plan:  -SSI  -Accu-checks   -Hypoglycemic protocol   -Hold oral antihyperglycemics while inpatient               Chronic low back pain  Compliant with home meds.  Plan:  -continue lyrica  -hold narcotics due to accidental overdose            VTE Risk Mitigation (From admission, onward)           Ordered       Reason for No Pharmacological VTE Prophylaxis  Once        Question:  Reasons:  Answer:  Physician Provided (leave comment)    10/01/23 2342       IP VTE HIGH RISK PATIENT  Once         10/01/23 2342       Place sequential compression device  Until discontinued         10/01/23 2342                 //Core Measures   -DVT proph: SCDs, withholding anticoagulation pending surgical intervention   -Code status Full    -Surrogate:daughter      Corey Levin MS3  [facility]OC[facility]

## 2023-10-02 NOTE — H&P
[facility] Medicine  History & Physical    Patient Name: Roslyn Conde  MRN: [id number]  Patient Class: IP- Inpatient  Admission Date: 10/1/2023  Attending Physician: Branden Hawthorne MD   Primary Care Provider: Jed Munoz MD         Patient information was obtained from patient, parent, past medical records and ER records.     Subjective:     Principal Problem:Small bowel obstruction    Chief Complaint:   Chief Complaint   Patient presents with    Constipation    Dizziness     Generalized weakness and constipation per patient. Recent knee surgery. Has been taking pain medication.         HPI: Roslyn Conde is a 63 y.o. female with a PMH  has a past medical history of Chronic back pain, Diabetes mellitus, type 2, and Hypertension.  Presented to the ER for evaluation of dizziness with syncopal episode earlier today.  Patient reports she was on the commode when she had a large bowel movement.  Patient states that she was dizzy and felt like she was going to pass out while on the toilet.  Patient stated when she stood up to walk she had loss of consciousness.  Associated symptoms include generalized abdominal pain generalized weakness/fatigue and 1 episode of nonbloody vomitus and sweating.  Patient states that she was sitting pain management for back and knee pain.  Patient states her pain management doctor recently increased her Percocet to 4 times daily urine due to having persistent pain of her her left knee secondary to recent TKA.  Patient received Narcan with improvement of symptoms.  Patient states she takes her medication as prescribed.  Denies previous issues with pain medication.  States her generalized abdominal pain at 0/10.  Denies any other complaints at this time.    ER workup revealed leukocytosis of 16.77, BUN/creatinine of 2.5/21, CBG to 12 mg/dL, lactic acid of 3.4 with repeat level of 3.3 after IVF.  UA positive for cystitis.  All remaining blood work  unremarkable.  Patient received 2 g of vancomycin, 1 g of cefepime, 500 cc bolus of LR, sepsis bolus of LR at 2, 538 cc, and 3 doses of 0.4 mg of Narcan.  EKG revealed sinus tachycardia with a ventricular rate of 115 beats per minute with a QT/QTC of 456/630.  CT abdomen and pelvis revealed:[ Prominent small bowel loops in the pelvis measuring up to 33 mm in diameter with mild mucosal thickening suggestive of enteritis and partial small bowel obstruction. Mild associated mesenteric edema. Colonic loops of bowel are decompressed. Mild mucosal thickening of the transverse and right colon.  Correlate clinically for colitis]. Hospital Medicine consulted to admit patient for small-bowel obstruction.  Patient family at bedside are in agreement with treatment plan.  Patient will be admitted under inpatient status.    PCP: Jed Munoz      Past Medical History:   Diagnosis Date    Chronic back pain     Diabetes mellitus, type 2     Hypertension        Past Surgical History:   Procedure Laterality Date    ANKLE SURGERY      right    BACK SURGERY      HYSTERECTOMY      TONSILLECTOMY      TOTAL HIP ARTHROPLASTY Left     TUBAL LIGATION         Review of patient's allergies indicates:  No Known Allergies    No current facility-administered medications on file prior to encounter.     Current Outpatient Medications on File Prior to Encounter   Medication Sig    aspirin 81 MG Chew Take 81 mg by mouth.    cetirizine (ZYRTEC) 10 MG tablet Take 10 mg by mouth once daily.    FARXIGA 5 mg Tab tablet Take 5 mg by mouth every morning.    hydroCHLOROthiazide (HYDRODIURIL) 25 MG tablet Take 1 tablet (25 mg total) by mouth once daily.    hyoscyamine (ANASPAZ,LEVSIN) 0.125 mg Tab Take 125 mcg by mouth 4 (four) times daily.    linaCLOtide (LINZESS) 72 mcg Cap capsule Take 72 mcg by mouth before breakfast.    lisinopriL (PRINIVIL,ZESTRIL) 5 MG tablet Take 5 mg by mouth once daily.    metFORMIN (GLUCOPHAGE) 500 MG tablet  Take with meals. Take 1 qam for 1 wk, then bid for 1 wk, then 2 am/1 pm for 1 wk, then 2 bid till finished.    metoclopramide HCl (REGLAN) 10 MG tablet Take 1 tablet (10 mg total) by mouth every 6 (six) hours.    MOUNJARO 5 mg/0.5 mL PnIj Inject 5 mg into the skin once a week.    ondansetron (ZOFRAN-ODT) 4 MG TbDL     oxyCODONE-acetaminophen (PERCOCET)  mg per tablet Take 1 tablet by mouth.    pantoprazole (PROTONIX) 40 MG tablet Take 40 mg by mouth once daily.    pregabalin (LYRICA) 75 MG capsule Take 75 mg by mouth 2 (two) times daily.    rosuvastatin (CRESTOR) 20 MG tablet Take 20 mg by mouth nightly.    fluticasone propionate (FLONASE) 50 mcg/actuation nasal spray fluticasone propionate 50 mcg/actuation nasal spray,suspension   Spray 2 sprays every day by intranasal route.     Family History    None       Tobacco Use    Smoking status: Never    Smokeless tobacco: Never   Substance and Sexual Activity    Alcohol use: No    Drug use: No    Sexual activity: Not on file     Review of Systems   Gastrointestinal:  Positive for abdominal pain, diarrhea, nausea and vomiting. Negative for blood in stool.   Neurological:  Positive for dizziness, syncope, weakness and light-headedness.   All other systems reviewed and are negative.    Objective:     Vital Signs (Most Recent):  Temp: 98 °F (36.7 °C) (10/02/23 0059)  Pulse: 70 (10/02/23 0059)  Resp: 14 (10/02/23 0059)  BP: (!) 108/54 (10/02/23 0059)  SpO2: 100 % (10/02/23 0059) Vital Signs (24h Range):  Temp:  [98 °F (36.7 °C)-98.2 °F (36.8 °C)] 98 °F (36.7 °C)  Pulse:  [] 70  Resp:  [10-20] 14  SpO2:  [90 %-100 %] 100 %  BP: ()/(27-79) 108/54     Weight: 125 kg (275 lb 9.2 oz)  Body mass index is 45.86 kg/m².     Physical Exam  Vitals and nursing note reviewed.   Constitutional:       General: She is awake. She is not in acute distress.     Appearance: Normal appearance. She is well-developed and well-groomed. She is not ill-appearing,  toxic-appearing or diaphoretic.   HENT:      Head: Normocephalic and atraumatic.   Eyes:      Extraocular Movements: Extraocular movements intact.      Conjunctiva/sclera: Conjunctivae normal.   Cardiovascular:      Rate and Rhythm: Normal rate and regular rhythm.      Heart sounds: Normal heart sounds. No murmur heard.  Pulmonary:      Effort: Pulmonary effort is normal.      Breath sounds: Normal breath sounds.   Abdominal:      General: Bowel sounds are normal.      Palpations: Abdomen is soft.      Tenderness: There is abdominal tenderness. There is no right CVA tenderness, left CVA tenderness, guarding or rebound.   Musculoskeletal:      Cervical back: Normal range of motion and neck supple.      Comments: 5/5 strength throughout.  Well-healing surgical scar noted to left knee.  No surrounding erythema or edema noted.   Skin:     General: Skin is warm and dry.      Capillary Refill: Capillary refill takes less than 2 seconds.   Neurological:      General: No focal deficit present.      Mental Status: She is alert and oriented to person, place, and time. Mental status is at baseline.      GCS: GCS eye subscore is 4. GCS verbal subscore is 5. GCS motor subscore is 6.      Cranial Nerves: Cranial nerves 2-12 are intact.      Sensory: Sensation is intact.      Motor: Motor function is intact.   Psychiatric:         Mood and Affect: Mood normal.         Speech: Speech normal.         Behavior: Behavior normal. Behavior is cooperative.              LABS:  Recent Results (from the past 24 hour(s))   CBC auto differential    Collection Time: 10/01/23  6:03 PM   Result Value Ref Range    WBC 16.77 (H) 3.90 - 12.70 K/uL    RBC 5.47 (H) 4.00 - 5.40 M/uL    Hemoglobin 16.1 (H) 12.0 - 16.0 g/dL    Hematocrit 51.1 (H) 37.0 - 48.5 %    MCV 93 82 - 98 fL    MCH 29.4 27.0 - 31.0 pg    MCHC 31.5 (L) 32.0 - 36.0 g/dL    RDW 13.8 11.5 - 14.5 %    Platelets 320 150 - 450 K/uL    MPV 10.2 9.2 - 12.9 fL    Immature Granulocytes 1.3  (H) 0.0 - 0.5 %    Gran # (ANC) 14.9 (H) 1.8 - 7.7 K/uL    Immature Grans (Abs) 0.22 (H) 0.00 - 0.04 K/uL    Lymph # 1.1 1.0 - 4.8 K/uL    Mono # 0.4 0.3 - 1.0 K/uL    Eos # 0.1 0.0 - 0.5 K/uL    Baso # 0.06 0.00 - 0.20 K/uL    nRBC 0 0 /100 WBC    Gran % 89.1 (H) 38.0 - 73.0 %    Lymph % 6.6 (L) 18.0 - 48.0 %    Mono % 2.1 (L) 4.0 - 15.0 %    Eosinophil % 0.5 0.0 - 8.0 %    Basophil % 0.4 0.0 - 1.9 %    Platelet Estimate Clumped (A)     Differential Method Automated    Comprehensive metabolic panel    Collection Time: 10/01/23  6:03 PM   Result Value Ref Range    Sodium 141 136 - 145 mmol/L    Potassium 3.5 3.5 - 5.1 mmol/L    Chloride 100 95 - 110 mmol/L    CO2 24 23 - 29 mmol/L    Glucose 212 (H) 70 - 110 mg/dL    BUN 19 8 - 23 mg/dL    Creatinine 2.5 (H) 0.5 - 1.4 mg/dL    Calcium 9.1 8.7 - 10.5 mg/dL    Total Protein 7.3 6.0 - 8.4 g/dL    Albumin 3.4 (L) 3.5 - 5.2 g/dL    Total Bilirubin 1.1 (H) 0.1 - 1.0 mg/dL    Alkaline Phosphatase 108 55 - 135 U/L    AST 10 10 - 40 U/L    ALT 5 (L) 10 - 44 U/L    eGFR 21 (A) >60 mL/min/1.73 m^2    Anion Gap 17 (H) 8 - 16 mmol/L   Lactic acid, plasma #1    Collection Time: 10/01/23  6:03 PM   Result Value Ref Range    Lactate (Lactic Acid) 3.4 (H) 0.5 - 2.2 mmol/L   Magnesium    Collection Time: 10/01/23  6:03 PM   Result Value Ref Range    Magnesium 1.6 1.6 - 2.6 mg/dL   Phosphorus    Collection Time: 10/01/23  6:03 PM   Result Value Ref Range    Phosphorus 4.6 (H) 2.7 - 4.5 mg/dL   Brain natriuretic peptide    Collection Time: 10/01/23  6:03 PM   Result Value Ref Range    BNP <10 0 - 99 pg/mL   Troponin I    Collection Time: 10/01/23  6:03 PM   Result Value Ref Range    Troponin I <0.006 0.000 - 0.026 ng/mL   Procalcitonin    Collection Time: 10/01/23  6:03 PM   Result Value Ref Range    Procalcitonin 0.24 <0.25 ng/mL   Lipase    Collection Time: 10/01/23  6:03 PM   Result Value Ref Range    Lipase 33 4 - 60 U/L   COVID-19 Rapid Screening    Collection Time: 10/01/23  6:16  PM   Result Value Ref Range    SARS-CoV-2 RNA, Amplification, Qual Negative Negative   Influenza A & B by Molecular    Collection Time: 10/01/23  6:16 PM    Specimen: Nasopharyngeal Swab   Result Value Ref Range    Influenza A, Molecular Negative Negative    Influenza B, Molecular Negative Negative    Flu A & B Source Nasal swab    Urinalysis, Reflex to Urine Culture Urine, Clean Catch    Collection Time: 10/01/23  6:51 PM    Specimen: Urine   Result Value Ref Range    Specimen UA Urine, Clean Catch     Color, UA Orange (A) Yellow, Straw, Breanna    Appearance, UA Cloudy (A) Clear    pH, UA 6.0 5.0 - 8.0    Specific Gravity, UA 1.030 1.005 - 1.030    Protein, UA 3+ (A) Negative    Glucose, UA 1+ (A) Negative    Ketones, UA Negative Negative    Bilirubin (UA) 1+ (A) Negative    Occult Blood UA Trace (A) Negative    Nitrite, UA Negative Negative    Urobilinogen, UA >=8.0 (A) <2.0 EU/dL    Leukocytes, UA Trace (A) Negative   Urinalysis Microscopic    Collection Time: 10/01/23  6:51 PM   Result Value Ref Range    RBC, UA 8 (H) 0 - 4 /hpf    WBC, UA 23 (H) 0 - 5 /hpf    Bacteria Occasional None-Occ /hpf    Hyaline Casts, UA 31 (A) 0-1/lpf /lpf    Granular Casts, UA 18 (A) None /lpf    Amorphous, UA Occasional None-Moderate    Microscopic Comment SEE COMMENT    Lactic acid, plasma #2    Collection Time: 10/01/23 10:12 PM   Result Value Ref Range    Lactate (Lactic Acid) 3.3 (H) 0.5 - 2.2 mmol/L       RADIOLOGY  CT Abdomen Pelvis  Without Contrast    Result Date: 10/1/2023  EXAMINATION: CT ABDOMEN PELVIS WITHOUT CONTRAST CLINICAL HISTORY: Abdominal pain, acute, nonlocalized; TECHNIQUE: Low dose axial images, sagittal and coronal reformations were obtained from the lung bases to the pubic symphysis, COMPARISON: None FINDINGS: Heart: Normal in size as far as seen.  No pericardial effusion as far seen. Lung Bases: Well aerated, without consolidation or pleural fluid. Liver: Hepatomegaly. Gallbladder: Status post  cholecystectomy. Bile Ducts: No evidence of dilated ducts. Pancreas: No mass or peripancreatic fat stranding. Spleen: Unremarkable. Adrenals: Unremarkable. Kidneys/ Ureters: Hyperdense nodule involving the inferior pole of the right kidney measuring 14 mm.  Recommend follow-up MRI for further evaluation.  Cystic lesion of the superior pole of the kidney measuring 5 cm. Bladder: No evidence of wall thickening. Reproductive organs: Unremarkable. GI Tract/Mesentery: Prominent small bowel loops in the pelvis measuring up to 33 mm in diameter with mild mucosal thickening suggestive of enteritis and partial small bowel obstruction.  Mild mesenteric edema.  Colonic loops of bowel are decompressed.  Mild mucosal thickening of the transverse and right colon. Peritoneal Space: Mild ascites. Retroperitoneum: No significant adenopathy. Abdominal wall: Right-sided stimulator device in the buttocks region.  Postsurgical changes of the posterior lumbar subcutaneous fat with surgical scar and linear fluid density. Vasculature: No aneurysm.  Atherosclerotic changes. Bones: No acute fracture.  Spinal hardware identified with posterior fixation at S1 L5 L4 L3-L2.  Left hip prosthesis     Hyperdense nodule involving the inferior pole of the right kidney measuring 14 mm. Recommend follow-up MRI with contrast for further evaluation.  Cystic lesion of the superior pole of the kidney measuring 5 cm. Mild ascites. Prominent small bowel loops in the pelvis measuring up to 33 mm in diameter with mild mucosal thickening suggestive of enteritis and partial small bowel obstruction. Mild associated mesenteric edema. Colonic loops of bowel are decompressed. Mild mucosal thickening of the transverse and right colon.  Correlate clinically for colitis. All CT scans   are performed using dose optimization techniques including the following: automated exposure control; adjustment of the mA and/or kV; use of iterative reconstruction technique.  Dose  modulation was employed for ALARA by means of: Automated exposure control; adjustment of the mA and/or kV according to patient size (this includes techniques or standardized protocols for targeted exams where dose is matched to indication/reason for exam; i.e. extremities or head); and/or use of iterative reconstructive technique. Electronically signed by: Judson Pineda Date:    10/01/2023 Time:    19:34    X-Ray Chest AP Portable    Result Date: 10/1/2023  EXAMINATION: XR CHEST AP PORTABLE CLINICAL HISTORY: Sepsis; TECHNIQUE: Single frontal view of the chest was performed. COMPARISON: None FINDINGS: The lungs are clear, with normal appearance of pulmonary vasculature and no pleural effusion or pneumothorax. The cardiac silhouette is normal in size. The hilar and mediastinal contours are unremarkable. Bones are intact.     No acute abnormality. Electronically signed by: Judson Pineda Date:    10/01/2023 Time:    18:38    CT Lumbar Spine Without Contrast    Result Date: 9/17/2023  INDICATION: Pain over anterior intrathecal pump. Gait disturbance. TECHNIQUE: Non-contrast lumbar spine CT. Automated exposure control was used to reduce radiation dose. COMPARISON: Correlation with lumbar spine CT 7/12/2015 FINDINGS: Limited visualization due to artifact. No definitive acute fracture. Posterior spinal fusion hardware, L2-S1. Scoliosis. Advanced multilevel degenerative vertebral/disc base change. Bilateral sacroiliac joint degenerative change. Right renal cyst, 4.5 cm. Right gluteal pump with attached intraspinal wire/catheter extending into the thoracic region.    1. Advanced degenerative spine change 2. Scoliosis. 3. Posterior spinal fusion hardware, L2-S1. 4. Right gluteal/intrathecal pump. 5. Otherwise as described    X-Ray Knee 1 or 2 View Left    Result Date: 9/5/2023  XR KNEE 1 OR 2 VIEW LEFT CLINICAL INDICATION:  knee pain COMPARISON:None. FINDINGS/IMPRESSION: 2 views of the left knee were obtained. Postoperative  changes related to left total knee arthroplasty. Femoral and tibial components are intact with normal articulation. No evidence of hardware failure. Subcutaneous air present in the prepatellar region. Intra-articular air-fluid level seen in the suprapatellar recess. Skin staples in the midline of the left knee.      EKG    MICROBIOLOGY    MDM     Amount and/or Complexity of Data Reviewed  Clinical lab tests: reviewed  Tests in the radiology section of CPT®: reviewed  Tests in the medicine section of CPT®: reviewed  Discussion of test results with the performing providers: yes  Decide to obtain previous medical records or to obtain history from someone other than the patient: yes  Obtain history from someone other than the patient: yes  Review and summarize past medical records: yes  Discuss the patient with other providers: yes  Independent visualization of images, tracings, or specimens: yes          Assessment/Plan:     * Small bowel obstruction  Leukocytosis of 16.77 with elevated lactic acid level of 3.4 with revealed 3.3.  Plan:  -npo  -ivfs  -analgesics prn  -antiemetics prn  -gensurg consult in am      Leukocytosis  See above      Elevated lactic acid level  See above      Accidental overdose  Received 3 doses of 0.4 mg Narcan with response.  Excessive sedation likely a result from taking pain medication in setting of IVVD. Currently voices no complaints at this time.   Plan:  -hold narcotics   -tele monitoring  -monitor vitals  -supplemental O2 as needed  -additional narcan if needed    DALIA (acute kidney injury)  Patient with acute kidney injury likely d/t IVVD/Dehydration. DALIA is currently being treated. Labs reviewed- Renal function/electrolytes with Estimated Creatinine Clearance: 30.6 mL/min (A) (based on SCr of 2.5 mg/dL (H)). according to latest data. Monitor urine output and serial BMP and adjust therapy as needed. Avoid nephrotoxins and renally dose meds for GFR listed above.       Acute cystitis  with hematuria  Urinalysis revealed:   Lab Results   Component Value Date    COLORU Orange (A) 10/01/2023    APPEARANCEUA Cloudy (A) 10/01/2023    SPECGRAV 1.030 10/01/2023    PHUR 6.0 10/01/2023    PROTEINUA 3+ (A) 10/01/2023    GLUCUA 1+ (A) 10/01/2023    KETONESU Negative 10/01/2023    NITRITE Negative 10/01/2023    UROBILINOGEN >=8.0 (A) 10/01/2023    BILIRUBINUA 1+ (A) 10/01/2023    LEUKOCYTESUR Trace (A) 10/01/2023    RBCUA 8 (H) 10/01/2023    WBCUA 23 (H) 10/01/2023    BACTERIA Occasional 10/01/2023    HYALINECASTS 31 (A) 10/01/2023   Received vanco and cefepime  Plan:  -UA culture pending  -Continue Abx                Essential hypertension  Currently normotensive. BP usually well controlled per patient with home medications.  Plan:  -Optimize pain control   -Hold home medications (lisinopril, hctz) due to DALIA, titrate as needed   -Monitor BP  -Low salt/cardiac diet when not NPO  -IV hydralazine prn for SBP>160 or DBP>90           Mixed hyperlipidemia  Patient is chronically on statin.will not continue for now. Last Lipid Panel:   Lab Results   Component Value Date    CHOL 119 03/29/2023    HDL 48 03/29/2023    LDLCALC 51 03/29/2023    TRIG 110 03/29/2023    CHOLHDL 31.3 12/12/2012     Plan:  -Continue home medication  -low fat/low calorie diet        Type 2 diabetes mellitus with hyperglycemia, without long-term current use of insulin  Most recent A1c measuring   Hemoglobin A1C   Date Value Ref Range Status   02/23/2020 6.0 <=6.5 % Final   04/11/2013 6.2 4.0 - 6.2 % Final     Plan:  -SSI  -Accu-checks   -Hypoglycemic protocol   -Hold oral antihyperglycemics while inpatient           Chronic low back pain  Compliant with home meds.  Plan:  -continue lyrica  -hold narcotics due to accidental overdose      VTE Risk Mitigation (From admission, onward)         Ordered     Reason for No Pharmacological VTE Prophylaxis  Once        Question:  Reasons:  Answer:  Physician Provided (leave comment)    10/01/23 8680      IP VTE HIGH RISK PATIENT  Once         10/01/23 2342     Place sequential compression device  Until discontinued         10/01/23 2342              //Core Measures   -DVT proph: SCDs, withholding anticoagulation pending surgical intervention   -Code status Full    -Surrogate:daughter      Components of this note were documented using a voice recognition system and are subject to errors not corrected at the time the document was proof read. Please contact the author for any clarifications.       Abram Hawthorne NP  Department of Hospital Medicine  [facility] - Med S[facility]

## 2023-10-02 NOTE — ASSESSMENT & PLAN NOTE
Patient with acute kidney injury likely d/t IVVD/Dehydration. DALIA is currently being treated. Labs reviewed- Renal function/electrolytes with Estimated Creatinine Clearance: 31.9 mL/min (A) (based on SCr of 2.4 mg/dL (H)). according to latest data. Monitor urine output and serial BMP and adjust therapy as needed. Avoid nephrotoxins and renally dose meds for GFR listed above.     Cont IVF

## 2023-10-03 LAB
ANION GAP SERPL CALC-SCNC: 13 MMOL/L (ref 8–16)
BACTERIA UR CULT: NO GROWTH
BASOPHILS # BLD AUTO: 0.02 K/UL (ref 0–0.2)
BASOPHILS NFR BLD: 0.4 % (ref 0–1.9)
BUN SERPL-MCNC: 19 MG/DL (ref 8–23)
CALCIUM SERPL-MCNC: 8.8 MG/DL (ref 8.7–10.5)
CHLORIDE SERPL-SCNC: 105 MMOL/L (ref 95–110)
CO2 SERPL-SCNC: 26 MMOL/L (ref 23–29)
CREAT SERPL-MCNC: 1.9 MG/DL (ref 0.5–1.4)
DIFFERENTIAL METHOD: ABNORMAL
EOSINOPHIL # BLD AUTO: 0.4 K/UL (ref 0–0.5)
EOSINOPHIL NFR BLD: 7.6 % (ref 0–8)
ERYTHROCYTE [DISTWIDTH] IN BLOOD BY AUTOMATED COUNT: 13.7 % (ref 11.5–14.5)
EST. GFR  (NO RACE VARIABLE): 29 ML/MIN/1.73 M^2
GLUCOSE SERPL-MCNC: 92 MG/DL (ref 70–110)
HCT VFR BLD AUTO: 33.3 % (ref 37–48.5)
HGB BLD-MCNC: 10.5 G/DL (ref 12–16)
IMM GRANULOCYTES # BLD AUTO: 0.02 K/UL (ref 0–0.04)
IMM GRANULOCYTES NFR BLD AUTO: 0.4 % (ref 0–0.5)
LYMPHOCYTES # BLD AUTO: 0.9 K/UL (ref 1–4.8)
LYMPHOCYTES NFR BLD: 16.6 % (ref 18–48)
MCH RBC QN AUTO: 29.6 PG (ref 27–31)
MCHC RBC AUTO-ENTMCNC: 31.5 G/DL (ref 32–36)
MCV RBC AUTO: 94 FL (ref 82–98)
MONOCYTES # BLD AUTO: 0.3 K/UL (ref 0.3–1)
MONOCYTES NFR BLD: 6.3 % (ref 4–15)
NEUTROPHILS # BLD AUTO: 3.7 K/UL (ref 1.8–7.7)
NEUTROPHILS NFR BLD: 68.7 % (ref 38–73)
NRBC BLD-RTO: 0 /100 WBC
PLATELET # BLD AUTO: 180 K/UL (ref 150–450)
PMV BLD AUTO: 9.2 FL (ref 9.2–12.9)
POCT GLUCOSE: 111 MG/DL (ref 70–110)
POCT GLUCOSE: 140 MG/DL (ref 70–110)
POCT GLUCOSE: 39 MG/DL (ref 70–110)
POCT GLUCOSE: 58 MG/DL (ref 70–110)
POCT GLUCOSE: 62 MG/DL (ref 70–110)
POCT GLUCOSE: 63 MG/DL (ref 70–110)
POCT GLUCOSE: 93 MG/DL (ref 70–110)
POCT GLUCOSE: 93 MG/DL (ref 70–110)
POTASSIUM SERPL-SCNC: 3.6 MMOL/L (ref 3.5–5.1)
RBC # BLD AUTO: 3.55 M/UL (ref 4–5.4)
SODIUM SERPL-SCNC: 144 MMOL/L (ref 136–145)
WBC # BLD AUTO: 5.36 K/UL (ref 3.9–12.7)

## 2023-10-03 PROCEDURE — 36415 COLL VENOUS BLD VENIPUNCTURE: CPT | Performed by: INTERNAL MEDICINE

## 2023-10-03 PROCEDURE — 21400001 HC TELEMETRY ROOM

## 2023-10-03 PROCEDURE — 80048 BASIC METABOLIC PNL TOTAL CA: CPT | Performed by: INTERNAL MEDICINE

## 2023-10-03 PROCEDURE — 99232 SBSQ HOSP IP/OBS MODERATE 35: CPT | Mod: ,,, | Performed by: COLON & RECTAL SURGERY

## 2023-10-03 PROCEDURE — 25000003 PHARM REV CODE 250: Performed by: INTERNAL MEDICINE

## 2023-10-03 PROCEDURE — S5010 5% DEXTROSE AND 0.45% SALINE: HCPCS | Performed by: INTERNAL MEDICINE

## 2023-10-03 PROCEDURE — 99232 PR SUBSEQUENT HOSPITAL CARE,LEVL II: ICD-10-PCS | Mod: ,,, | Performed by: COLON & RECTAL SURGERY

## 2023-10-03 PROCEDURE — 63600175 PHARM REV CODE 636 W HCPCS: Performed by: NURSE PRACTITIONER

## 2023-10-03 PROCEDURE — 25000003 PHARM REV CODE 250: Performed by: NURSE PRACTITIONER

## 2023-10-03 PROCEDURE — 76937 US GUIDE VASCULAR ACCESS: CPT

## 2023-10-03 PROCEDURE — 85025 COMPLETE CBC W/AUTO DIFF WBC: CPT | Performed by: INTERNAL MEDICINE

## 2023-10-03 PROCEDURE — 94761 N-INVAS EAR/PLS OXIMETRY MLT: CPT

## 2023-10-03 RX ORDER — DEXTROSE MONOHYDRATE AND SODIUM CHLORIDE 5; .45 G/100ML; G/100ML
INJECTION, SOLUTION INTRAVENOUS CONTINUOUS
Status: DISCONTINUED | OUTPATIENT
Start: 2023-10-03 | End: 2023-10-05

## 2023-10-03 RX ORDER — CIPROFLOXACIN 750 MG/1
750 TABLET, FILM COATED ORAL EVERY 12 HOURS
Status: DISCONTINUED | OUTPATIENT
Start: 2023-10-03 | End: 2023-10-06 | Stop reason: HOSPADM

## 2023-10-03 RX ORDER — CIPROFLOXACIN 500 MG/1
500 TABLET ORAL EVERY 12 HOURS
Status: DISCONTINUED | OUTPATIENT
Start: 2023-10-03 | End: 2023-10-03 | Stop reason: DRUGHIGH

## 2023-10-03 RX ORDER — METRONIDAZOLE 500 MG/1
500 TABLET ORAL EVERY 8 HOURS
Status: DISCONTINUED | OUTPATIENT
Start: 2023-10-03 | End: 2023-10-06 | Stop reason: HOSPADM

## 2023-10-03 RX ADMIN — CIPROFLOXACIN 750 MG: 750 TABLET ORAL at 08:10

## 2023-10-03 RX ADMIN — ALUMINA, MAGNESIA, AND SIMETHICONE ORAL SUSPENSION REGULAR STRENGTH 30 ML: 1200; 1200; 120 SUSPENSION ORAL at 06:10

## 2023-10-03 RX ADMIN — METRONIDAZOLE 500 MG: 500 TABLET ORAL at 10:10

## 2023-10-03 RX ADMIN — DEXTROSE MONOHYDRATE 125 ML: 100 INJECTION, SOLUTION INTRAVENOUS at 04:10

## 2023-10-03 RX ADMIN — DEXTROSE MONOHYDRATE 125 ML: 100 INJECTION, SOLUTION INTRAVENOUS at 11:10

## 2023-10-03 RX ADMIN — SUCRALFATE 1 G: 1 SUSPENSION ORAL at 11:10

## 2023-10-03 RX ADMIN — ALUMINA, MAGNESIA, AND SIMETHICONE ORAL SUSPENSION REGULAR STRENGTH 30 ML: 1200; 1200; 120 SUSPENSION ORAL at 11:10

## 2023-10-03 RX ADMIN — DEXTROSE MONOHYDRATE 125 ML: 100 INJECTION, SOLUTION INTRAVENOUS at 12:10

## 2023-10-03 RX ADMIN — SUCRALFATE 1 G: 1 SUSPENSION ORAL at 04:10

## 2023-10-03 RX ADMIN — PREGABALIN 75 MG: 75 CAPSULE ORAL at 08:10

## 2023-10-03 RX ADMIN — ALUMINA, MAGNESIA, AND SIMETHICONE ORAL SUSPENSION REGULAR STRENGTH 30 ML: 1200; 1200; 120 SUSPENSION ORAL at 08:10

## 2023-10-03 RX ADMIN — SUCRALFATE 1 G: 1 SUSPENSION ORAL at 05:10

## 2023-10-03 RX ADMIN — DEXTROSE MONOHYDRATE AND SODIUM CHLORIDE: 5; .45 INJECTION, SOLUTION INTRAVENOUS at 04:10

## 2023-10-03 RX ADMIN — ALUMINA, MAGNESIA, AND SIMETHICONE ORAL SUSPENSION REGULAR STRENGTH 30 ML: 1200; 1200; 120 SUSPENSION ORAL at 04:10

## 2023-10-03 RX ADMIN — DEXTROSE MONOHYDRATE 250 ML: 100 INJECTION, SOLUTION INTRAVENOUS at 04:10

## 2023-10-03 RX ADMIN — DEXTROSE MONOHYDRATE 125 ML: 100 INJECTION, SOLUTION INTRAVENOUS at 01:10

## 2023-10-03 RX ADMIN — CEFEPIME 2 G: 2 INJECTION, POWDER, FOR SOLUTION INTRAVENOUS at 08:10

## 2023-10-03 NOTE — ASSESSMENT & PLAN NOTE
62yo F with enteritis/colitis vs pSBO    - No acute surgical intervention required at this time  - patient not clinically obstructed and not showing symptoms of partial obstruction. Patient with ongoing bowel function, no vomiting and no abdominal distention. Exam and history more consistent with enteritis/colitis or pseudo-obstruction from narcotic abuse  - trial of clear liquids  - hold narcotics if able

## 2023-10-03 NOTE — CONSULTS
Encouraged patient to turn and reposition every 2 hours. Also ordered MAGNOLIA pump or waffle top to mattress. Patient states she had knee surgery the beginning of September and after having the surgery she wasn't moving around much and on her back and bottom most of the time. Patient states she noticed she had a sore area to her buttock prior to admit and when she wiped she had blood on toliet paper. Because the location of wound I cleaned area and applied barrier cream.    10/03/23 0900   WOCN Assessment   WOCN Total Time (mins) 45   Visit Date 10/03/23   Visit Time 0900   Consult Type New   WOCN Speciality Wound   Wound pressure   Intervention assessed;applied   Teaching on-going   Pressure Injury Prevention    Check Moisture Management Pad Done        Altered Skin Integrity 10/02/23 1915 medial Coccyx #1 Full thickness tissue loss. Base is covered by slough and/or eschar in the wound bed   Date First Assessed/Time First Assessed: 10/02/23 1915   Altered Skin Integrity Present on Admission - Did Patient arrive to the hospital with altered skin?: yes  Orientation: medial  Location: Coccyx  Wound Number: #1  Description of Altered Skin Int...   Wound Image    Description of Altered Skin Integrity Full thickness tissue loss. Base is covered by slough and/or eschar in the wound bed   Dressing Appearance Open to air   Drainage Amount None   Appearance Red;Pink;Yellow   Tissue loss description Full thickness   Wound Length (cm) 4 cm   Wound Width (cm) 4 cm   Wound Depth (cm) 0.2 cm   Wound Volume (cm^3) 3.2 cm^3   Wound Surface Area (cm^2) 16 cm^2   Care Cleansed with:;Sterile normal saline;Applied:;Skin Barrier   Dressing Other (comment)  (orange top barrier cream)

## 2023-10-03 NOTE — ASSESSMENT & PLAN NOTE
Leukocytosis of 16.77 with elevated lactic acid level of 3.4 with revealed 3.3.  Plan:  -npo  -ivfs  -analgesics prn  -antiemetics prn  -gensurg consult in am    10/2:  Partial SBO on CT scan  Pt reported having bowel movement overnight  No nausea and vomiting currently  No NG tube in place  Will plan to place NG tube should pt become symptomatic   Surgery consulted on case  Cont NPO  IVF     change IVAB to po

## 2023-10-03 NOTE — ASSESSMENT & PLAN NOTE
Urinalysis revealed:   Lab Results   Component Value Date    COLORU Orange (A) 10/01/2023    APPEARANCEUA Cloudy (A) 10/01/2023    SPECGRAV 1.030 10/01/2023    PHUR 6.0 10/01/2023    PROTEINUA 3+ (A) 10/01/2023    GLUCUA 1+ (A) 10/01/2023    KETONESU Negative 10/01/2023    NITRITE Negative 10/01/2023    UROBILINOGEN >=8.0 (A) 10/01/2023    BILIRUBINUA 1+ (A) 10/01/2023    LEUKOCYTESUR Trace (A) 10/01/2023    RBCUA 8 (H) 10/01/2023    WBCUA 23 (H) 10/01/2023    BACTERIA Occasional 10/01/2023    HYALINECASTS 31 (A) 10/01/2023   Received vanco and cefepime  Plan:  -UA culture pending  -Continue Abx    10/2:  Cont cefepime

## 2023-10-03 NOTE — PLAN OF CARE
Problem: Adult Inpatient Plan of Care  Goal: Plan of Care Review  Outcome: Ongoing, Progressing  Flowsheets (Taken 10/3/2023 0257)  Plan of Care Reviewed With: patient  Goal: Patient-Specific Goal (Individualized)  Outcome: Ongoing, Progressing  Goal: Absence of Hospital-Acquired Illness or Injury  Outcome: Ongoing, Progressing  Intervention: Identify and Manage Fall Risk  Flowsheets (Taken 10/3/2023 0257)  Safety Promotion/Fall Prevention:   assistive device/personal item within reach   bed alarm set   bed alarm refused   Fall Risk signage in place   Fall Risk reviewed with patient/family  Intervention: Prevent Skin Injury  Flowsheets (Taken 10/3/2023 0257)  Body Position: position changed independently  Skin Protection: adhesive use limited  Intervention: Prevent Infection  Flowsheets (Taken 10/3/2023 0257)  Infection Prevention:   hand hygiene promoted   rest/sleep promoted  Goal: Optimal Comfort and Wellbeing  Outcome: Ongoing, Progressing  Intervention: Monitor Pain and Promote Comfort  Flowsheets (Taken 10/3/2023 0257)  Pain Management Interventions:   around-the-clock dosing utilized   awakened for pain meds per patient request   care clustered   quiet environment facilitated   relaxation techniques promoted  Intervention: Provide Person-Centered Care  Flowsheets (Taken 10/3/2023 0257)  Trust Relationship/Rapport:   care explained   questions answered   choices provided   questions encouraged   emotional support provided   reassurance provided   empathic listening provided   thoughts/feelings acknowledged  Goal: Readiness for Transition of Care  Outcome: Ongoing, Progressing     Problem: Bariatric Environmental Safety  Goal: Safety Maintained with Care  Outcome: Ongoing, Progressing     Problem: Diabetes Comorbidity  Goal: Blood Glucose Level Within Targeted Range  Outcome: Ongoing, Progressing  Intervention: Monitor and Manage Glycemia  Flowsheets (Taken 10/3/2023 0257)  Glycemic Management: blood glucose  monitored     Problem: Fluid and Electrolyte Imbalance (Acute Kidney Injury/Impairment)  Goal: Fluid and Electrolyte Balance  Outcome: Ongoing, Progressing     Problem: Oral Intake Inadequate (Acute Kidney Injury/Impairment)  Goal: Optimal Nutrition Intake  Outcome: Ongoing, Progressing     Problem: Renal Function Impairment (Acute Kidney Injury/Impairment)  Goal: Effective Renal Function  Outcome: Ongoing, Progressing     Problem: Skin Injury Risk Increased  Goal: Skin Health and Integrity  Outcome: Ongoing, Progressing     Problem: Impaired Wound Healing  Goal: Optimal Wound Healing  Outcome: Ongoing, Progressing

## 2023-10-03 NOTE — PROGRESS NOTES
[facility]  Progress Note    Patient Name: Roslyn Conde  MRN: [id number]  Patient Class: IP- Inpatient   Admission Date: 10/1/2023  Length of Stay: 2 days  Attending Physician: Stephan Keating,*  Primary Care Provider: Jed Munoz MD        Subjective:     Principal Problem:Enteritis        HPI:  Roslyn Conde is a 63 y.o. female with a PMH  has a past medical history of Chronic back pain, Diabetes mellitus, type 2, and Hypertension.  Presented to the ER for evaluation of dizziness with syncopal episode earlier today.  Patient reports she was on the commode when she had a large bowel movement.  Patient states that she was dizzy and felt like she was going to pass out while on the toilet.  Patient stated when she stood up to walk she had loss of consciousness.  Associated symptoms include generalized abdominal pain generalized weakness/fatigue and 1 episode of nonbloody vomitus and sweating.  Patient states that she was sitting pain management for back and knee pain.  Patient states her pain management doctor recently increased her Percocet to 4 times daily urine due to having persistent pain of her her left knee secondary to recent TKA.  Patient received Narcan with improvement of symptoms.  Patient states she takes her medication as prescribed.  Denies previous issues with pain medication.  States her generalized abdominal pain at 0/10.  Denies any other complaints at this time.    ER workup revealed leukocytosis of 16.77, BUN/creatinine of 2.5/21, CBG to 12 mg/dL, lactic acid of 3.4 with repeat level of 3.3 after IVF.  UA positive for cystitis.  All remaining blood work unremarkable.  Patient received 2 g of vancomycin, 1 g of cefepime, 500 cc bolus of LR, sepsis bolus of LR at 2, 538 cc, and 3 doses of 0.4 mg of Narcan.  EKG revealed sinus tachycardia with a ventricular rate of 115 beats per minute with a QT/QTC of 456/630.  CT abdomen and pelvis revealed:[ Prominent  small bowel loops in the pelvis measuring up to 33 mm in diameter with mild mucosal thickening suggestive of enteritis and partial small bowel obstruction. Mild associated mesenteric edema. Colonic loops of bowel are decompressed. Mild mucosal thickening of the transverse and right colon.  Correlate clinically for colitis]. Hospital Medicine consulted to admit patient for small-bowel obstruction.  Patient family at bedside are in agreement with treatment plan.  Patient will be admitted under inpatient status.    PCP: Jed Munoz      Overview/Hospital Course:  10/2: pt reports having bowel movement overnight. Complains of nausea however no vomiting. Will repeat KUB  10/3 She denies nay new complains . She is toelrating liquid diet . Surgery rec no surgical intervention at this time and cot medical tx       Interval History:     Review of Systems   Constitutional:  Positive for fatigue. Negative for activity change, appetite change, chills, fever and unexpected weight change.   HENT:  Negative for congestion, ear pain, sore throat and trouble swallowing.    Eyes:  Negative for pain, redness and itching.   Respiratory:  Negative for cough, shortness of breath and wheezing.    Cardiovascular:  Negative for chest pain, palpitations and leg swelling.   Gastrointestinal:  Positive for abdominal pain, constipation and nausea. Negative for abdominal distention, anal bleeding, blood in stool, diarrhea, rectal pain and vomiting.   Endocrine: Negative for cold intolerance, heat intolerance and polyuria.   Genitourinary:  Negative for dysuria, flank pain, frequency and hematuria.   Musculoskeletal:  Negative for gait problem, joint swelling and neck pain.   Skin:  Negative for color change, rash and wound.   Allergic/Immunologic: Negative for environmental allergies and immunocompromised state.   Neurological:  Negative for dizziness, speech difficulty, weakness and numbness.   Psychiatric/Behavioral:  Negative for  agitation, confusion and hallucinations.      Objective:     Vital Signs (Most Recent):  Temp: 97.6 °F (36.4 °C) (10/03/23 1224)  Pulse: 72 (10/03/23 1224)  Resp: 20 (10/03/23 1224)  BP: (!) 113/58 (10/03/23 1224)  SpO2: 95 % (10/03/23 1224) Vital Signs (24h Range):  Temp:  [97.4 °F (36.3 °C)-98 °F (36.7 °C)] 97.6 °F (36.4 °C)  Pulse:  [58-72] 72  Resp:  [16-20] 20  SpO2:  [91 %-98 %] 95 %  BP: ()/(47-58) 113/58     Weight: 125 kg (275 lb 9.2 oz)  Body mass index is 45.86 kg/m².    Intake/Output Summary (Last 24 hours) at 10/3/2023 1603  Last data filed at 10/3/2023 1451  Gross per 24 hour   Intake 4009.24 ml   Output 1500 ml   Net 2509.24 ml         Physical Exam  Constitutional:       Appearance: She is well-developed. She is obese.   HENT:      Head: Normocephalic and atraumatic.   Eyes:      Conjunctiva/sclera: Conjunctivae normal.   Neck:      Thyroid: No thyromegaly.   Cardiovascular:      Rate and Rhythm: Normal rate.   Pulmonary:      Effort: Pulmonary effort is normal. No respiratory distress.   Abdominal:      Comments: Soft, nondistended, nontender; no rebound or guarding; no tympany; well-healed previous port sites   Musculoskeletal:         General: No tenderness. Normal range of motion.      Cervical back: Normal range of motion.   Skin:     General: Skin is warm and dry.      Capillary Refill: Capillary refill takes less than 2 seconds.      Findings: No rash.   Neurological:      Mental Status: She is alert and oriented to person, place, and time.             Significant Labs: All pertinent labs within the past 24 hours have been reviewed.  Recent Lab Results  (Last 5 results in the past 24 hours)        10/03/23  1317   10/03/23  1303   10/03/23  1248   10/03/23  0816   10/03/23  0507        Anion Gap       13         Baso #       0.02         Basophil %       0.4         BUN       19         Calcium       8.8         Chloride       105         CO2       26         Creatinine       1.9          Differential Method       Automated         eGFR       29         Eos #       0.4         Eosinophil %       7.6         Glucose       92         Gran # (ANC)       3.7         Gran %       68.7         Hematocrit       33.3         Hemoglobin       10.5         Immature Grans (Abs)       0.02  Comment: Mild elevation in immature granulocytes is non specific and   can be seen in a variety of conditions including stress response,   acute inflammation, trauma and pregnancy. Correlation with other   laboratory and clinical findings is essential.           Immature Granulocytes       0.4         Lymph #       0.9         Lymph %       16.6         MCH       29.6         MCHC       31.5         MCV       94         Mono #       0.3         Mono %       6.3         MPV       9.2         nRBC       0         Platelet Count       180         POCT Glucose 140   62   58     111       Potassium       3.6         RBC       3.55         RDW       13.7         Sodium       144         WBC       5.36                                Significant Imaging: I have reviewed all pertinent imaging results/findings within the past 24 hours.      Assessment/Plan:      * Enteritis  Leukocytosis of 16.77 with elevated lactic acid level of 3.4 with revealed 3.3.  Plan:  -npo  -ivfs  -analgesics prn  -antiemetics prn  -gensurg consult in am    10/2:  Partial SBO on CT scan  Pt reported having bowel movement overnight  No nausea and vomiting currently  No NG tube in place  Will plan to place NG tube should pt become symptomatic   Surgery consulted on case  Cont NPO  IVF     change IVAB to po       Accidental overdose  Received 3 doses of 0.4 mg Narcan with response.  Excessive sedation likely a result from taking pain medication in setting of IVVD. Currently voices no complaints at this time.   Plan:  -hold narcotics   -tele monitoring  -monitor vitals  -supplemental O2 as needed  -additional narcan if needed    Chronic low back pain  Compliant  with home meds.  Plan:  -continue lyrica  -hold narcotics due to accidental overdose      Acute cystitis with hematuria  Urinalysis revealed:   Lab Results   Component Value Date    COLORU Orange (A) 10/01/2023    APPEARANCEUA Cloudy (A) 10/01/2023    SPECGRAV 1.030 10/01/2023    PHUR 6.0 10/01/2023    PROTEINUA 3+ (A) 10/01/2023    GLUCUA 1+ (A) 10/01/2023    KETONESU Negative 10/01/2023    NITRITE Negative 10/01/2023    UROBILINOGEN >=8.0 (A) 10/01/2023    BILIRUBINUA 1+ (A) 10/01/2023    LEUKOCYTESUR Trace (A) 10/01/2023    RBCUA 8 (H) 10/01/2023    WBCUA 23 (H) 10/01/2023    BACTERIA Occasional 10/01/2023    HYALINECASTS 31 (A) 10/01/2023   Received vanco and cefepime  Plan:  -UA culture pending  -Continue Abx    10/2:  Cont cefepime      DALIA (acute kidney injury)  Patient with acute kidney injury likely d/t IVVD/Dehydration. DALIA is currently being treated. Labs reviewed- Renal function/electrolytes with Estimated Creatinine Clearance: 40.3 mL/min (A) (based on SCr of 1.9 mg/dL (H)). according to latest data. Monitor urine output and serial BMP and adjust therapy as needed. Avoid nephrotoxins and renally dose meds for GFR listed above.     Cont IVF    Elevated lactic acid level  See above      Leukocytosis  Possibly reactive  Will cont IVF  Empiric cefepime for now         Mixed hyperlipidemia  Patient is chronically on statin.will not continue for now. Last Lipid Panel:   Lab Results   Component Value Date    CHOL 119 03/29/2023    HDL 48 03/29/2023    LDLCALC 51 03/29/2023    TRIG 110 03/29/2023    CHOLHDL 31.3 12/12/2012     Plan:  -Continue home medication  -low fat/low calorie diet        Morbid obesity  Body mass index is 45.86 kg/m². Morbid obesity complicates all aspects of disease management from diagnostic modalities to treatment. Weight loss encouraged and health benefits explained to patient.         Type 2 diabetes mellitus with hyperglycemia, without long-term current use of insulin  Most recent  A1c measuring   Hemoglobin A1C   Date Value Ref Range Status   02/23/2020 6.0 <=6.5 % Final   04/11/2013 6.2 4.0 - 6.2 % Final     Plan:  -SSI  -Accu-checks   -Hypoglycemic protocol   -Hold oral antihyperglycemics while inpatient           Essential hypertension  Currently normotensive. BP usually well controlled per patient with home medications.  Plan:  -Optimize pain control   -Hold home medications (lisinopril, hctz) due to DALIA, titrate as needed   -Monitor BP  -Low salt/cardiac diet when not NPO  -IV hydralazine prn for SBP>160 or DBP>90             VTE Risk Mitigation (From admission, onward)         Ordered     Reason for No Pharmacological VTE Prophylaxis  Once        Question:  Reasons:  Answer:  Physician Provided (leave comment)    10/01/23 2342     IP VTE HIGH RISK PATIENT  Once         10/01/23 2342     Place sequential compression device  Until discontinued         10/01/23 2342                Discharge Planning   ANDRÉS:      Code Status: Full Code   Is the patient medically ready for discharge?:     Reason for patient still in hospital (select all that apply): Treatment  Discharge Plan A: Home with family                  Stephan Stone MD  Department of Hospital Medicine   [facility] Med [facility]

## 2023-10-03 NOTE — MEDICAL/APP STUDENT
Date of Admit: 10/1/2023    HPI:         Chief Complaint   Patient presents with   Constipation and Dizziness (Generalized weakness and constipation per patient. Recent knee surgery. Has been taking pain medication. )    Roslyn Conde is a 63 y.o. female with a PMH  has a past medical history of Chronic back pain, Diabetes mellitus, type 2, and Hypertension.  Presented to the ER for evaluation of dizziness with syncopal episode earlier today.  Patient reports she was on the commode when she had a large bowel movement.  Patient states that she was dizzy and felt like she was going to pass out while on the toilet.  Patient stated when she stood up to walk she had loss of consciousness.  Associated symptoms include generalized abdominal pain generalized weakness/fatigue and 1 episode of nonbloody vomitus and sweating.  Patient states that she was sitting pain management for back and knee pain.  Patient states her pain management doctor recently increased her Percocet to 4 times daily urine due to having persistent pain of her her left knee secondary to recent TKA.  Patient received Narcan with improvement of symptoms.  Patient states she takes her medication as prescribed.  Denies previous issues with pain medication.  States her generalized abdominal pain at 0/10.  Denies any other complaints at this time.     ER workup revealed leukocytosis of 16.77, BUN/creatinine of 2.5/21, CBG to 12 mg/dL, lactic acid of 3.4 with repeat level of 3.3 after IVF.  UA positive for cystitis.  All remaining blood work unremarkable.  Patient received 2 g of vancomycin, 1 g of cefepime, 500 cc bolus of LR, sepsis bolus of LR at 2, 538 cc, and 3 doses of 0.4 mg of Narcan.  EKG revealed sinus tachycardia with a ventricular rate of 115 beats per minute with a QT/QTC of 456/630.  CT abdomen and pelvis revealed:[ Prominent small bowel loops in the pelvis measuring up to 33 mm in diameter with mild mucosal thickening suggestive of enteritis  and partial small bowel obstruction. Mild associated mesenteric edema. Colonic loops of bowel are decompressed. Mild mucosal thickening of the transverse and right colon.  Correlate clinically for colitis]. Hospital Medicine consulted to admit patient for small-bowel obstruction.  Patient family at bedside are in agreement with treatment plan.  Patient will be admitted under inpatient status.      Interval History: No issues overnight. Surgery consult states that exam and history more consistent with enteritis/colitis or pseudo-obstruction from narcotic abuse.      MEDICATIONS & ALLERGIES:     No current facility-administered medications on file prior to encounter.     Current Outpatient Medications on File Prior to Encounter   Medication Sig Dispense Refill    aspirin 81 MG Chew Take 81 mg by mouth.      cetirizine (ZYRTEC) 10 MG tablet Take 10 mg by mouth once daily.      FARXIGA 5 mg Tab tablet Take 5 mg by mouth every morning.      hydroCHLOROthiazide (HYDRODIURIL) 25 MG tablet Take 1 tablet (25 mg total) by mouth once daily. 90 tablet 3    hyoscyamine (ANASPAZ,LEVSIN) 0.125 mg Tab Take 125 mcg by mouth 4 (four) times daily.      linaCLOtide (LINZESS) 72 mcg Cap capsule Take 72 mcg by mouth before breakfast.      lisinopriL (PRINIVIL,ZESTRIL) 5 MG tablet Take 5 mg by mouth once daily.      metFORMIN (GLUCOPHAGE) 500 MG tablet Take with meals. Take 1 qam for 1 wk, then bid for 1 wk, then 2 am/1 pm for 1 wk, then 2 bid till finished. 70 tablet 0    metoclopramide HCl (REGLAN) 10 MG tablet Take 1 tablet (10 mg total) by mouth every 6 (six) hours. 30 tablet 0    MOUNJARO 5 mg/0.5 mL PnIj Inject 5 mg into the skin once a week.      ondansetron (ZOFRAN-ODT) 4 MG TbDL       oxyCODONE-acetaminophen (PERCOCET)  mg per tablet Take 1 tablet by mouth.      pantoprazole (PROTONIX) 40 MG tablet Take 40 mg by mouth once daily.      pregabalin (LYRICA) 75 MG capsule Take 75 mg by mouth 2 (two) times daily.       rosuvastatin (CRESTOR) 20 MG tablet Take 20 mg by mouth nightly.      fluticasone propionate (FLONASE) 50 mcg/actuation nasal spray fluticasone propionate 50 mcg/actuation nasal spray,suspension   Spray 2 sprays every day by intranasal route.          Review of patient's allergies indicates:  No Known Allergies      PAST HISTORY:     Past Medical History:   Diagnosis Date    Chronic back pain     Diabetes mellitus, type 2     Hypertension        Scheduled Meds:    aluminum-magnesium hydroxide-simethicone  30 mL Oral QID (AC & HS)    ceFEPime (MAXIPIME) IVPB  2 g Intravenous Q12H    pregabalin  75 mg Oral BID    sucralfate  1 g Oral Q6H      PRN Meds: acetaminophen, acetaminophen, albuterol-ipratropium, aluminum-magnesium hydroxide-simethicone, dextrose 10%, dextrose 10%, glucagon (human recombinant), glucose, glucose, hydrALAZINE, HYDROcodone-acetaminophen, insulin aspart U-100, melatonin, morphine, naloxone, polyethylene glycol, prochlorperazine, simethicone, sodium chloride 0.9%     Past Surgical History:   Procedure Laterality Date    ANKLE SURGERY      right    BACK SURGERY      HYSTERECTOMY      TONSILLECTOMY      TOTAL HIP ARTHROPLASTY Left     TUBAL LIGATION         History reviewed. No pertinent family history.    Social History     Socioeconomic History    Marital status:    Tobacco Use    Smoking status: Never    Smokeless tobacco: Never   Substance and Sexual Activity    Alcohol use: No    Drug use: No       Review of Systems:  Review of Systems   Constitutional:  Positive for malaise/fatigue.   HENT:  Negative for congestion and sore throat.    Respiratory:  Negative for cough, wheezing and stridor.    Cardiovascular:  Negative for chest pain and palpitations.   Gastrointestinal:  Positive for constipation. Negative for diarrhea.       Exam     Vitals:    10/03/23 0300 10/03/23 0348 10/03/23 0500 10/03/23 0745   BP:  (!) 101/55  (!) 106/52   BP Location:       Patient Position:  Lying     Pulse:  67 67 69 71   Resp:  18  16   Temp:  97.9 °F (36.6 °C)  98 °F (36.7 °C)   TempSrc:  Oral     SpO2:  (!) 92%  (!) 91%   Weight:       Height:           Recent Results (from the past 72 hour(s))   Blood culture x two cultures. Draw prior to antibiotics.    Collection Time: 10/01/23  5:50 PM    Specimen: Peripheral, Antecubital, Left; Blood   Result Value Ref Range    Blood Culture, Routine No Growth to date     Blood Culture, Routine No Growth to date    CBC auto differential    Collection Time: 10/01/23  6:03 PM   Result Value Ref Range    WBC 16.77 (H) 3.90 - 12.70 K/uL    RBC 5.47 (H) 4.00 - 5.40 M/uL    Hemoglobin 16.1 (H) 12.0 - 16.0 g/dL    Hematocrit 51.1 (H) 37.0 - 48.5 %    MCV 93 82 - 98 fL    MCH 29.4 27.0 - 31.0 pg    MCHC 31.5 (L) 32.0 - 36.0 g/dL    RDW 13.8 11.5 - 14.5 %    Platelets 320 150 - 450 K/uL    MPV 10.2 9.2 - 12.9 fL    Immature Granulocytes 1.3 (H) 0.0 - 0.5 %    Gran # (ANC) 14.9 (H) 1.8 - 7.7 K/uL    Immature Grans (Abs) 0.22 (H) 0.00 - 0.04 K/uL    Lymph # 1.1 1.0 - 4.8 K/uL    Mono # 0.4 0.3 - 1.0 K/uL    Eos # 0.1 0.0 - 0.5 K/uL    Baso # 0.06 0.00 - 0.20 K/uL    nRBC 0 0 /100 WBC    Gran % 89.1 (H) 38.0 - 73.0 %    Lymph % 6.6 (L) 18.0 - 48.0 %    Mono % 2.1 (L) 4.0 - 15.0 %    Eosinophil % 0.5 0.0 - 8.0 %    Basophil % 0.4 0.0 - 1.9 %    Platelet Estimate Clumped (A)     Differential Method Automated    Comprehensive metabolic panel    Collection Time: 10/01/23  6:03 PM   Result Value Ref Range    Sodium 141 136 - 145 mmol/L    Potassium 3.5 3.5 - 5.1 mmol/L    Chloride 100 95 - 110 mmol/L    CO2 24 23 - 29 mmol/L    Glucose 212 (H) 70 - 110 mg/dL    BUN 19 8 - 23 mg/dL    Creatinine 2.5 (H) 0.5 - 1.4 mg/dL    Calcium 9.1 8.7 - 10.5 mg/dL    Total Protein 7.3 6.0 - 8.4 g/dL    Albumin 3.4 (L) 3.5 - 5.2 g/dL    Total Bilirubin 1.1 (H) 0.1 - 1.0 mg/dL    Alkaline Phosphatase 108 55 - 135 U/L    AST 10 10 - 40 U/L    ALT 5 (L) 10 - 44 U/L    eGFR 21 (A) >60 mL/min/1.73 m^2    Anion Gap  17 (H) 8 - 16 mmol/L   Lactic acid, plasma #1    Collection Time: 10/01/23  6:03 PM   Result Value Ref Range    Lactate (Lactic Acid) 3.4 (H) 0.5 - 2.2 mmol/L   Magnesium    Collection Time: 10/01/23  6:03 PM   Result Value Ref Range    Magnesium 1.6 1.6 - 2.6 mg/dL   Phosphorus    Collection Time: 10/01/23  6:03 PM   Result Value Ref Range    Phosphorus 4.6 (H) 2.7 - 4.5 mg/dL   Brain natriuretic peptide    Collection Time: 10/01/23  6:03 PM   Result Value Ref Range    BNP <10 0 - 99 pg/mL   Troponin I    Collection Time: 10/01/23  6:03 PM   Result Value Ref Range    Troponin I <0.006 0.000 - 0.026 ng/mL   Procalcitonin    Collection Time: 10/01/23  6:03 PM   Result Value Ref Range    Procalcitonin 0.24 <0.25 ng/mL   Lipase    Collection Time: 10/01/23  6:03 PM   Result Value Ref Range    Lipase 33 4 - 60 U/L   Blood culture x two cultures. Draw prior to antibiotics.    Collection Time: 10/01/23  6:05 PM    Specimen: Peripheral, Hand, Right; Blood   Result Value Ref Range    Blood Culture, Routine No Growth to date     Blood Culture, Routine No Growth to date    COVID-19 Rapid Screening    Collection Time: 10/01/23  6:16 PM   Result Value Ref Range    SARS-CoV-2 RNA, Amplification, Qual Negative Negative   Influenza A & B by Molecular    Collection Time: 10/01/23  6:16 PM    Specimen: Nasopharyngeal Swab   Result Value Ref Range    Influenza A, Molecular Negative Negative    Influenza B, Molecular Negative Negative    Flu A & B Source Nasal swab    Urinalysis, Reflex to Urine Culture Urine, Clean Catch    Collection Time: 10/01/23  6:51 PM    Specimen: Urine   Result Value Ref Range    Specimen UA Urine, Clean Catch     Color, UA Orange (A) Yellow, Straw, Breanna    Appearance, UA Cloudy (A) Clear    pH, UA 6.0 5.0 - 8.0    Specific Gravity, UA 1.030 1.005 - 1.030    Protein, UA 3+ (A) Negative    Glucose, UA 1+ (A) Negative    Ketones, UA Negative Negative    Bilirubin (UA) 1+ (A) Negative    Occult Blood UA Trace  (A) Negative    Nitrite, UA Negative Negative    Urobilinogen, UA >=8.0 (A) <2.0 EU/dL    Leukocytes, UA Trace (A) Negative   Urinalysis Microscopic    Collection Time: 10/01/23  6:51 PM   Result Value Ref Range    RBC, UA 8 (H) 0 - 4 /hpf    WBC, UA 23 (H) 0 - 5 /hpf    Bacteria Occasional None-Occ /hpf    Hyaline Casts, UA 31 (A) 0-1/lpf /lpf    Granular Casts, UA 18 (A) None /lpf    Amorphous, UA Occasional None-Moderate    Microscopic Comment SEE COMMENT    Urine culture    Collection Time: 10/01/23  6:51 PM    Specimen: Urine   Result Value Ref Range    Urine Culture, Routine No growth    Lactic acid, plasma #2    Collection Time: 10/01/23 10:12 PM   Result Value Ref Range    Lactate (Lactic Acid) 3.3 (H) 0.5 - 2.2 mmol/L   Lactic acid, plasma    Collection Time: 10/02/23  2:11 AM   Result Value Ref Range    Lactate (Lactic Acid) 1.6 0.5 - 2.2 mmol/L   Basic Metabolic Panel    Collection Time: 10/02/23  9:17 AM   Result Value Ref Range    Sodium 138 136 - 145 mmol/L    Potassium 3.5 3.5 - 5.1 mmol/L    Chloride 102 95 - 110 mmol/L    CO2 24 23 - 29 mmol/L    Glucose 98 70 - 110 mg/dL    BUN 22 8 - 23 mg/dL    Creatinine 2.4 (H) 0.5 - 1.4 mg/dL    Calcium 7.9 (L) 8.7 - 10.5 mg/dL    Anion Gap 12 8 - 16 mmol/L    eGFR 22 (A) >60 mL/min/1.73 m^2   CBC Auto Differential    Collection Time: 10/02/23  9:18 AM   Result Value Ref Range    WBC 10.59 3.90 - 12.70 K/uL    RBC 3.75 (L) 4.00 - 5.40 M/uL    Hemoglobin 11.2 (L) 12.0 - 16.0 g/dL    Hematocrit 35.0 (L) 37.0 - 48.5 %    MCV 93 82 - 98 fL    MCH 29.9 27.0 - 31.0 pg    MCHC 32.0 32.0 - 36.0 g/dL    RDW 14.1 11.5 - 14.5 %    Platelets 235 150 - 450 K/uL    MPV 9.3 9.2 - 12.9 fL    Immature Granulocytes 0.3 0.0 - 0.5 %    Gran # (ANC) 7.4 1.8 - 7.7 K/uL    Immature Grans (Abs) 0.03 0.00 - 0.04 K/uL    Lymph # 2.0 1.0 - 4.8 K/uL    Mono # 0.7 0.3 - 1.0 K/uL    Eos # 0.5 0.0 - 0.5 K/uL    Baso # 0.04 0.00 - 0.20 K/uL    nRBC 0 0 /100 WBC    Gran % 70.0 38.0 - 73.0 %     Lymph % 18.5 18.0 - 48.0 %    Mono % 6.6 4.0 - 15.0 %    Eosinophil % 4.2 0.0 - 8.0 %    Basophil % 0.4 0.0 - 1.9 %    Differential Method Automated    POCT glucose    Collection Time: 10/02/23  1:03 PM   Result Value Ref Range    POCT Glucose 86 70 - 110 mg/dL   POCT glucose    Collection Time: 10/02/23  5:25 PM   Result Value Ref Range    POCT Glucose 64 (L) 70 - 110 mg/dL   POCT glucose    Collection Time: 10/02/23  9:36 PM   Result Value Ref Range    POCT Glucose 47 (LL) 70 - 110 mg/dL   POCT glucose    Collection Time: 10/02/23 10:23 PM   Result Value Ref Range    POCT Glucose 115 (H) 70 - 110 mg/dL   POCT glucose    Collection Time: 10/03/23  4:00 AM   Result Value Ref Range    POCT Glucose 39 (LL) 70 - 110 mg/dL   POCT glucose    Collection Time: 10/03/23  5:07 AM   Result Value Ref Range    POCT Glucose 111 (H) 70 - 110 mg/dL   CBC auto differential    Collection Time: 10/03/23  8:16 AM   Result Value Ref Range    WBC 5.36 3.90 - 12.70 K/uL    RBC 3.55 (L) 4.00 - 5.40 M/uL    Hemoglobin 10.5 (L) 12.0 - 16.0 g/dL    Hematocrit 33.3 (L) 37.0 - 48.5 %    MCV 94 82 - 98 fL    MCH 29.6 27.0 - 31.0 pg    MCHC 31.5 (L) 32.0 - 36.0 g/dL    RDW 13.7 11.5 - 14.5 %    Platelets 180 150 - 450 K/uL    MPV 9.2 9.2 - 12.9 fL    Immature Granulocytes 0.4 0.0 - 0.5 %    Gran # (ANC) 3.7 1.8 - 7.7 K/uL    Immature Grans (Abs) 0.02 0.00 - 0.04 K/uL    Lymph # 0.9 (L) 1.0 - 4.8 K/uL    Mono # 0.3 0.3 - 1.0 K/uL    Eos # 0.4 0.0 - 0.5 K/uL    Baso # 0.02 0.00 - 0.20 K/uL    nRBC 0 0 /100 WBC    Gran % 68.7 38.0 - 73.0 %    Lymph % 16.6 (L) 18.0 - 48.0 %    Mono % 6.3 4.0 - 15.0 %    Eosinophil % 7.6 0.0 - 8.0 %    Basophil % 0.4 0.0 - 1.9 %    Differential Method Automated    Basic metabolic panel    Collection Time: 10/03/23  8:16 AM   Result Value Ref Range    Sodium 144 136 - 145 mmol/L    Potassium 3.6 3.5 - 5.1 mmol/L    Chloride 105 95 - 110 mmol/L    CO2 26 23 - 29 mmol/L    Glucose 92 70 - 110 mg/dL    BUN 19 8 - 23  mg/dL    Creatinine 1.9 (H) 0.5 - 1.4 mg/dL    Calcium 8.8 8.7 - 10.5 mg/dL    Anion Gap 13 8 - 16 mmol/L    eGFR 29 (A) >60 mL/min/1.73 m^2        Body mass index is 45.86 kg/m².    Physical Exam  Constitutional:       Appearance: She is obese.   Cardiovascular:      Rate and Rhythm: Normal rate and regular rhythm.      Pulses: Normal pulses.      Heart sounds: Normal heart sounds. No murmur heard.     No friction rub.   Pulmonary:      Effort: Pulmonary effort is normal. No respiratory distress.      Breath sounds: Normal breath sounds. No stridor. No wheezing.   Abdominal:      Palpations: Abdomen is soft.      Tenderness: There is no abdominal tenderness. There is no guarding or rebound.   Skin:     General: Skin is warm and dry.   Neurological:      Mental Status: She is alert and oriented to person, place, and time.           Assessment and Plan   Current Problems List:  Active Hospital Problems    Diagnosis  POA    *Enteritis [K52.9]  Yes    Leukocytosis [D72.829]  Unknown    Elevated lactic acid level [R79.89]  Unknown    DALIA (acute kidney injury) [N17.9]  Unknown    Acute cystitis with hematuria [N30.01]  Unknown    Chronic low back pain [M54.50, G89.29]  Unknown    Accidental overdose [T50.901A]  Unknown    Morbid obesity [E66.01]  Yes    Type 2 diabetes mellitus with hyperglycemia, without long-term current use of insulin [E11.65]  Yes     Last Assessment & Plan:   Formatting of this note might be different from the original.  History & Physical   Will monitor Accu-Cheks.  Consideration for insulin coverage pending clinical course, blood glucose trend.  Discharge Summary   No acute issues.  Hold Metformin for now in light of elevated creatinine.  Follow-up Accuchecks stable. Will continue to monitor, will plan to cover with insulin if indicated.      Mixed hyperlipidemia [E78.2]  Yes     Last Assessment & Plan:   Formatting of this note might be different from the original.  History & Physical   Hold  statin therapy in light of acute illness, nausea/vomiting.  Discharge Summary   Hold statin for now.  Can likely resume in the near future when her GI symptoms have improved.  Follow-up      Essential hypertension [I10]  Yes     Last Assessment & Plan:   Formatting of this note is different from the original.  History & Physical   All blood pressure medications for now in light of acute illness.  Will monitor and adjust/resume as appropriate.  Discharge Summary   Hold blood pressure medications in light of otherwise low-normal blood pressure, DALIA.   Follow-up Hold blood pressure medications in light of otherwise low-normal blood pressure, DALIA.  We will continue to monitor and adjust/resume as appropriate.        Resolved Hospital Problems   No resolved problems to display.     * Enteritis (per surgery recs)  62yo F with enteritis/colitis vs pSBO     - No acute surgical intervention required at this time  - patient not clinically obstructed and not showing symptoms of partial obstruction. Patient with ongoing bowel function, no vomiting and no abdominal distention. Exam and history more consistent with enteritis/colitis or pseudo-obstruction from narcotic abuse  - would give PO challenge and monitor for tolerance  - hold narcotics if able     Constipation (Chronic)     - Possibly refer to a new outpatient GI doctor. She feels her current provider has not done enough to address her constipation.      Accidental overdose  Received 3 doses of 0.4 mg Narcan with response.  Excessive sedation likely a result from taking pain medication in setting of IVVD. Currently voices no complaints at this time.   Plan:  -hold narcotics   -tele monitoring  -monitor vitals  -supplemental O2 as needed  -additional narcan if needed     Chronic low back pain  Compliant with home meds.  Plan:  -continue lyrica  -hold narcotics due to accidental overdose        Acute cystitis with hematuria  Urinalysis revealed:         Lab Results    Component Value Date     COLORU Orange (A) 10/01/2023     APPEARANCEUA Cloudy (A) 10/01/2023     SPECGRAV 1.030 10/01/2023     PHUR 6.0 10/01/2023     PROTEINUA 3+ (A) 10/01/2023     GLUCUA 1+ (A) 10/01/2023     KETONESU Negative 10/01/2023     NITRITE Negative 10/01/2023     UROBILINOGEN >=8.0 (A) 10/01/2023     BILIRUBINUA 1+ (A) 10/01/2023     LEUKOCYTESUR Trace (A) 10/01/2023     RBCUA 8 (H) 10/01/2023     WBCUA 23 (H) 10/01/2023     BACTERIA Occasional 10/01/2023     HYALINECASTS 31 (A) 10/01/2023   Received vanco and cefepime  Plan:  -UA culture pending  -Continue Abx     10/2:  Cont cefepime        DALIA (acute kidney injury)  Patient with acute kidney injury likely d/t IVVD/Dehydration. DALIA is currently being treated. Labs reviewed- Renal function/electrolytes with Estimated Creatinine Clearance: 31.9 mL/min (A) (based on SCr of 2.4 mg/dL (H)). according to latest data. Monitor urine output and serial BMP and adjust therapy as needed. Avoid nephrotoxins and renally dose meds for GFR listed above.      Cont IVF     Elevated lactic acid level  See above        Leukocytosis  Possibly reactive  Will cont IVF  Empiric cefepime for now            Mixed hyperlipidemia  Patient is chronically on statin.will not continue for now. Last Lipid Panel:         Lab Results   Component Value Date     CHOL 119 03/29/2023     HDL 48 03/29/2023     LDLCALC 51 03/29/2023     TRIG 110 03/29/2023     CHOLHDL 31.3 12/12/2012      Plan:  -Continue home medication  -low fat/low calorie diet           Type 2 diabetes mellitus with hyperglycemia, without long-term current use of insulin  Most recent A1c measuring         Hemoglobin A1C   Date Value Ref Range Status   02/23/2020 6.0 <=6.5 % Final   04/11/2013 6.2 4.0 - 6.2 % Final      Plan:  -SSI  -Accu-checks   -Hypoglycemic protocol   -Hold oral antihyperglycemics while inpatient               Essential hypertension  Currently normotensive. BP usually well controlled per patient  with home medications.  Plan:  -Optimize pain control   -Hold home medications (lisinopril, hctz) due to DALIA, titrate as needed   -Monitor BP  -Low salt/cardiac diet when not NPO  -IV hydralazine prn for SBP>160 or DBP>90                     VTE Risk Mitigation (From admission, onward)           Ordered       Reason for No Pharmacological VTE Prophylaxis  Once        Question:  Reasons:  Answer:  Physician Provided (leave comment)    10/01/23 2342       IP VTE HIGH RISK PATIENT  Once         10/01/23 2342       Place sequential compression device  Until discontinued         10/01/23 2342                    Discharge Planning   ANDRÉS:      Code Status: Full Code   Is the patient medically ready for discharge?:     Reason for patient still in hospital (select all that apply): Patient trending condition  Discharge Plan A: Home with family           Corey Chappelledwin MS3  [facility]

## 2023-10-03 NOTE — ASSESSMENT & PLAN NOTE
Patient with acute kidney injury likely d/t IVVD/Dehydration. DALIA is currently being treated. Labs reviewed- Renal function/electrolytes with Estimated Creatinine Clearance: 40.3 mL/min (A) (based on SCr of 1.9 mg/dL (H)). according to latest data. Monitor urine output and serial BMP and adjust therapy as needed. Avoid nephrotoxins and renally dose meds for GFR listed above.     Cont IVF

## 2023-10-03 NOTE — HPI
64yo F with a PMHx significant for HTN, DM, chronic back pain and chronic narcotic use who presented to the ED for dizziness, syncope and a large bowel movement. Workup in ED positive for narcotic overdose requiring Narcan, leukocytosis of 16k, creatinine of 2.5, lactic acidosis of 3.4, +UA and CT showing thickened small bowel with dilation but no transition point with associated mesenteric edema and thickening of the mucosa of the transverse and right colon concerning for enteritis/colitis. Patient admitted to hospital medicine and surgery consulted for evaluation as CT read concerning for possible pSBO. Patient endorses chronic constipation and abdominal pain, nausea but no emesis. Reports last BM and flatus were yesterday. Only previous abdominal surgical history of tubal ligation and robotic hysterectomy.

## 2023-10-03 NOTE — ASSESSMENT & PLAN NOTE
Body mass index is 45.86 kg/m². Morbid obesity complicates all aspects of disease management from diagnostic modalities to treatment. Weight loss encouraged and health benefits explained to patient.

## 2023-10-03 NOTE — SUBJECTIVE & OBJECTIVE
No current facility-administered medications on file prior to encounter.     Current Outpatient Medications on File Prior to Encounter   Medication Sig    aspirin 81 MG Chew Take 81 mg by mouth.    cetirizine (ZYRTEC) 10 MG tablet Take 10 mg by mouth once daily.    FARXIGA 5 mg Tab tablet Take 5 mg by mouth every morning.    hydroCHLOROthiazide (HYDRODIURIL) 25 MG tablet Take 1 tablet (25 mg total) by mouth once daily.    hyoscyamine (ANASPAZ,LEVSIN) 0.125 mg Tab Take 125 mcg by mouth 4 (four) times daily.    linaCLOtide (LINZESS) 72 mcg Cap capsule Take 72 mcg by mouth before breakfast.    lisinopriL (PRINIVIL,ZESTRIL) 5 MG tablet Take 5 mg by mouth once daily.    metFORMIN (GLUCOPHAGE) 500 MG tablet Take with meals. Take 1 qam for 1 wk, then bid for 1 wk, then 2 am/1 pm for 1 wk, then 2 bid till finished.    metoclopramide HCl (REGLAN) 10 MG tablet Take 1 tablet (10 mg total) by mouth every 6 (six) hours.    MOUNJARO 5 mg/0.5 mL PnIj Inject 5 mg into the skin once a week.    ondansetron (ZOFRAN-ODT) 4 MG TbDL     oxyCODONE-acetaminophen (PERCOCET)  mg per tablet Take 1 tablet by mouth.    pantoprazole (PROTONIX) 40 MG tablet Take 40 mg by mouth once daily.    pregabalin (LYRICA) 75 MG capsule Take 75 mg by mouth 2 (two) times daily.    rosuvastatin (CRESTOR) 20 MG tablet Take 20 mg by mouth nightly.    fluticasone propionate (FLONASE) 50 mcg/actuation nasal spray fluticasone propionate 50 mcg/actuation nasal spray,suspension   Spray 2 sprays every day by intranasal route.       Review of patient's allergies indicates:  No Known Allergies    Past Medical History:   Diagnosis Date    Chronic back pain     Diabetes mellitus, type 2     Hypertension      Past Surgical History:   Procedure Laterality Date    ANKLE SURGERY      right    BACK SURGERY      HYSTERECTOMY      TONSILLECTOMY      TOTAL HIP ARTHROPLASTY Left     TUBAL LIGATION       Family History    None       Tobacco Use    Smoking status: Never     Smokeless tobacco: Never   Substance and Sexual Activity    Alcohol use: No    Drug use: No    Sexual activity: Not on file     Review of Systems   Constitutional:  Positive for fatigue. Negative for activity change, appetite change, chills, fever and unexpected weight change.   HENT:  Negative for congestion, ear pain, sore throat and trouble swallowing.    Eyes:  Negative for pain, redness and itching.   Respiratory:  Negative for cough, shortness of breath and wheezing.    Cardiovascular:  Negative for chest pain, palpitations and leg swelling.   Gastrointestinal:  Positive for abdominal pain, constipation and nausea. Negative for abdominal distention, anal bleeding, blood in stool, diarrhea, rectal pain and vomiting.   Endocrine: Negative for cold intolerance, heat intolerance and polyuria.   Genitourinary:  Negative for dysuria, flank pain, frequency and hematuria.   Musculoskeletal:  Negative for gait problem, joint swelling and neck pain.   Skin:  Negative for color change, rash and wound.   Allergic/Immunologic: Negative for environmental allergies and immunocompromised state.   Neurological:  Negative for dizziness, speech difficulty, weakness and numbness.   Psychiatric/Behavioral:  Negative for agitation, confusion and hallucinations.      Objective:     Vital Signs (Most Recent):  Temp: 97.9 °F (36.6 °C) (10/02/23 1925)  Pulse: (!) 58 (10/02/23 1925)  Resp: 16 (10/02/23 1925)  BP: (!) 94/55 (10/02/23 1925)  SpO2: 98 % (10/02/23 1925) Vital Signs (24h Range):  Temp:  [97.4 °F (36.3 °C)-98.7 °F (37.1 °C)] 97.9 °F (36.6 °C)  Pulse:  [58-92] 58  Resp:  [11-20] 16  SpO2:  [92 %-100 %] 98 %  BP: ()/(47-56) 94/55     Weight: 125 kg (275 lb 9.2 oz)  Body mass index is 45.86 kg/m².     Physical Exam  Constitutional:       Appearance: She is well-developed. She is obese.   HENT:      Head: Normocephalic and atraumatic.   Eyes:      Conjunctiva/sclera: Conjunctivae normal.   Neck:      Thyroid: No  thyromegaly.   Cardiovascular:      Rate and Rhythm: Bradycardia present.   Pulmonary:      Effort: Pulmonary effort is normal. No respiratory distress.   Abdominal:      Comments: Soft, nondistended, very mild global TTP; no rebound or guarding; no tympany; well-healed previous port sites   Musculoskeletal:         General: No tenderness. Normal range of motion.      Cervical back: Normal range of motion.   Skin:     General: Skin is warm and dry.      Capillary Refill: Capillary refill takes less than 2 seconds.      Findings: No rash.   Neurological:      Mental Status: She is alert and oriented to person, place, and time.            I have reviewed all pertinent lab results within the past 24 hours.  CBC:   Recent Labs   Lab 10/02/23  0918   WBC 10.59   RBC 3.75*   HGB 11.2*   HCT 35.0*      MCV 93   MCH 29.9   MCHC 32.0     BMP:   Recent Labs   Lab 10/01/23  1803 10/02/23  0917   * 98    138   K 3.5 3.5    102   CO2 24 24   BUN 19 22   CREATININE 2.5* 2.4*   CALCIUM 9.1 7.9*   MG 1.6  --      CMP:   Recent Labs   Lab 10/01/23  1803 10/02/23  0917   * 98   CALCIUM 9.1 7.9*   ALBUMIN 3.4*  --    PROT 7.3  --     138   K 3.5 3.5   CO2 24 24    102   BUN 19 22   CREATININE 2.5* 2.4*   ALKPHOS 108  --    ALT 5*  --    AST 10  --    BILITOT 1.1*  --      LFTs:   Recent Labs   Lab 10/01/23  1803   ALT 5*   AST 10   ALKPHOS 108   BILITOT 1.1*   PROT 7.3   ALBUMIN 3.4*       Significant Diagnostics:  I have reviewed all pertinent imaging results/findings within the past 24 hours.    CT:  FINDINGS:  Heart: Normal in size as far as seen.  No pericardial effusion as far seen.     Lung Bases: Well aerated, without consolidation or pleural fluid.     Liver: Hepatomegaly.     Gallbladder: Status post cholecystectomy.     Bile Ducts: No evidence of dilated ducts.     Pancreas: No mass or peripancreatic fat stranding.     Spleen: Unremarkable.     Adrenals: Unremarkable.     Kidneys/  Ureters: Hyperdense nodule involving the inferior pole of the right kidney measuring 14 mm.  Recommend follow-up MRI for further evaluation.  Cystic lesion of the superior pole of the kidney measuring 5 cm.     Bladder: No evidence of wall thickening.     Reproductive organs: Unremarkable.     GI Tract/Mesentery: Prominent small bowel loops in the pelvis measuring up to 33 mm in diameter with mild mucosal thickening suggestive of enteritis and partial small bowel obstruction.  Mild mesenteric edema.  Colonic loops of bowel are decompressed.  Mild mucosal thickening of the transverse and right colon.     Peritoneal Space: Mild ascites.     Retroperitoneum: No significant adenopathy.     Abdominal wall: Right-sided stimulator device in the buttocks region.  Postsurgical changes of the posterior lumbar subcutaneous fat with surgical scar and linear fluid density.     Vasculature: No aneurysm.  Atherosclerotic changes.     Bones: No acute fracture.  Spinal hardware identified with posterior fixation at S1 L5 L4 L3-L2.  Left hip prosthesis     Impression:     Hyperdense nodule involving the inferior pole of the right kidney measuring 14 mm. Recommend follow-up MRI with contrast for further evaluation.  Cystic lesion of the superior pole of the kidney measuring 5 cm.     Mild ascites.     Prominent small bowel loops in the pelvis measuring up to 33 mm in diameter with mild mucosal thickening suggestive of enteritis and partial small bowel obstruction. Mild associated mesenteric edema. Colonic loops of bowel are decompressed. Mild mucosal thickening of the transverse and right colon.  Correlate clinically for colitis.

## 2023-10-03 NOTE — SUBJECTIVE & OBJECTIVE
Interval History:     Review of Systems   Constitutional:  Positive for fatigue. Negative for activity change, appetite change, chills, fever and unexpected weight change.   HENT:  Negative for congestion, ear pain, sore throat and trouble swallowing.    Eyes:  Negative for pain, redness and itching.   Respiratory:  Negative for cough, shortness of breath and wheezing.    Cardiovascular:  Negative for chest pain, palpitations and leg swelling.   Gastrointestinal:  Positive for abdominal pain, constipation and nausea. Negative for abdominal distention, anal bleeding, blood in stool, diarrhea, rectal pain and vomiting.   Endocrine: Negative for cold intolerance, heat intolerance and polyuria.   Genitourinary:  Negative for dysuria, flank pain, frequency and hematuria.   Musculoskeletal:  Negative for gait problem, joint swelling and neck pain.   Skin:  Negative for color change, rash and wound.   Allergic/Immunologic: Negative for environmental allergies and immunocompromised state.   Neurological:  Negative for dizziness, speech difficulty, weakness and numbness.   Psychiatric/Behavioral:  Negative for agitation, confusion and hallucinations.      Objective:     Vital Signs (Most Recent):  Temp: 97.6 °F (36.4 °C) (10/03/23 1224)  Pulse: 72 (10/03/23 1224)  Resp: 20 (10/03/23 1224)  BP: (!) 113/58 (10/03/23 1224)  SpO2: 95 % (10/03/23 1224) Vital Signs (24h Range):  Temp:  [97.4 °F (36.3 °C)-98 °F (36.7 °C)] 97.6 °F (36.4 °C)  Pulse:  [58-72] 72  Resp:  [16-20] 20  SpO2:  [91 %-98 %] 95 %  BP: ()/(47-58) 113/58     Weight: 125 kg (275 lb 9.2 oz)  Body mass index is 45.86 kg/m².    Intake/Output Summary (Last 24 hours) at 10/3/2023 1603  Last data filed at 10/3/2023 1451  Gross per 24 hour   Intake 4009.24 ml   Output 1500 ml   Net 2509.24 ml         Physical Exam  Constitutional:       Appearance: She is well-developed. She is obese.   HENT:      Head: Normocephalic and atraumatic.   Eyes:       Conjunctiva/sclera: Conjunctivae normal.   Neck:      Thyroid: No thyromegaly.   Cardiovascular:      Rate and Rhythm: Normal rate.   Pulmonary:      Effort: Pulmonary effort is normal. No respiratory distress.   Abdominal:      Comments: Soft, nondistended, nontender; no rebound or guarding; no tympany; well-healed previous port sites   Musculoskeletal:         General: No tenderness. Normal range of motion.      Cervical back: Normal range of motion.   Skin:     General: Skin is warm and dry.      Capillary Refill: Capillary refill takes less than 2 seconds.      Findings: No rash.   Neurological:      Mental Status: She is alert and oriented to person, place, and time.             Significant Labs: All pertinent labs within the past 24 hours have been reviewed.  Recent Lab Results  (Last 5 results in the past 24 hours)        10/03/23  1317   10/03/23  1303   10/03/23  1248   10/03/23  0816   10/03/23  0507        Anion Gap       13         Baso #       0.02         Basophil %       0.4         BUN       19         Calcium       8.8         Chloride       105         CO2       26         Creatinine       1.9         Differential Method       Automated         eGFR       29         Eos #       0.4         Eosinophil %       7.6         Glucose       92         Gran # (ANC)       3.7         Gran %       68.7         Hematocrit       33.3         Hemoglobin       10.5         Immature Grans (Abs)       0.02  Comment: Mild elevation in immature granulocytes is non specific and   can be seen in a variety of conditions including stress response,   acute inflammation, trauma and pregnancy. Correlation with other   laboratory and clinical findings is essential.           Immature Granulocytes       0.4         Lymph #       0.9         Lymph %       16.6         MCH       29.6         MCHC       31.5         MCV       94         Mono #       0.3         Mono %       6.3         MPV       9.2         nRBC       0          Platelet Count       180         POCT Glucose 140   62   58     111       Potassium       3.6         RBC       3.55         RDW       13.7         Sodium       144         WBC       5.36                                Significant Imaging: I have reviewed all pertinent imaging results/findings within the past 24 hours.

## 2023-10-03 NOTE — PROGRESS NOTES
[facility]  General Surgery  Progress Note    Subjective:     Interval History: No acute events overnight. No nausea. Feels hungry.    Medications:  Continuous Infusions:   lactated ringers 125 mL/hr at 10/02/23 2128     Scheduled Meds:   aluminum-magnesium hydroxide-simethicone  30 mL Oral QID (AC & HS)    ceFEPime (MAXIPIME) IVPB  2 g Intravenous Q12H    pregabalin  75 mg Oral BID    sucralfate  1 g Oral Q6H     PRN Meds:acetaminophen, acetaminophen, albuterol-ipratropium, aluminum-magnesium hydroxide-simethicone, dextrose 10%, dextrose 10%, glucagon (human recombinant), glucose, glucose, hydrALAZINE, HYDROcodone-acetaminophen, insulin aspart U-100, melatonin, morphine, naloxone, polyethylene glycol, prochlorperazine, simethicone, sodium chloride 0.9%     Review of patient's allergies indicates:  No Known Allergies  Objective:     Vital Signs (Most Recent):  Temp: 97.9 °F (36.6 °C) (10/03/23 0348)  Pulse: 69 (10/03/23 0500)  Resp: 18 (10/03/23 0348)  BP: (!) 101/55 (10/03/23 0348)  SpO2: (!) 92 % (10/03/23 0348) Vital Signs (24h Range):  Temp:  [97.4 °F (36.3 °C)-98.7 °F (37.1 °C)] 97.9 °F (36.6 °C)  Pulse:  [58-88] 69  Resp:  [16-18] 18  SpO2:  [92 %-98 %] 92 %  BP: ()/(47-55) 101/55     Weight: 125 kg (275 lb 9.2 oz)  Body mass index is 45.86 kg/m².    Intake/Output - Last 3 Shifts         10/01 0700  10/02 0659 10/02 0700  10/03 0659 10/03 0700  10/04 0659    P.O. 0      IV Piggyback 4138      Total Intake(mL/kg) 4138 (33.1)      Urine (mL/kg/hr)  1500 (0.5)     Total Output  1500     Net +4138 -1500            Urine Occurrence  3 x              Physical Exam  Constitutional:       Appearance: She is well-developed. She is obese.   HENT:      Head: Normocephalic and atraumatic.   Eyes:      Conjunctiva/sclera: Conjunctivae normal.   Neck:      Thyroid: No thyromegaly.   Cardiovascular:      Rate and Rhythm: Normal rate.   Pulmonary:      Effort: Pulmonary effort is normal. No respiratory  distress.   Abdominal:      Comments: Soft, nondistended, nontender; no rebound or guarding; no tympany; well-healed previous port sites   Musculoskeletal:         General: No tenderness. Normal range of motion.      Cervical back: Normal range of motion.   Skin:     General: Skin is warm and dry.      Capillary Refill: Capillary refill takes less than 2 seconds.      Findings: No rash.   Neurological:      Mental Status: She is alert and oriented to person, place, and time.          Significant Labs:  I have reviewed all pertinent lab results within the past 24 hours.  CBC:   Recent Labs   Lab 10/02/23  0918   WBC 10.59   RBC 3.75*   HGB 11.2*   HCT 35.0*      MCV 93   MCH 29.9   MCHC 32.0     BMP:   Recent Labs   Lab 10/01/23  1803 10/02/23  0917   * 98    138   K 3.5 3.5    102   CO2 24 24   BUN 19 22   CREATININE 2.5* 2.4*   CALCIUM 9.1 7.9*   MG 1.6  --      CMP:   Recent Labs   Lab 10/01/23  1803 10/02/23  0917   * 98   CALCIUM 9.1 7.9*   ALBUMIN 3.4*  --    PROT 7.3  --     138   K 3.5 3.5   CO2 24 24    102   BUN 19 22   CREATININE 2.5* 2.4*   ALKPHOS 108  --    ALT 5*  --    AST 10  --    BILITOT 1.1*  --      LFTs:   Recent Labs   Lab 10/01/23  1803   ALT 5*   AST 10   ALKPHOS 108   BILITOT 1.1*   PROT 7.3   ALBUMIN 3.4*       Significant Diagnostics:  I have reviewed all pertinent imaging results/findings within the past 24 hours.    KUB yesterday:  FINDINGS:  Mild constipation.  Nonobstructive bowel gas pattern is noted. No radiopaque kidney calculus is identified.  Stimulating device overlies the right pelvis.  No evidence of organomegaly.  The bones demonstrate moderate degenerative changes within the lower lumbar region.  Postoperative changes seen within the lumbar spine.  Left hip replacement without dislocation.  Diffuse osteopenia the bones are otherwise intact.     Impression:     Nonobstructive bowel gas pattern.    Assessment/Plan:     *  Enteritis  64yo F with enteritis/colitis vs pSBO    - No acute surgical intervention required at this time  - patient not clinically obstructed and not showing symptoms of partial obstruction. Patient with ongoing bowel function, no vomiting and no abdominal distention. Exam and history more consistent with enteritis/colitis or pseudo-obstruction from narcotic abuse  - trial of clear liquids  - hold narcotics if able    Accidental overdose  Care per hospital medicine    Chronic low back pain  Care per hospital medicine    Acute cystitis with hematuria  Care per hospital medicine    DALIA (acute kidney injury)  Care per hospital medicine    Elevated lactic acid level  Resolved    Leukocytosis  Resolved    Mixed hyperlipidemia  Care per hospital medicine    Morbid obesity  Encourage weight loss    Type 2 diabetes mellitus with hyperglycemia, without long-term current use of insulin  Care per hospital medicine    Essential hypertension  Care per hospital medicine      Enrique Foy MD  General Surgery  [facility]

## 2023-10-03 NOTE — PROGRESS NOTES
Pharmacist Renal Dose Adjustment Note    Roslyn Conde is a 63 y.o. female being treated with the medication cipro     Patient Data:    Vital Signs (Most Recent):  Temp: 97.6 °F (36.4 °C) (10/03/23 1224)  Pulse: 72 (10/03/23 1224)  Resp: 20 (10/03/23 1224)  BP: (!) 113/58 (10/03/23 1224)  SpO2: 95 % (10/03/23 1224) Vital Signs (72h Range):  Temp:  [97.4 °F (36.3 °C)-98.7 °F (37.1 °C)]   Pulse:  []   Resp:  [10-20]   BP: ()/(27-79)   SpO2:  [90 %-100 %]      Recent Labs   Lab 10/01/23  1803 10/02/23  0917 10/03/23  0816   CREATININE 2.5* 2.4* 1.9*     Serum creatinine: 1.9 mg/dL (H) 10/03/23 0816  Estimated creatinine clearance: 40.3 mL/min (A)    Medication:cipro 500mg BID will be changed to cipro 750mg BID for crcl >30ml/min    Pharmacist's Name: Della Haile  Pharmacist's Extension: [phone]

## 2023-10-03 NOTE — ASSESSMENT & PLAN NOTE
64yo F with enteritis/colitis vs pSBO    - No acute surgical intervention required at this time  - patient not clinically obstructed and not showing symptoms of partial obstruction. Patient with ongoing bowel function, no vomiting and no abdominal distention. Exam and history more consistent with enteritis/colitis or pseudo-obstruction from narcotic abuse  - would give PO challenge and monitor for tolerance  - hold narcotics if able

## 2023-10-04 LAB
ANION GAP SERPL CALC-SCNC: 12 MMOL/L (ref 8–16)
BASOPHILS # BLD AUTO: 0.02 K/UL (ref 0–0.2)
BASOPHILS NFR BLD: 0.5 % (ref 0–1.9)
BUN SERPL-MCNC: 11 MG/DL (ref 8–23)
CALCIUM SERPL-MCNC: 8.7 MG/DL (ref 8.7–10.5)
CHLORIDE SERPL-SCNC: 103 MMOL/L (ref 95–110)
CO2 SERPL-SCNC: 25 MMOL/L (ref 23–29)
CREAT SERPL-MCNC: 1.4 MG/DL (ref 0.5–1.4)
DIFFERENTIAL METHOD: ABNORMAL
EOSINOPHIL # BLD AUTO: 0.4 K/UL (ref 0–0.5)
EOSINOPHIL NFR BLD: 8.1 % (ref 0–8)
ERYTHROCYTE [DISTWIDTH] IN BLOOD BY AUTOMATED COUNT: 13.5 % (ref 11.5–14.5)
EST. GFR  (NO RACE VARIABLE): 42 ML/MIN/1.73 M^2
GLUCOSE SERPL-MCNC: 129 MG/DL (ref 70–110)
HCT VFR BLD AUTO: 33.7 % (ref 37–48.5)
HGB BLD-MCNC: 10.5 G/DL (ref 12–16)
IMM GRANULOCYTES # BLD AUTO: 0.01 K/UL (ref 0–0.04)
IMM GRANULOCYTES NFR BLD AUTO: 0.2 % (ref 0–0.5)
LYMPHOCYTES # BLD AUTO: 1 K/UL (ref 1–4.8)
LYMPHOCYTES NFR BLD: 21.9 % (ref 18–48)
MAGNESIUM SERPL-MCNC: 1.7 MG/DL (ref 1.6–2.6)
MCH RBC QN AUTO: 29.6 PG (ref 27–31)
MCHC RBC AUTO-ENTMCNC: 31.2 G/DL (ref 32–36)
MCV RBC AUTO: 95 FL (ref 82–98)
MONOCYTES # BLD AUTO: 0.4 K/UL (ref 0.3–1)
MONOCYTES NFR BLD: 9.3 % (ref 4–15)
NEUTROPHILS # BLD AUTO: 2.7 K/UL (ref 1.8–7.7)
NEUTROPHILS NFR BLD: 60 % (ref 38–73)
NRBC BLD-RTO: 0 /100 WBC
PLATELET # BLD AUTO: 155 K/UL (ref 150–450)
PMV BLD AUTO: 9.4 FL (ref 9.2–12.9)
POCT GLUCOSE: 100 MG/DL (ref 70–110)
POCT GLUCOSE: 105 MG/DL (ref 70–110)
POCT GLUCOSE: 122 MG/DL (ref 70–110)
POCT GLUCOSE: 127 MG/DL (ref 70–110)
POCT GLUCOSE: 51 MG/DL (ref 70–110)
POCT GLUCOSE: 81 MG/DL (ref 70–110)
POCT GLUCOSE: 95 MG/DL (ref 70–110)
POTASSIUM SERPL-SCNC: 3.6 MMOL/L (ref 3.5–5.1)
RBC # BLD AUTO: 3.55 M/UL (ref 4–5.4)
SODIUM SERPL-SCNC: 140 MMOL/L (ref 136–145)
WBC # BLD AUTO: 4.42 K/UL (ref 3.9–12.7)

## 2023-10-04 PROCEDURE — 21400001 HC TELEMETRY ROOM

## 2023-10-04 PROCEDURE — 80048 BASIC METABOLIC PNL TOTAL CA: CPT | Performed by: INTERNAL MEDICINE

## 2023-10-04 PROCEDURE — 36415 COLL VENOUS BLD VENIPUNCTURE: CPT | Performed by: INTERNAL MEDICINE

## 2023-10-04 PROCEDURE — 99900035 HC TECH TIME PER 15 MIN (STAT)

## 2023-10-04 PROCEDURE — 85025 COMPLETE CBC W/AUTO DIFF WBC: CPT | Performed by: INTERNAL MEDICINE

## 2023-10-04 PROCEDURE — 63600175 PHARM REV CODE 636 W HCPCS: Performed by: NURSE PRACTITIONER

## 2023-10-04 PROCEDURE — 25000003 PHARM REV CODE 250: Performed by: INTERNAL MEDICINE

## 2023-10-04 PROCEDURE — 83735 ASSAY OF MAGNESIUM: CPT | Performed by: INTERNAL MEDICINE

## 2023-10-04 PROCEDURE — S5010 5% DEXTROSE AND 0.45% SALINE: HCPCS | Performed by: INTERNAL MEDICINE

## 2023-10-04 PROCEDURE — 25000003 PHARM REV CODE 250: Performed by: NURSE PRACTITIONER

## 2023-10-04 PROCEDURE — 99232 PR SUBSEQUENT HOSPITAL CARE,LEVL II: ICD-10-PCS | Mod: ,,,

## 2023-10-04 PROCEDURE — 99232 SBSQ HOSP IP/OBS MODERATE 35: CPT | Mod: ,,,

## 2023-10-04 RX ORDER — POLYETHYLENE GLYCOL 3350 17 G/17G
17 POWDER, FOR SOLUTION ORAL DAILY
Status: DISCONTINUED | OUTPATIENT
Start: 2023-10-04 | End: 2023-10-06 | Stop reason: HOSPADM

## 2023-10-04 RX ADMIN — CIPROFLOXACIN 750 MG: 750 TABLET ORAL at 08:10

## 2023-10-04 RX ADMIN — ALUMINA, MAGNESIA, AND SIMETHICONE ORAL SUSPENSION REGULAR STRENGTH 30 ML: 1200; 1200; 120 SUSPENSION ORAL at 06:10

## 2023-10-04 RX ADMIN — SUCRALFATE 1 G: 1 SUSPENSION ORAL at 06:10

## 2023-10-04 RX ADMIN — PREGABALIN 75 MG: 75 CAPSULE ORAL at 08:10

## 2023-10-04 RX ADMIN — CEFEPIME 2 G: 2 INJECTION, POWDER, FOR SOLUTION INTRAVENOUS at 08:10

## 2023-10-04 RX ADMIN — METRONIDAZOLE 500 MG: 500 TABLET ORAL at 09:10

## 2023-10-04 RX ADMIN — ACETAMINOPHEN 650 MG: 325 TABLET ORAL at 10:10

## 2023-10-04 RX ADMIN — ALUMINA, MAGNESIA, AND SIMETHICONE ORAL SUSPENSION REGULAR STRENGTH 30 ML: 1200; 1200; 120 SUSPENSION ORAL at 10:10

## 2023-10-04 RX ADMIN — DEXTROSE MONOHYDRATE AND SODIUM CHLORIDE: 5; .45 INJECTION, SOLUTION INTRAVENOUS at 04:10

## 2023-10-04 RX ADMIN — METRONIDAZOLE 500 MG: 500 TABLET ORAL at 01:10

## 2023-10-04 RX ADMIN — ALUMINA, MAGNESIA, AND SIMETHICONE ORAL SUSPENSION REGULAR STRENGTH 30 ML: 1200; 1200; 120 SUSPENSION ORAL at 09:10

## 2023-10-04 RX ADMIN — ALUMINA, MAGNESIA, AND SIMETHICONE ORAL SUSPENSION REGULAR STRENGTH 30 ML: 1200; 1200; 120 SUSPENSION ORAL at 05:10

## 2023-10-04 RX ADMIN — SUCRALFATE 1 G: 1 SUSPENSION ORAL at 11:10

## 2023-10-04 RX ADMIN — PREGABALIN 75 MG: 75 CAPSULE ORAL at 09:10

## 2023-10-04 RX ADMIN — SUCRALFATE 1 G: 1 SUSPENSION ORAL at 05:10

## 2023-10-04 RX ADMIN — POLYETHYLENE GLYCOL 3350 17 G: 17 POWDER, FOR SOLUTION ORAL at 01:10

## 2023-10-04 RX ADMIN — CIPROFLOXACIN 750 MG: 750 TABLET ORAL at 09:10

## 2023-10-04 RX ADMIN — METRONIDAZOLE 500 MG: 500 TABLET ORAL at 06:10

## 2023-10-04 NOTE — ASSESSMENT & PLAN NOTE
62yo F with enteritis/colitis vs pSBO    - No acute surgical intervention required at this time  - patient not clinically obstructed and not showing symptoms of partial obstruction. Patient with ongoing bowel function, no vomiting and no abdominal distention. Exam and history more consistent with enteritis/colitis or pseudo-obstruction from narcotic abuse   - continue liquids this AM. Possibly advance to regular diet later today if tolerating CLD/has further bowel function  - hold narcotics if able  - encouraged OOB, ambulation

## 2023-10-04 NOTE — PROGRESS NOTES
[facility] Medicine  Progress Note    Patient Name: Roslyn Conde  MRN: [id number]  Patient Class: IP- Inpatient   Admission Date: 10/1/2023  Length of Stay: 3 days  Attending Physician: Stephan Keating,*  Primary Care Provider: Jed Munoz MD        Subjective:     Principal Problem:Enteritis        HPI:  Roslyn Conde is a 63 y.o. female with a PMH  has a past medical history of Chronic back pain, Diabetes mellitus, type 2, and Hypertension.  Presented to the ER for evaluation of dizziness with syncopal episode earlier today.  Patient reports she was on the commode when she had a large bowel movement.  Patient states that she was dizzy and felt like she was going to pass out while on the toilet.  Patient stated when she stood up to walk she had loss of consciousness.  Associated symptoms include generalized abdominal pain generalized weakness/fatigue and 1 episode of nonbloody vomitus and sweating.  Patient states that she was sitting pain management for back and knee pain.  Patient states her pain management doctor recently increased her Percocet to 4 times daily urine due to having persistent pain of her her left knee secondary to recent TKA.  Patient received Narcan with improvement of symptoms.  Patient states she takes her medication as prescribed.  Denies previous issues with pain medication.  States her generalized abdominal pain at 0/10.  Denies any other complaints at this time.    ER workup revealed leukocytosis of 16.77, BUN/creatinine of 2.5/21, CBG to 12 mg/dL, lactic acid of 3.4 with repeat level of 3.3 after IVF.  UA positive for cystitis.  All remaining blood work unremarkable.  Patient received 2 g of vancomycin, 1 g of cefepime, 500 cc bolus of LR, sepsis bolus of LR at 2, 538 cc, and 3 doses of 0.4 mg of Narcan.  EKG revealed sinus tachycardia with a ventricular rate of 115 beats per minute with a QT/QTC of 456/630.  CT abdomen and pelvis revealed:[ Prominent  small bowel loops in the pelvis measuring up to 33 mm in diameter with mild mucosal thickening suggestive of enteritis and partial small bowel obstruction. Mild associated mesenteric edema. Colonic loops of bowel are decompressed. Mild mucosal thickening of the transverse and right colon.  Correlate clinically for colitis]. Hospital Medicine consulted to admit patient for small-bowel obstruction.  Patient family at bedside are in agreement with treatment plan.  Patient will be admitted under inpatient status.    PCP: Jed Munoz      Overview/Hospital Course:  10/2: pt reports having bowel movement overnight. Complains of nausea however no vomiting. Will repeat KUB  10/3 She denies nay new complains . She is toelrating liquid diet . Surgery rec no surgical intervention at this time and cot medical tx   10/4 She is tolerating full liquid diet . No BM since admission .  The Blood sugar has been borderline low normal  , IVF changed to  D% 1/2 NS.       Interval History:     Review of Systems   Constitutional:  Positive for fatigue. Negative for activity change, appetite change, chills, fever and unexpected weight change.   HENT:  Negative for congestion, ear pain, sore throat and trouble swallowing.    Eyes:  Negative for pain, redness and itching.   Respiratory:  Negative for cough, shortness of breath and wheezing.    Cardiovascular:  Negative for chest pain, palpitations and leg swelling.   Gastrointestinal:  Positive for constipation. Negative for abdominal distention, abdominal pain, anal bleeding, blood in stool, diarrhea, nausea, rectal pain and vomiting.   Endocrine: Negative for cold intolerance, heat intolerance and polyuria.   Genitourinary:  Negative for dysuria, flank pain, frequency and hematuria.   Musculoskeletal:  Negative for gait problem, joint swelling and neck pain.   Skin:  Negative for color change, rash and wound.   Allergic/Immunologic: Negative for environmental allergies and  immunocompromised state.   Neurological:  Negative for dizziness, speech difficulty, weakness and numbness.   Psychiatric/Behavioral:  Negative for agitation, confusion and hallucinations.      Objective:     Vital Signs (Most Recent):  Temp: 97.5 °F (36.4 °C) (10/04/23 0713)  Pulse: 66 (10/04/23 0713)  Resp: 19 (10/04/23 0713)  BP: 126/60 (10/04/23 0713)  SpO2: 98 % (10/04/23 0713) Vital Signs (24h Range):  Temp:  [97.5 °F (36.4 °C)-98.2 °F (36.8 °C)] 97.5 °F (36.4 °C)  Pulse:  [61-71] 66  Resp:  [16-20] 19  SpO2:  [95 %-98 %] 98 %  BP: ()/(53-63) 126/60     Weight: 125 kg (275 lb 9.2 oz)  Body mass index is 45.86 kg/m².    Intake/Output Summary (Last 24 hours) at 10/4/2023 1409  Last data filed at 10/4/2023 1337  Gross per 24 hour   Intake 2318.5 ml   Output --   Net 2318.5 ml         Physical Exam  Constitutional:       Appearance: She is well-developed. She is obese.   HENT:      Head: Normocephalic and atraumatic.   Eyes:      Conjunctiva/sclera: Conjunctivae normal.   Neck:      Thyroid: No thyromegaly.   Cardiovascular:      Rate and Rhythm: Normal rate.   Pulmonary:      Effort: Pulmonary effort is normal. No respiratory distress.   Abdominal:      Comments: Soft, nondistended, nontender; no rebound or guarding; no tympany; well-healed previous port sites   Musculoskeletal:         General: No tenderness. Normal range of motion.      Cervical back: Normal range of motion.   Skin:     General: Skin is warm and dry.      Capillary Refill: Capillary refill takes less than 2 seconds.      Findings: No rash.   Neurological:      Mental Status: She is alert and oriented to person, place, and time.             Significant Labs: All pertinent labs within the past 24 hours have been reviewed.  Recent Lab Results  (Last 5 results in the past 24 hours)        10/04/23  1135   10/04/23  0753   10/04/23  0608   10/04/23  0300   10/04/23  0027        Anion Gap   12             Baso #   0.02             Basophil %    0.5             BUN   11             Calcium   8.7             Chloride   103             CO2   25             Creatinine   1.4             Differential Method   Automated             eGFR   42             Eos #   0.4             Eosinophil %   8.1             Glucose   129             Gran # (ANC)   2.7             Gran %   60.0             Hematocrit   33.7             Hemoglobin   10.5             Immature Grans (Abs)   0.01  Comment: Mild elevation in immature granulocytes is non specific and   can be seen in a variety of conditions including stress response,   acute inflammation, trauma and pregnancy. Correlation with other   laboratory and clinical findings is essential.               Immature Granulocytes   0.2             Lymph #   1.0             Lymph %   21.9             Magnesium    1.7             MCH   29.6             MCHC   31.2             MCV   95             Mono #   0.4             Mono %   9.3             MPV   9.4             nRBC   0             Platelet Count   155             POCT Glucose 95     81   105   122       Potassium   3.6             RBC   3.55             RDW   13.5             Sodium   140             WBC   4.42                                    Significant Imaging: I have reviewed all pertinent imaging results/findings within the past 24 hours.      Assessment/Plan:      * Enteritis  Leukocytosis of 16.77 with elevated lactic acid level of 3.4 with revealed 3.3.  Plan:  -npo  -ivfs  -analgesics prn  -antiemetics prn  -gensurg consult in am    10/2:  Partial SBO on CT scan  Pt reported having bowel movement overnight  No nausea and vomiting currently  No NG tube in place  Will plan to place NG tube should pt become symptomatic   Surgery consulted on case  Cont NPO  IVF     change IVAB to po       Accidental overdose  Received 3 doses of 0.4 mg Narcan with response.  Excessive sedation likely a result from taking pain medication in setting of IVVD. Currently voices no complaints  at this time.   Plan:  -hold narcotics   -tele monitoring  -monitor vitals  -supplemental O2 as needed  -additional narcan if needed    Chronic low back pain  Compliant with home meds.  Plan:  -continue lyrica  -hold narcotics due to accidental overdose      Acute cystitis with hematuria  Urinalysis revealed:   Lab Results   Component Value Date    COLORU Orange (A) 10/01/2023    APPEARANCEUA Cloudy (A) 10/01/2023    SPECGRAV 1.030 10/01/2023    PHUR 6.0 10/01/2023    PROTEINUA 3+ (A) 10/01/2023    GLUCUA 1+ (A) 10/01/2023    KETONESU Negative 10/01/2023    NITRITE Negative 10/01/2023    UROBILINOGEN >=8.0 (A) 10/01/2023    BILIRUBINUA 1+ (A) 10/01/2023    LEUKOCYTESUR Trace (A) 10/01/2023    RBCUA 8 (H) 10/01/2023    WBCUA 23 (H) 10/01/2023    BACTERIA Occasional 10/01/2023    HYALINECASTS 31 (A) 10/01/2023   Received vanco and cefepime  Plan:  -UA culture  NGTD  -Continue Abx          DALIA (acute kidney injury)  Patient with acute kidney injury likely d/t IVVD/Dehydration. DALIA is currently being treated. Labs reviewed- Renal function/electrolytes with Estimated Creatinine Clearance: 54.7 mL/min (based on SCr of 1.4 mg/dL). according to latest data. Monitor urine output and serial BMP and adjust therapy as needed. Avoid nephrotoxins and renally dose meds for GFR listed above.     Cont IVF  Improving     Elevated lactic acid level  See above      Leukocytosis  Possibly reactive  Will cont IVF  Empiric cefepime for now         Mixed hyperlipidemia  Patient is chronically on statin.will not continue for now. Last Lipid Panel:   Lab Results   Component Value Date    CHOL 119 03/29/2023    HDL 48 03/29/2023    LDLCALC 51 03/29/2023    TRIG 110 03/29/2023    CHOLHDL 31.3 12/12/2012     Plan:  -Continue home medication  -low fat/low calorie diet        Morbid obesity  Body mass index is 45.86 kg/m². Morbid obesity complicates all aspects of disease management from diagnostic modalities to treatment. Weight loss encouraged  and health benefits explained to patient.         Type 2 diabetes mellitus with hyperglycemia, without long-term current use of insulin  Most recent A1c measuring   Hemoglobin A1C   Date Value Ref Range Status   02/23/2020 6.0 <=6.5 % Final   04/11/2013 6.2 4.0 - 6.2 % Final     Plan:  -SSI  -Accu-checks   -Hypoglycemic protocol   -Hold oral antihyperglycemics while inpatient           Essential hypertension  Currently normotensive. BP usually well controlled per patient with home medications.  Plan:  -Optimize pain control   -Hold home medications (lisinopril, hctz) due to DALIA, titrate as needed   -Monitor BP  -Low salt/cardiac diet when not NPO  -IV hydralazine prn for SBP>160 or DBP>90             VTE Risk Mitigation (From admission, onward)         Ordered     Reason for No Pharmacological VTE Prophylaxis  Once        Question:  Reasons:  Answer:  Physician Provided (leave comment)    10/01/23 2342     IP VTE HIGH RISK PATIENT  Once         10/01/23 2342     Place sequential compression device  Until discontinued         10/01/23 2342                Discharge Planning   ANDRÉS:      Code Status: Full Code   Is the patient medically ready for discharge?:     Reason for patient still in hospital (select all that apply): Treatment  Discharge Plan A: Home with family                  Stephan Stone MD  Department of Hospital Medicine   [facility] - Med S[facility]

## 2023-10-04 NOTE — PLAN OF CARE
Problem: Adult Inpatient Plan of Care  Goal: Plan of Care Review  Outcome: Ongoing, Progressing  Flowsheets (Taken 10/4/2023 0551)  Plan of Care Reviewed With: patient  Goal: Patient-Specific Goal (Individualized)  Outcome: Ongoing, Progressing  Goal: Absence of Hospital-Acquired Illness or Injury  Outcome: Ongoing, Progressing  Intervention: Identify and Manage Fall Risk  Flowsheets (Taken 10/4/2023 0551)  Safety Promotion/Fall Prevention:   assistive device/personal item within reach   Fall Risk signage in place   Fall Risk reviewed with patient/family   nonskid shoes/socks when out of bed  Intervention: Prevent Skin Injury  Flowsheets (Taken 10/4/2023 0551)  Body Position: position changed independently  Skin Protection: adhesive use limited  Intervention: Prevent and Manage VTE (Venous Thromboembolism) Risk  Flowsheets (Taken 10/4/2023 0551)  Activity Management: Rolling - L1  Intervention: Prevent Infection  Flowsheets (Taken 10/4/2023 0551)  Infection Prevention:   hand hygiene promoted   rest/sleep promoted  Goal: Optimal Comfort and Wellbeing  Outcome: Ongoing, Progressing  Intervention: Monitor Pain and Promote Comfort  Flowsheets (Taken 10/4/2023 0551)  Pain Management Interventions:   around-the-clock dosing utilized   care clustered   quiet environment facilitated  Intervention: Provide Person-Centered Care  Flowsheets (Taken 10/4/2023 0551)  Trust Relationship/Rapport:   care explained   choices provided   emotional support provided   empathic listening provided   thoughts/feelings acknowledged   reassurance provided   questions encouraged   questions answered  Goal: Readiness for Transition of Care  Outcome: Ongoing, Progressing     Problem: Bariatric Environmental Safety  Goal: Safety Maintained with Care  Outcome: Ongoing, Progressing     Problem: Diabetes Comorbidity  Goal: Blood Glucose Level Within Targeted Range  Outcome: Ongoing, Progressing     Problem: Fluid and Electrolyte Imbalance (Acute  Kidney Injury/Impairment)  Goal: Fluid and Electrolyte Balance  Outcome: Ongoing, Progressing     Problem: Oral Intake Inadequate (Acute Kidney Injury/Impairment)  Goal: Optimal Nutrition Intake  Outcome: Ongoing, Progressing     Problem: Renal Function Impairment (Acute Kidney Injury/Impairment)  Goal: Effective Renal Function  Outcome: Ongoing, Progressing     Problem: Skin Injury Risk Increased  Goal: Skin Health and Integrity  Outcome: Ongoing, Progressing     Problem: Impaired Wound Healing  Goal: Optimal Wound Healing  Outcome: Ongoing, Progressing

## 2023-10-04 NOTE — PLAN OF CARE
Problem: Diabetes Comorbidity  Goal: Blood Glucose Level Within Targeted Range  Outcome: Ongoing, Not Progressing     Problem: Adult Inpatient Plan of Care  Goal: Patient-Specific Goal (Individualized)  Outcome: Ongoing, Not Progressing     Problem: Impaired Wound Healing  Goal: Optimal Wound Healing  Outcome: Ongoing, Not Progressing     Problem: Skin Injury Risk Increased  Goal: Skin Health and Integrity  Outcome: Ongoing, Not Progressing

## 2023-10-04 NOTE — MEDICAL/APP STUDENT
Date of Admit: 10/1/2023    HPI:         Chief Complaint   Patient presents with   Constipation and Dizziness (Generalized weakness and constipation per patient. Recent knee surgery. Has been taking pain medication. )    Roslyn Conde is a 63 y.o. female  with a PMH  has a past medical history of Chronic back pain, Diabetes mellitus, type 2, and Hypertension.  Presented to the ER for evaluation of dizziness with syncopal episode earlier today.  Patient reports she was on the commode when she had a large bowel movement.  Patient states that she was dizzy and felt like she was going to pass out while on the toilet.  Patient stated when she stood up to walk she had loss of consciousness.  Associated symptoms include generalized abdominal pain generalized weakness/fatigue and 1 episode of nonbloody vomitus and sweating.  Patient states that she was sitting pain management for back and knee pain.  Patient states her pain management doctor recently increased her Percocet to 4 times daily urine due to having persistent pain of her her left knee secondary to recent TKA.  Patient received Narcan with improvement of symptoms.  Patient states she takes her medication as prescribed.  Denies previous issues with pain medication.  States her generalized abdominal pain at 0/10.  Denies any other complaints at this time.     ER workup revealed leukocytosis of 16.77, BUN/creatinine of 2.5/21, CBG to 12 mg/dL, lactic acid of 3.4 with repeat level of 3.3 after IVF.  UA positive for cystitis.  All remaining blood work unremarkable.  Patient received 2 g of vancomycin, 1 g of cefepime, 500 cc bolus of LR, sepsis bolus of LR at 2, 538 cc, and 3 doses of 0.4 mg of Narcan.  EKG revealed sinus tachycardia with a ventricular rate of 115 beats per minute with a QT/QTC of 456/630.  CT abdomen and pelvis revealed:[ Prominent small bowel loops in the pelvis measuring up to 33 mm in diameter with mild mucosal thickening suggestive of enteritis  and partial small bowel obstruction. Mild associated mesenteric edema. Colonic loops of bowel are decompressed. Mild mucosal thickening of the transverse and right colon.  Correlate clinically for colitis]. Hospital Medicine consulted to admit patient for small-bowel obstruction.  Patient family at bedside are in agreement with treatment plan.  Patient will be admitted under inpatient status.      Interval History: Tolerated liquid diet last night. States that she had one very short episode of nausea. No vomiting. No BM. Had one episode of flatus this AM. Poor ambulation. Denies abdominal pain.      MEDICATIONS & ALLERGIES:     No current facility-administered medications on file prior to encounter.     Current Outpatient Medications on File Prior to Encounter   Medication Sig Dispense Refill    aspirin 81 MG Chew Take 81 mg by mouth.      cetirizine (ZYRTEC) 10 MG tablet Take 10 mg by mouth once daily.      FARXIGA 5 mg Tab tablet Take 5 mg by mouth every morning.      hydroCHLOROthiazide (HYDRODIURIL) 25 MG tablet Take 1 tablet (25 mg total) by mouth once daily. 90 tablet 3    hyoscyamine (ANASPAZ,LEVSIN) 0.125 mg Tab Take 125 mcg by mouth 4 (four) times daily.      linaCLOtide (LINZESS) 72 mcg Cap capsule Take 72 mcg by mouth before breakfast.      lisinopriL (PRINIVIL,ZESTRIL) 5 MG tablet Take 5 mg by mouth once daily.      metFORMIN (GLUCOPHAGE) 500 MG tablet Take with meals. Take 1 qam for 1 wk, then bid for 1 wk, then 2 am/1 pm for 1 wk, then 2 bid till finished. 70 tablet 0    metoclopramide HCl (REGLAN) 10 MG tablet Take 1 tablet (10 mg total) by mouth every 6 (six) hours. 30 tablet 0    MOUNJARO 5 mg/0.5 mL PnIj Inject 5 mg into the skin once a week.      ondansetron (ZOFRAN-ODT) 4 MG TbDL       oxyCODONE-acetaminophen (PERCOCET)  mg per tablet Take 1 tablet by mouth.      pantoprazole (PROTONIX) 40 MG tablet Take 40 mg by mouth once daily.      pregabalin (LYRICA) 75 MG capsule Take 75 mg by mouth 2  (two) times daily.      rosuvastatin (CRESTOR) 20 MG tablet Take 20 mg by mouth nightly.      fluticasone propionate (FLONASE) 50 mcg/actuation nasal spray fluticasone propionate 50 mcg/actuation nasal spray,suspension   Spray 2 sprays every day by intranasal route.          Review of patient's allergies indicates:  No Known Allergies      PAST HISTORY:     Past Medical History:   Diagnosis Date    Chronic back pain     Diabetes mellitus, type 2     Hypertension        Scheduled Meds:    aluminum-magnesium hydroxide-simethicone  30 mL Oral QID (AC & HS)    ciprofloxacin HCl  750 mg Oral Q12H    metroNIDAZOLE  500 mg Oral Q8H    pregabalin  75 mg Oral BID    sucralfate  1 g Oral Q6H      PRN Meds: acetaminophen, acetaminophen, albuterol-ipratropium, aluminum-magnesium hydroxide-simethicone, dextrose 10%, dextrose 10%, glucagon (human recombinant), glucose, glucose, hydrALAZINE, HYDROcodone-acetaminophen, insulin aspart U-100, melatonin, morphine, naloxone, polyethylene glycol, prochlorperazine, simethicone, sodium chloride 0.9%     Past Surgical History:   Procedure Laterality Date    ANKLE SURGERY      right    BACK SURGERY      HYSTERECTOMY      TONSILLECTOMY      TOTAL HIP ARTHROPLASTY Left     TUBAL LIGATION         History reviewed. No pertinent family history.    Social History     Socioeconomic History    Marital status:    Tobacco Use    Smoking status: Never    Smokeless tobacco: Never   Substance and Sexual Activity    Alcohol use: No    Drug use: No       Review of Systems:  Review of Systems   Constitutional:  Positive for malaise/fatigue. Negative for chills and fever.   HENT:  Negative for sore throat.    Respiratory:  Negative for cough, wheezing and stridor.    Cardiovascular:  Negative for chest pain, palpitations and orthopnea.   Gastrointestinal:  Positive for constipation and nausea. Negative for abdominal pain, diarrhea, heartburn and vomiting.       Exam     Vitals:    10/04/23 0300  10/04/23 0409 10/04/23 0500 10/04/23 0713   BP:  130/63  126/60   BP Location:  Right arm     Patient Position:  Lying     Pulse: 66 67 66    Resp:  20     Temp:  98.1 °F (36.7 °C)  97.5 °F (36.4 °C)   TempSrc:  Oral     SpO2:  97%     Weight:       Height:           Recent Results (from the past 72 hour(s))   Blood culture x two cultures. Draw prior to antibiotics.    Collection Time: 10/01/23  5:50 PM    Specimen: Peripheral, Antecubital, Left; Blood   Result Value Ref Range    Blood Culture, Routine No Growth to date     Blood Culture, Routine No Growth to date     Blood Culture, Routine No Growth to date    CBC auto differential    Collection Time: 10/01/23  6:03 PM   Result Value Ref Range    WBC 16.77 (H) 3.90 - 12.70 K/uL    RBC 5.47 (H) 4.00 - 5.40 M/uL    Hemoglobin 16.1 (H) 12.0 - 16.0 g/dL    Hematocrit 51.1 (H) 37.0 - 48.5 %    MCV 93 82 - 98 fL    MCH 29.4 27.0 - 31.0 pg    MCHC 31.5 (L) 32.0 - 36.0 g/dL    RDW 13.8 11.5 - 14.5 %    Platelets 320 150 - 450 K/uL    MPV 10.2 9.2 - 12.9 fL    Immature Granulocytes 1.3 (H) 0.0 - 0.5 %    Gran # (ANC) 14.9 (H) 1.8 - 7.7 K/uL    Immature Grans (Abs) 0.22 (H) 0.00 - 0.04 K/uL    Lymph # 1.1 1.0 - 4.8 K/uL    Mono # 0.4 0.3 - 1.0 K/uL    Eos # 0.1 0.0 - 0.5 K/uL    Baso # 0.06 0.00 - 0.20 K/uL    nRBC 0 0 /100 WBC    Gran % 89.1 (H) 38.0 - 73.0 %    Lymph % 6.6 (L) 18.0 - 48.0 %    Mono % 2.1 (L) 4.0 - 15.0 %    Eosinophil % 0.5 0.0 - 8.0 %    Basophil % 0.4 0.0 - 1.9 %    Platelet Estimate Clumped (A)     Differential Method Automated    Comprehensive metabolic panel    Collection Time: 10/01/23  6:03 PM   Result Value Ref Range    Sodium 141 136 - 145 mmol/L    Potassium 3.5 3.5 - 5.1 mmol/L    Chloride 100 95 - 110 mmol/L    CO2 24 23 - 29 mmol/L    Glucose 212 (H) 70 - 110 mg/dL    BUN 19 8 - 23 mg/dL    Creatinine 2.5 (H) 0.5 - 1.4 mg/dL    Calcium 9.1 8.7 - 10.5 mg/dL    Total Protein 7.3 6.0 - 8.4 g/dL    Albumin 3.4 (L) 3.5 - 5.2 g/dL    Total Bilirubin  1.1 (H) 0.1 - 1.0 mg/dL    Alkaline Phosphatase 108 55 - 135 U/L    AST 10 10 - 40 U/L    ALT 5 (L) 10 - 44 U/L    eGFR 21 (A) >60 mL/min/1.73 m^2    Anion Gap 17 (H) 8 - 16 mmol/L   Lactic acid, plasma #1    Collection Time: 10/01/23  6:03 PM   Result Value Ref Range    Lactate (Lactic Acid) 3.4 (H) 0.5 - 2.2 mmol/L   Magnesium    Collection Time: 10/01/23  6:03 PM   Result Value Ref Range    Magnesium 1.6 1.6 - 2.6 mg/dL   Phosphorus    Collection Time: 10/01/23  6:03 PM   Result Value Ref Range    Phosphorus 4.6 (H) 2.7 - 4.5 mg/dL   Brain natriuretic peptide    Collection Time: 10/01/23  6:03 PM   Result Value Ref Range    BNP <10 0 - 99 pg/mL   Troponin I    Collection Time: 10/01/23  6:03 PM   Result Value Ref Range    Troponin I <0.006 0.000 - 0.026 ng/mL   Procalcitonin    Collection Time: 10/01/23  6:03 PM   Result Value Ref Range    Procalcitonin 0.24 <0.25 ng/mL   Lipase    Collection Time: 10/01/23  6:03 PM   Result Value Ref Range    Lipase 33 4 - 60 U/L   Blood culture x two cultures. Draw prior to antibiotics.    Collection Time: 10/01/23  6:05 PM    Specimen: Peripheral, Hand, Right; Blood   Result Value Ref Range    Blood Culture, Routine No Growth to date     Blood Culture, Routine No Growth to date     Blood Culture, Routine No Growth to date    COVID-19 Rapid Screening    Collection Time: 10/01/23  6:16 PM   Result Value Ref Range    SARS-CoV-2 RNA, Amplification, Qual Negative Negative   Influenza A & B by Molecular    Collection Time: 10/01/23  6:16 PM    Specimen: Nasopharyngeal Swab   Result Value Ref Range    Influenza A, Molecular Negative Negative    Influenza B, Molecular Negative Negative    Flu A & B Source Nasal swab    Urinalysis, Reflex to Urine Culture Urine, Clean Catch    Collection Time: 10/01/23  6:51 PM    Specimen: Urine   Result Value Ref Range    Specimen UA Urine, Clean Catch     Color, UA Orange (A) Yellow, Straw, Breanna    Appearance, UA Cloudy (A) Clear    pH, UA 6.0 5.0  - 8.0    Specific Gravity, UA 1.030 1.005 - 1.030    Protein, UA 3+ (A) Negative    Glucose, UA 1+ (A) Negative    Ketones, UA Negative Negative    Bilirubin (UA) 1+ (A) Negative    Occult Blood UA Trace (A) Negative    Nitrite, UA Negative Negative    Urobilinogen, UA >=8.0 (A) <2.0 EU/dL    Leukocytes, UA Trace (A) Negative   Urinalysis Microscopic    Collection Time: 10/01/23  6:51 PM   Result Value Ref Range    RBC, UA 8 (H) 0 - 4 /hpf    WBC, UA 23 (H) 0 - 5 /hpf    Bacteria Occasional None-Occ /hpf    Hyaline Casts, UA 31 (A) 0-1/lpf /lpf    Granular Casts, UA 18 (A) None /lpf    Amorphous, UA Occasional None-Moderate    Microscopic Comment SEE COMMENT    Urine culture    Collection Time: 10/01/23  6:51 PM    Specimen: Urine   Result Value Ref Range    Urine Culture, Routine No growth    Lactic acid, plasma #2    Collection Time: 10/01/23 10:12 PM   Result Value Ref Range    Lactate (Lactic Acid) 3.3 (H) 0.5 - 2.2 mmol/L   Lactic acid, plasma    Collection Time: 10/02/23  2:11 AM   Result Value Ref Range    Lactate (Lactic Acid) 1.6 0.5 - 2.2 mmol/L   Basic Metabolic Panel    Collection Time: 10/02/23  9:17 AM   Result Value Ref Range    Sodium 138 136 - 145 mmol/L    Potassium 3.5 3.5 - 5.1 mmol/L    Chloride 102 95 - 110 mmol/L    CO2 24 23 - 29 mmol/L    Glucose 98 70 - 110 mg/dL    BUN 22 8 - 23 mg/dL    Creatinine 2.4 (H) 0.5 - 1.4 mg/dL    Calcium 7.9 (L) 8.7 - 10.5 mg/dL    Anion Gap 12 8 - 16 mmol/L    eGFR 22 (A) >60 mL/min/1.73 m^2   CBC Auto Differential    Collection Time: 10/02/23  9:18 AM   Result Value Ref Range    WBC 10.59 3.90 - 12.70 K/uL    RBC 3.75 (L) 4.00 - 5.40 M/uL    Hemoglobin 11.2 (L) 12.0 - 16.0 g/dL    Hematocrit 35.0 (L) 37.0 - 48.5 %    MCV 93 82 - 98 fL    MCH 29.9 27.0 - 31.0 pg    MCHC 32.0 32.0 - 36.0 g/dL    RDW 14.1 11.5 - 14.5 %    Platelets 235 150 - 450 K/uL    MPV 9.3 9.2 - 12.9 fL    Immature Granulocytes 0.3 0.0 - 0.5 %    Gran # (ANC) 7.4 1.8 - 7.7 K/uL    Immature  Grans (Abs) 0.03 0.00 - 0.04 K/uL    Lymph # 2.0 1.0 - 4.8 K/uL    Mono # 0.7 0.3 - 1.0 K/uL    Eos # 0.5 0.0 - 0.5 K/uL    Baso # 0.04 0.00 - 0.20 K/uL    nRBC 0 0 /100 WBC    Gran % 70.0 38.0 - 73.0 %    Lymph % 18.5 18.0 - 48.0 %    Mono % 6.6 4.0 - 15.0 %    Eosinophil % 4.2 0.0 - 8.0 %    Basophil % 0.4 0.0 - 1.9 %    Differential Method Automated    POCT glucose    Collection Time: 10/02/23  1:03 PM   Result Value Ref Range    POCT Glucose 86 70 - 110 mg/dL   POCT glucose    Collection Time: 10/02/23  5:25 PM   Result Value Ref Range    POCT Glucose 64 (L) 70 - 110 mg/dL   POCT glucose    Collection Time: 10/02/23  9:36 PM   Result Value Ref Range    POCT Glucose 47 (LL) 70 - 110 mg/dL   POCT glucose    Collection Time: 10/02/23 10:23 PM   Result Value Ref Range    POCT Glucose 115 (H) 70 - 110 mg/dL   POCT glucose    Collection Time: 10/03/23  4:00 AM   Result Value Ref Range    POCT Glucose 39 (LL) 70 - 110 mg/dL   POCT glucose    Collection Time: 10/03/23  5:07 AM   Result Value Ref Range    POCT Glucose 111 (H) 70 - 110 mg/dL   CBC auto differential    Collection Time: 10/03/23  8:16 AM   Result Value Ref Range    WBC 5.36 3.90 - 12.70 K/uL    RBC 3.55 (L) 4.00 - 5.40 M/uL    Hemoglobin 10.5 (L) 12.0 - 16.0 g/dL    Hematocrit 33.3 (L) 37.0 - 48.5 %    MCV 94 82 - 98 fL    MCH 29.6 27.0 - 31.0 pg    MCHC 31.5 (L) 32.0 - 36.0 g/dL    RDW 13.7 11.5 - 14.5 %    Platelets 180 150 - 450 K/uL    MPV 9.2 9.2 - 12.9 fL    Immature Granulocytes 0.4 0.0 - 0.5 %    Gran # (ANC) 3.7 1.8 - 7.7 K/uL    Immature Grans (Abs) 0.02 0.00 - 0.04 K/uL    Lymph # 0.9 (L) 1.0 - 4.8 K/uL    Mono # 0.3 0.3 - 1.0 K/uL    Eos # 0.4 0.0 - 0.5 K/uL    Baso # 0.02 0.00 - 0.20 K/uL    nRBC 0 0 /100 WBC    Gran % 68.7 38.0 - 73.0 %    Lymph % 16.6 (L) 18.0 - 48.0 %    Mono % 6.3 4.0 - 15.0 %    Eosinophil % 7.6 0.0 - 8.0 %    Basophil % 0.4 0.0 - 1.9 %    Differential Method Automated    Basic metabolic panel    Collection Time: 10/03/23   8:16 AM   Result Value Ref Range    Sodium 144 136 - 145 mmol/L    Potassium 3.6 3.5 - 5.1 mmol/L    Chloride 105 95 - 110 mmol/L    CO2 26 23 - 29 mmol/L    Glucose 92 70 - 110 mg/dL    BUN 19 8 - 23 mg/dL    Creatinine 1.9 (H) 0.5 - 1.4 mg/dL    Calcium 8.8 8.7 - 10.5 mg/dL    Anion Gap 13 8 - 16 mmol/L    eGFR 29 (A) >60 mL/min/1.73 m^2   POCT glucose    Collection Time: 10/03/23 12:48 PM   Result Value Ref Range    POCT Glucose 58 (L) 70 - 110 mg/dL   POCT glucose    Collection Time: 10/03/23  1:03 PM   Result Value Ref Range    POCT Glucose 62 (L) 70 - 110 mg/dL   POCT glucose    Collection Time: 10/03/23  1:17 PM   Result Value Ref Range    POCT Glucose 140 (H) 70 - 110 mg/dL   POCT glucose    Collection Time: 10/03/23  4:44 PM   Result Value Ref Range    POCT Glucose 63 (L) 70 - 110 mg/dL   POCT glucose    Collection Time: 10/03/23  4:56 PM   Result Value Ref Range    POCT Glucose 93 70 - 110 mg/dL   POCT glucose    Collection Time: 10/03/23  8:20 PM   Result Value Ref Range    POCT Glucose 93 70 - 110 mg/dL   POCT glucose    Collection Time: 10/03/23 11:40 PM   Result Value Ref Range    POCT Glucose 51 (L) 70 - 110 mg/dL   POCT glucose    Collection Time: 10/04/23 12:27 AM   Result Value Ref Range    POCT Glucose 122 (H) 70 - 110 mg/dL   POCT glucose    Collection Time: 10/04/23  3:00 AM   Result Value Ref Range    POCT Glucose 105 70 - 110 mg/dL   POCT glucose    Collection Time: 10/04/23  6:08 AM   Result Value Ref Range    POCT Glucose 81 70 - 110 mg/dL   CBC auto differential    Collection Time: 10/04/23  7:53 AM   Result Value Ref Range    WBC 4.42 3.90 - 12.70 K/uL    RBC 3.55 (L) 4.00 - 5.40 M/uL    Hemoglobin 10.5 (L) 12.0 - 16.0 g/dL    Hematocrit 33.7 (L) 37.0 - 48.5 %    MCV 95 82 - 98 fL    MCH 29.6 27.0 - 31.0 pg    MCHC 31.2 (L) 32.0 - 36.0 g/dL    RDW 13.5 11.5 - 14.5 %    Platelets 155 150 - 450 K/uL    MPV 9.4 9.2 - 12.9 fL    Immature Granulocytes 0.2 0.0 - 0.5 %    Gran # (ANC) 2.7 1.8 -  7.7 K/uL    Immature Grans (Abs) 0.01 0.00 - 0.04 K/uL    Lymph # 1.0 1.0 - 4.8 K/uL    Mono # 0.4 0.3 - 1.0 K/uL    Eos # 0.4 0.0 - 0.5 K/uL    Baso # 0.02 0.00 - 0.20 K/uL    nRBC 0 0 /100 WBC    Gran % 60.0 38.0 - 73.0 %    Lymph % 21.9 18.0 - 48.0 %    Mono % 9.3 4.0 - 15.0 %    Eosinophil % 8.1 (H) 0.0 - 8.0 %    Basophil % 0.5 0.0 - 1.9 %    Differential Method Automated    Basic metabolic panel    Collection Time: 10/04/23  7:53 AM   Result Value Ref Range    Sodium 140 136 - 145 mmol/L    Potassium 3.6 3.5 - 5.1 mmol/L    Chloride 103 95 - 110 mmol/L    CO2 25 23 - 29 mmol/L    Glucose 129 (H) 70 - 110 mg/dL    BUN 11 8 - 23 mg/dL    Creatinine 1.4 0.5 - 1.4 mg/dL    Calcium 8.7 8.7 - 10.5 mg/dL    Anion Gap 12 8 - 16 mmol/L    eGFR 42 (A) >60 mL/min/1.73 m^2   Magnesium    Collection Time: 10/04/23  7:53 AM   Result Value Ref Range    Magnesium 1.7 1.6 - 2.6 mg/dL        Body mass index is 45.86 kg/m².    Physical Exam  Constitutional:       Appearance: She is obese.   Cardiovascular:      Rate and Rhythm: Normal rate and regular rhythm.      Pulses: Normal pulses.      Heart sounds: Normal heart sounds.   Pulmonary:      Effort: Pulmonary effort is normal. No respiratory distress.      Breath sounds: Normal breath sounds. No stridor. No wheezing.   Chest:      Chest wall: No tenderness.   Abdominal:      General: Abdomen is flat.      Palpations: Abdomen is soft.      Tenderness: There is no abdominal tenderness. There is no guarding or rebound.   Skin:     General: Skin is warm and dry.      Coloration: Skin is not jaundiced.   Neurological:      Mental Status: She is oriented to person, place, and time.           Assessment and Plan   Current Problems List:  Active Hospital Problems    Diagnosis  POA    *Enteritis [K52.9]  Yes    Leukocytosis [D72.829]  Yes    Elevated lactic acid level [R79.89]  Yes    DALIA (acute kidney injury) [N17.9]  Yes    Acute cystitis with hematuria [N30.01]  Yes    Chronic low  back pain [M54.50, G89.29]  Unknown    Accidental overdose [T50.901A]  Yes    Morbid obesity [E66.01]  Yes    Type 2 diabetes mellitus with hyperglycemia, without long-term current use of insulin [E11.65]  Yes     Last Assessment & Plan:   Formatting of this note might be different from the original.  History & Physical   Will monitor Accu-Cheks.  Consideration for insulin coverage pending clinical course, blood glucose trend.  Discharge Summary   No acute issues.  Hold Metformin for now in light of elevated creatinine.  Follow-up Accuchecks stable. Will continue to monitor, will plan to cover with insulin if indicated.      Mixed hyperlipidemia [E78.2]  Yes     Last Assessment & Plan:   Formatting of this note might be different from the original.  History & Physical   Hold statin therapy in light of acute illness, nausea/vomiting.  Discharge Summary   Hold statin for now.  Can likely resume in the near future when her GI symptoms have improved.  Follow-up      Essential hypertension [I10]  Yes     Last Assessment & Plan:   Formatting of this note is different from the original.  History & Physical   All blood pressure medications for now in light of acute illness.  Will monitor and adjust/resume as appropriate.  Discharge Summary   Hold blood pressure medications in light of otherwise low-normal blood pressure, DALIA.   Follow-up Hold blood pressure medications in light of otherwise low-normal blood pressure, DALIA.  We will continue to monitor and adjust/resume as appropriate.        Resolved Hospital Problems   No resolved problems to display.     * Enteritis (per surgery recs)  64yo F with enteritis/colitis vs pSBO     - No acute surgical intervention required at this time  - patient not clinically obstructed and not showing symptoms of partial obstruction. Patient with ongoing bowel function, no vomiting and no abdominal distention. Exam and history more consistent with enteritis/colitis or pseudo-obstruction from  narcotic abuse   - continue liquids this AM. Possibly advance to regular diet later today if tolerating CLD/has further bowel function  - hold narcotics if able  - encouraged OOB, ambulation      Constipation (Chronic)     - Possibly refer to a new outpatient GI doctor. She feels her current provider has not done enough to address her constipation.        Accidental overdose  Received 3 doses of 0.4 mg Narcan with response.  Excessive sedation likely a result from taking pain medication in setting of IVVD. Currently voices no complaints at this time.   Plan:  -hold narcotics   -tele monitoring  -monitor vitals  -supplemental O2 as needed  -additional narcan if needed     Chronic low back pain  Compliant with home meds.  Plan:  -continue lyrica  -hold narcotics due to accidental overdose        Acute cystitis with hematuria  Urinalysis revealed:             Lab Results   Component Value Date     COLORU Orange (A) 10/01/2023     APPEARANCEUA Cloudy (A) 10/01/2023     SPECGRAV 1.030 10/01/2023     PHUR 6.0 10/01/2023     PROTEINUA 3+ (A) 10/01/2023     GLUCUA 1+ (A) 10/01/2023     KETONESU Negative 10/01/2023     NITRITE Negative 10/01/2023     UROBILINOGEN >=8.0 (A) 10/01/2023     BILIRUBINUA 1+ (A) 10/01/2023     LEUKOCYTESUR Trace (A) 10/01/2023     RBCUA 8 (H) 10/01/2023     WBCUA 23 (H) 10/01/2023     BACTERIA Occasional 10/01/2023     HYALINECASTS 31 (A) 10/01/2023   Received vanco and cefepime  Plan:  -UA culture pending  -Continue Abx     10/2:  Cont cefepime        DALIA (acute kidney injury)  Patient with acute kidney injury likely d/t IVVD/Dehydration. DALIA is currently being treated. Labs reviewed- Renal function/electrolytes with Estimated Creatinine Clearance: 31.9 mL/min (A) (based on SCr of 2.4 mg/dL (H)). according to latest data. Monitor urine output and serial BMP and adjust therapy as needed. Avoid nephrotoxins and renally dose meds for GFR listed above.      Cont IVF     Elevated lactic acid  level  See above        Leukocytosis  Possibly reactive  Will cont IVF  Empiric cefepime for now            Mixed hyperlipidemia  Patient is chronically on statin.will not continue for now. Last Lipid Panel:             Lab Results   Component Value Date     CHOL 119 03/29/2023     HDL 48 03/29/2023     LDLCALC 51 03/29/2023     TRIG 110 03/29/2023     CHOLHDL 31.3 12/12/2012      Plan:  -Continue home medication  -low fat/low calorie diet           Type 2 diabetes mellitus with hyperglycemia, without long-term current use of insulin  Most recent A1c measuring             Hemoglobin A1C   Date Value Ref Range Status   02/23/2020 6.0 <=6.5 % Final   04/11/2013 6.2 4.0 - 6.2 % Final      Plan:  -SSI  -Accu-checks   -Hypoglycemic protocol   -Hold oral antihyperglycemics while inpatient               Essential hypertension  Currently normotensive. BP usually well controlled per patient with home medications.  Plan:  -Optimize pain control   -Hold home medications (lisinopril, hctz) due to DALIA, titrate as needed   -Monitor BP  -Low salt/cardiac diet when not NPO  -IV hydralazine prn for SBP>160 or DBP>90                       VTE Risk Mitigation (From admission, onward)           Ordered       Reason for No Pharmacological VTE Prophylaxis  Once        Question:  Reasons:  Answer:  Physician Provided (leave comment)    10/01/23 2342       IP VTE HIGH RISK PATIENT  Once         10/01/23 2342       Place sequential compression device  Until discontinued         10/01/23 2342                       Discharge Planning   ANDRÉS:      Code Status: Full Code   Is the patient medically ready for discharge?:     Reason for patient still in hospital (select all that apply): Patient trending condition  Discharge Plan A: Home with family         Corey Levin MS3  [facility]

## 2023-10-04 NOTE — SUBJECTIVE & OBJECTIVE
Interval History:     Review of Systems   Constitutional:  Positive for fatigue. Negative for activity change, appetite change, chills, fever and unexpected weight change.   HENT:  Negative for congestion, ear pain, sore throat and trouble swallowing.    Eyes:  Negative for pain, redness and itching.   Respiratory:  Negative for cough, shortness of breath and wheezing.    Cardiovascular:  Negative for chest pain, palpitations and leg swelling.   Gastrointestinal:  Positive for constipation. Negative for abdominal distention, abdominal pain, anal bleeding, blood in stool, diarrhea, nausea, rectal pain and vomiting.   Endocrine: Negative for cold intolerance, heat intolerance and polyuria.   Genitourinary:  Negative for dysuria, flank pain, frequency and hematuria.   Musculoskeletal:  Negative for gait problem, joint swelling and neck pain.   Skin:  Negative for color change, rash and wound.   Allergic/Immunologic: Negative for environmental allergies and immunocompromised state.   Neurological:  Negative for dizziness, speech difficulty, weakness and numbness.   Psychiatric/Behavioral:  Negative for agitation, confusion and hallucinations.      Objective:     Vital Signs (Most Recent):  Temp: 97.5 °F (36.4 °C) (10/04/23 0713)  Pulse: 66 (10/04/23 0713)  Resp: 19 (10/04/23 0713)  BP: 126/60 (10/04/23 0713)  SpO2: 98 % (10/04/23 0713) Vital Signs (24h Range):  Temp:  [97.5 °F (36.4 °C)-98.2 °F (36.8 °C)] 97.5 °F (36.4 °C)  Pulse:  [61-71] 66  Resp:  [16-20] 19  SpO2:  [95 %-98 %] 98 %  BP: ()/(53-63) 126/60     Weight: 125 kg (275 lb 9.2 oz)  Body mass index is 45.86 kg/m².    Intake/Output Summary (Last 24 hours) at 10/4/2023 1409  Last data filed at 10/4/2023 1337  Gross per 24 hour   Intake 2318.5 ml   Output --   Net 2318.5 ml         Physical Exam  Constitutional:       Appearance: She is well-developed. She is obese.   HENT:      Head: Normocephalic and atraumatic.   Eyes:      Conjunctiva/sclera:  Conjunctivae normal.   Neck:      Thyroid: No thyromegaly.   Cardiovascular:      Rate and Rhythm: Normal rate.   Pulmonary:      Effort: Pulmonary effort is normal. No respiratory distress.   Abdominal:      Comments: Soft, nondistended, nontender; no rebound or guarding; no tympany; well-healed previous port sites   Musculoskeletal:         General: No tenderness. Normal range of motion.      Cervical back: Normal range of motion.   Skin:     General: Skin is warm and dry.      Capillary Refill: Capillary refill takes less than 2 seconds.      Findings: No rash.   Neurological:      Mental Status: She is alert and oriented to person, place, and time.             Significant Labs: All pertinent labs within the past 24 hours have been reviewed.  Recent Lab Results  (Last 5 results in the past 24 hours)        10/04/23  1135   10/04/23  0753   10/04/23  0608   10/04/23  0300   10/04/23  0027        Anion Gap   12             Baso #   0.02             Basophil %   0.5             BUN   11             Calcium   8.7             Chloride   103             CO2   25             Creatinine   1.4             Differential Method   Automated             eGFR   42             Eos #   0.4             Eosinophil %   8.1             Glucose   129             Gran # (ANC)   2.7             Gran %   60.0             Hematocrit   33.7             Hemoglobin   10.5             Immature Grans (Abs)   0.01  Comment: Mild elevation in immature granulocytes is non specific and   can be seen in a variety of conditions including stress response,   acute inflammation, trauma and pregnancy. Correlation with other   laboratory and clinical findings is essential.               Immature Granulocytes   0.2             Lymph #   1.0             Lymph %   21.9             Magnesium    1.7             MCH   29.6             MCHC   31.2             MCV   95             Mono #   0.4             Mono %   9.3             MPV   9.4             nRBC   0              Platelet Count   155             POCT Glucose 95     81   105   122       Potassium   3.6             RBC   3.55             RDW   13.5             Sodium   140             WBC   4.42                                    Significant Imaging: I have reviewed all pertinent imaging results/findings within the past 24 hours.

## 2023-10-04 NOTE — ASSESSMENT & PLAN NOTE
Patient with acute kidney injury likely d/t IVVD/Dehydration. DALIA is currently being treated. Labs reviewed- Renal function/electrolytes with Estimated Creatinine Clearance: 54.7 mL/min (based on SCr of 1.4 mg/dL). according to latest data. Monitor urine output and serial BMP and adjust therapy as needed. Avoid nephrotoxins and renally dose meds for GFR listed above.     Cont IVF  Improving

## 2023-10-04 NOTE — PROGRESS NOTES
[facility] Surg  General Surgery  Progress Note    Subjective:     History of Present Illness:  62yo F with a PMHx significant for HTN, DM, chronic back pain and chronic narcotic use who presented to the ED for dizziness, syncope and a large bowel movement. Workup in ED positive for narcotic overdose requiring Narcan, leukocytosis of 16k, creatinine of 2.5, lactic acidosis of 3.4, +UA and CT showing thickened small bowel with dilation but no transition point with associated mesenteric edema and thickening of the mucosa of the transverse and right colon concerning for enteritis/colitis. Patient admitted to hospital medicine and surgery consulted for evaluation as CT read concerning for possible pSBO. Patient endorses chronic constipation and abdominal pain, nausea but no emesis. Reports last BM and flatus were yesterday. Only previous abdominal surgical history of tubal ligation and robotic hysterectomy.    Interval History: Tolerated liquid diet last night. States that she had one very short episode of nausea. No vomiting. No BM. Had one episode of flatus this AM. Poor ambulation. Denies abdominal pain.    Medications:  Continuous Infusions:   dextrose 5 % and 0.45 % NaCl 100 mL/hr at 10/04/23 0442     Scheduled Meds:   aluminum-magnesium hydroxide-simethicone  30 mL Oral QID (AC & HS)    ciprofloxacin HCl  750 mg Oral Q12H    metroNIDAZOLE  500 mg Oral Q8H    pregabalin  75 mg Oral BID    sucralfate  1 g Oral Q6H     PRN Meds:acetaminophen, acetaminophen, albuterol-ipratropium, aluminum-magnesium hydroxide-simethicone, dextrose 10%, dextrose 10%, glucagon (human recombinant), glucose, glucose, hydrALAZINE, HYDROcodone-acetaminophen, insulin aspart U-100, melatonin, morphine, naloxone, polyethylene glycol, prochlorperazine, simethicone, sodium chloride 0.9%     Review of patient's allergies indicates:  No Known Allergies  Objective:     Vital Signs (Most Recent):  Temp: 97.5 °F (36.4 °C) (10/04/23  0713)  Pulse: 66 (10/04/23 0500)  Resp: 20 (10/04/23 0409)  BP: 126/60 (10/04/23 0713)  SpO2: 97 % (10/04/23 0409) Vital Signs (24h Range):  Temp:  [97.5 °F (36.4 °C)-98.2 °F (36.8 °C)] 97.5 °F (36.4 °C)  Pulse:  [61-72] 66  Resp:  [16-20] 20  SpO2:  [95 %-98 %] 97 %  BP: ()/(53-63) 126/60     Weight: 125 kg (275 lb 9.2 oz)  Body mass index is 45.86 kg/m².    Intake/Output - Last 3 Shifts         10/02 0700  10/03 0659 10/03 0700  10/04 0659 10/04 0700  10/05 0659    P.O.  240     I.V. (mL/kg)  3210.7 (25.7)     IV Piggyback  888     Total Intake(mL/kg)  4338.7 (34.7)     Urine (mL/kg/hr) 1500 (0.5)      Total Output 1500      Net -1500 +4338.7            Urine Occurrence 3 x 2 x     Stool Occurrence  0 x              Physical Exam  Constitutional:       Appearance: She is well-developed. She is obese.   HENT:      Head: Normocephalic and atraumatic.   Eyes:      Conjunctiva/sclera: Conjunctivae normal.   Neck:      Thyroid: No thyromegaly.   Cardiovascular:      Rate and Rhythm: Normal rate.   Pulmonary:      Effort: Pulmonary effort is normal. No respiratory distress.   Abdominal:      Comments: Soft, nondistended, nontender; no rebound or guarding; no tympany; well-healed previous port sites   Musculoskeletal:         General: No tenderness. Normal range of motion.      Cervical back: Normal range of motion.   Skin:     General: Skin is warm and dry.      Capillary Refill: Capillary refill takes less than 2 seconds.      Findings: No rash.   Neurological:      Mental Status: She is alert and oriented to person, place, and time.          Significant Labs:  I have reviewed all pertinent lab results within the past 24 hours.  CBC:   Recent Labs   Lab 10/04/23  0753   WBC 4.42   RBC 3.55*   HGB 10.5*   HCT 33.7*      MCV 95   MCH 29.6   MCHC 31.2*     BMP:   Recent Labs   Lab 10/04/23  0753   *      K 3.6      CO2 25   BUN 11   CREATININE 1.4   CALCIUM 8.7   MG 1.7     CMP:   Recent  Labs   Lab 10/01/23  1803 10/02/23  0917 10/04/23  0753   *   < > 129*   CALCIUM 9.1   < > 8.7   ALBUMIN 3.4*  --   --    PROT 7.3  --   --       < > 140   K 3.5   < > 3.6   CO2 24   < > 25      < > 103   BUN 19   < > 11   CREATININE 2.5*   < > 1.4   ALKPHOS 108  --   --    ALT 5*  --   --    AST 10  --   --    BILITOT 1.1*  --   --     < > = values in this interval not displayed.       Significant Diagnostics:  I have reviewed all pertinent imaging results/findings within the past 24 hours.    Assessment/Plan:     * Enteritis  64yo F with enteritis/colitis vs pSBO    - No acute surgical intervention required at this time  - patient not clinically obstructed and not showing symptoms of partial obstruction. Patient with ongoing bowel function, no vomiting and no abdominal distention. Exam and history more consistent with enteritis/colitis or pseudo-obstruction from narcotic abuse   - continue liquids this AM. Possibly advance to regular diet later today if tolerating CLD/has further bowel function  - hold narcotics if able  - encouraged OOB, ambulation     Accidental overdose  Care per hospital medicine    Chronic low back pain  Care per hospital medicine    Acute cystitis with hematuria  Care per hospital medicine    DALIA (acute kidney injury)  Care per hospital medicine    Elevated lactic acid level  Resolved    Leukocytosis  Resolved    Mixed hyperlipidemia  Care per hospital medicine    Morbid obesity  Encourage weight loss    Type 2 diabetes mellitus with hyperglycemia, without long-term current use of insulin  Care per hospital medicine    Essential hypertension  Care per hospital medicine        Betty Ma PA-C  General Surgery  [facility]

## 2023-10-04 NOTE — SUBJECTIVE & OBJECTIVE
Interval History: Tolerated liquid diet last night. States that she had one very short episode of nausea. No vomiting. No BM. Had one episode of flatus this AM. Poor ambulation. Denies abdominal pain.    Medications:  Continuous Infusions:   dextrose 5 % and 0.45 % NaCl 100 mL/hr at 10/04/23 0442     Scheduled Meds:   aluminum-magnesium hydroxide-simethicone  30 mL Oral QID (AC & HS)    ciprofloxacin HCl  750 mg Oral Q12H    metroNIDAZOLE  500 mg Oral Q8H    pregabalin  75 mg Oral BID    sucralfate  1 g Oral Q6H     PRN Meds:acetaminophen, acetaminophen, albuterol-ipratropium, aluminum-magnesium hydroxide-simethicone, dextrose 10%, dextrose 10%, glucagon (human recombinant), glucose, glucose, hydrALAZINE, HYDROcodone-acetaminophen, insulin aspart U-100, melatonin, morphine, naloxone, polyethylene glycol, prochlorperazine, simethicone, sodium chloride 0.9%     Review of patient's allergies indicates:  No Known Allergies  Objective:     Vital Signs (Most Recent):  Temp: 97.5 °F (36.4 °C) (10/04/23 0713)  Pulse: 66 (10/04/23 0500)  Resp: 20 (10/04/23 0409)  BP: 126/60 (10/04/23 0713)  SpO2: 97 % (10/04/23 0409) Vital Signs (24h Range):  Temp:  [97.5 °F (36.4 °C)-98.2 °F (36.8 °C)] 97.5 °F (36.4 °C)  Pulse:  [61-72] 66  Resp:  [16-20] 20  SpO2:  [95 %-98 %] 97 %  BP: ()/(53-63) 126/60     Weight: 125 kg (275 lb 9.2 oz)  Body mass index is 45.86 kg/m².    Intake/Output - Last 3 Shifts         10/02 0700  10/03 0659 10/03 0700  10/04 0659 10/04 0700  10/05 0659    P.O.  240     I.V. (mL/kg)  3210.7 (25.7)     IV Piggyback  888     Total Intake(mL/kg)  4338.7 (34.7)     Urine (mL/kg/hr) 1500 (0.5)      Total Output 1500      Net -1500 +4338.7            Urine Occurrence 3 x 2 x     Stool Occurrence  0 x              Physical Exam  Constitutional:       Appearance: She is well-developed. She is obese.   HENT:      Head: Normocephalic and atraumatic.   Eyes:      Conjunctiva/sclera: Conjunctivae normal.   Neck:       Thyroid: No thyromegaly.   Cardiovascular:      Rate and Rhythm: Normal rate.   Pulmonary:      Effort: Pulmonary effort is normal. No respiratory distress.   Abdominal:      Comments: Soft, nondistended, nontender; no rebound or guarding; no tympany; well-healed previous port sites   Musculoskeletal:         General: No tenderness. Normal range of motion.      Cervical back: Normal range of motion.   Skin:     General: Skin is warm and dry.      Capillary Refill: Capillary refill takes less than 2 seconds.      Findings: No rash.   Neurological:      Mental Status: She is alert and oriented to person, place, and time.          Significant Labs:  I have reviewed all pertinent lab results within the past 24 hours.  CBC:   Recent Labs   Lab 10/04/23  0753   WBC 4.42   RBC 3.55*   HGB 10.5*   HCT 33.7*      MCV 95   MCH 29.6   MCHC 31.2*     BMP:   Recent Labs   Lab 10/04/23  0753   *      K 3.6      CO2 25   BUN 11   CREATININE 1.4   CALCIUM 8.7   MG 1.7     CMP:   Recent Labs   Lab 10/01/23  1803 10/02/23  0917 10/04/23  0753   *   < > 129*   CALCIUM 9.1   < > 8.7   ALBUMIN 3.4*  --   --    PROT 7.3  --   --       < > 140   K 3.5   < > 3.6   CO2 24   < > 25      < > 103   BUN 19   < > 11   CREATININE 2.5*   < > 1.4   ALKPHOS 108  --   --    ALT 5*  --   --    AST 10  --   --    BILITOT 1.1*  --   --     < > = values in this interval not displayed.       Significant Diagnostics:  I have reviewed all pertinent imaging results/findings within the past 24 hours.

## 2023-10-04 NOTE — ASSESSMENT & PLAN NOTE
Urinalysis revealed:   Lab Results   Component Value Date    COLORU Orange (A) 10/01/2023    APPEARANCEUA Cloudy (A) 10/01/2023    SPECGRAV 1.030 10/01/2023    PHUR 6.0 10/01/2023    PROTEINUA 3+ (A) 10/01/2023    GLUCUA 1+ (A) 10/01/2023    KETONESU Negative 10/01/2023    NITRITE Negative 10/01/2023    UROBILINOGEN >=8.0 (A) 10/01/2023    BILIRUBINUA 1+ (A) 10/01/2023    LEUKOCYTESUR Trace (A) 10/01/2023    RBCUA 8 (H) 10/01/2023    WBCUA 23 (H) 10/01/2023    BACTERIA Occasional 10/01/2023    HYALINECASTS 31 (A) 10/01/2023   Received vanco and cefepime  Plan:  -UA culture  NGTD  -Continue Abx

## 2023-10-05 LAB
POCT GLUCOSE: 100 MG/DL (ref 70–110)
POCT GLUCOSE: 102 MG/DL (ref 70–110)
POCT GLUCOSE: 112 MG/DL (ref 70–110)
POCT GLUCOSE: 121 MG/DL (ref 70–110)

## 2023-10-05 PROCEDURE — 99232 PR SUBSEQUENT HOSPITAL CARE,LEVL II: ICD-10-PCS | Mod: ,,, | Performed by: COLON & RECTAL SURGERY

## 2023-10-05 PROCEDURE — S5010 5% DEXTROSE AND 0.45% SALINE: HCPCS | Performed by: INTERNAL MEDICINE

## 2023-10-05 PROCEDURE — 25000003 PHARM REV CODE 250: Performed by: NURSE PRACTITIONER

## 2023-10-05 PROCEDURE — 99900035 HC TECH TIME PER 15 MIN (STAT)

## 2023-10-05 PROCEDURE — 25000003 PHARM REV CODE 250: Performed by: INTERNAL MEDICINE

## 2023-10-05 PROCEDURE — 99232 SBSQ HOSP IP/OBS MODERATE 35: CPT | Mod: ,,, | Performed by: COLON & RECTAL SURGERY

## 2023-10-05 PROCEDURE — 21400001 HC TELEMETRY ROOM

## 2023-10-05 RX ORDER — GLYCERIN 1 G/1
1 SUPPOSITORY RECTAL ONCE
Status: DISCONTINUED | OUTPATIENT
Start: 2023-10-05 | End: 2023-10-06 | Stop reason: HOSPADM

## 2023-10-05 RX ADMIN — CIPROFLOXACIN 750 MG: 750 TABLET ORAL at 09:10

## 2023-10-05 RX ADMIN — SUCRALFATE 1 G: 1 SUSPENSION ORAL at 05:10

## 2023-10-05 RX ADMIN — PREGABALIN 75 MG: 75 CAPSULE ORAL at 09:10

## 2023-10-05 RX ADMIN — ALUMINA, MAGNESIA, AND SIMETHICONE ORAL SUSPENSION REGULAR STRENGTH 30 ML: 1200; 1200; 120 SUSPENSION ORAL at 04:10

## 2023-10-05 RX ADMIN — METRONIDAZOLE 500 MG: 500 TABLET ORAL at 09:10

## 2023-10-05 RX ADMIN — SUCRALFATE 1 G: 1 SUSPENSION ORAL at 11:10

## 2023-10-05 RX ADMIN — METRONIDAZOLE 500 MG: 500 TABLET ORAL at 05:10

## 2023-10-05 RX ADMIN — ALUMINA, MAGNESIA, AND SIMETHICONE ORAL SUSPENSION REGULAR STRENGTH 30 ML: 1200; 1200; 120 SUSPENSION ORAL at 09:10

## 2023-10-05 RX ADMIN — ALUMINA, MAGNESIA, AND SIMETHICONE ORAL SUSPENSION REGULAR STRENGTH 30 ML: 1200; 1200; 120 SUSPENSION ORAL at 11:10

## 2023-10-05 RX ADMIN — METRONIDAZOLE 500 MG: 500 TABLET ORAL at 03:10

## 2023-10-05 RX ADMIN — SUCRALFATE 1 G: 1 SUSPENSION ORAL at 12:10

## 2023-10-05 RX ADMIN — POLYETHYLENE GLYCOL 3350 17 G: 17 POWDER, FOR SOLUTION ORAL at 09:10

## 2023-10-05 RX ADMIN — LINACLOTIDE 290 MCG: 145 CAPSULE, GELATIN COATED ORAL at 11:10

## 2023-10-05 RX ADMIN — DEXTROSE MONOHYDRATE AND SODIUM CHLORIDE: 5; .45 INJECTION, SOLUTION INTRAVENOUS at 01:10

## 2023-10-05 NOTE — ASSESSMENT & PLAN NOTE
Patient with acute kidney injury likely d/t IVVD/Dehydration. DALIA is currently being treated. Labs reviewed- Renal function/electrolytes with Estimated Creatinine Clearance: 54.7 mL/min (based on SCr of 1.4 mg/dL). according to latest data. Monitor urine output and serial BMP and adjust therapy as needed. Avoid nephrotoxins and renally dose meds for GFR listed above.     S/p  IVF  Resolved

## 2023-10-05 NOTE — PLAN OF CARE
Patient remained free of any injury throughout shift. VSS. No complaints of any pain. D5 1/2NS @ 100mL/hr. Monitoring cbg and giving coverage as needed. Call light in reach and side rails x2. Will continue with plan of care.     Problem: Diabetes Comorbidity  Goal: Blood Glucose Level Within Targeted Range  Outcome: Ongoing, Progressing     Problem: Skin Injury Risk Increased  Goal: Skin Health and Integrity  Outcome: Ongoing, Progressing     Problem: Oral Intake Inadequate (Acute Kidney Injury/Impairment)  Goal: Optimal Nutrition Intake  Outcome: Ongoing, Progressing

## 2023-10-05 NOTE — SUBJECTIVE & OBJECTIVE
Interval History:     Review of Systems   Constitutional:  Positive for fatigue. Negative for activity change, appetite change, chills, fever and unexpected weight change.   HENT:  Negative for congestion, ear pain, sore throat and trouble swallowing.    Eyes:  Negative for pain, redness and itching.   Respiratory:  Negative for cough, shortness of breath and wheezing.    Cardiovascular:  Negative for chest pain, palpitations and leg swelling.   Gastrointestinal:  Positive for constipation. Negative for abdominal distention, abdominal pain, anal bleeding, blood in stool, diarrhea, nausea, rectal pain and vomiting.   Endocrine: Negative for cold intolerance, heat intolerance and polyuria.   Genitourinary:  Negative for dysuria, flank pain, frequency and hematuria.   Musculoskeletal:  Negative for gait problem, joint swelling and neck pain.   Skin:  Negative for color change, rash and wound.   Allergic/Immunologic: Negative for environmental allergies and immunocompromised state.   Neurological:  Negative for dizziness, speech difficulty, weakness and numbness.   Psychiatric/Behavioral:  Negative for agitation, confusion and hallucinations.      Objective:     Vital Signs (Most Recent):  Temp: 98 °F (36.7 °C) (10/05/23 0725)  Pulse: 63 (10/05/23 0725)  Resp: 15 (10/05/23 0725)  BP: 131/63 (10/05/23 0725)  SpO2: 97 % (10/05/23 0725) Vital Signs (24h Range):  Temp:  [97.6 °F (36.4 °C)-98.1 °F (36.7 °C)] 98 °F (36.7 °C)  Pulse:  [60-84] 63  Resp:  [15-19] 15  SpO2:  [95 %-99 %] 97 %  BP: (124-134)/(61-68) 131/63     Weight: 125 kg (275 lb 9.2 oz)  Body mass index is 45.86 kg/m².    Intake/Output Summary (Last 24 hours) at 10/5/2023 1118  Last data filed at 10/5/2023 0442  Gross per 24 hour   Intake 3320.44 ml   Output 0 ml   Net 3320.44 ml         Physical Exam  Constitutional:       Appearance: She is well-developed. She is obese.   HENT:      Head: Normocephalic and atraumatic.   Eyes:      Conjunctiva/sclera:  Conjunctivae normal.   Neck:      Thyroid: No thyromegaly.   Cardiovascular:      Rate and Rhythm: Normal rate.   Pulmonary:      Effort: Pulmonary effort is normal. No respiratory distress.   Abdominal:      Comments: Soft, nondistended, nontender; no rebound or guarding; no tympany; well-healed previous port sites   Musculoskeletal:         General: No tenderness. Normal range of motion.      Cervical back: Normal range of motion.   Skin:     General: Skin is warm and dry.      Capillary Refill: Capillary refill takes less than 2 seconds.      Findings: No rash.   Neurological:      Mental Status: She is alert and oriented to person, place, and time.             Significant Labs: All pertinent labs within the past 24 hours have been reviewed.  Recent Lab Results  (Last 5 results in the past 24 hours)        10/05/23  0503   10/05/23  0027   10/04/23  2112   10/04/23  1721   10/04/23  1135        POCT Glucose 121   102   127   100   95                              Significant Imaging: I have reviewed all pertinent imaging results/findings within the past 24 hours.

## 2023-10-05 NOTE — PROGRESS NOTES
[facility] Medicine  Progress Note    Patient Name: Roslyn Conde  MRN: [id number]  Patient Class: IP- Inpatient   Admission Date: 10/1/2023  Length of Stay: 4 days  Attending Physician: Stephan Keating,*  Primary Care Provider: Jed Munoz MD        Subjective:     Principal Problem:Enteritis        HPI:  Roslyn Conde is a 63 y.o. female with a PMH  has a past medical history of Chronic back pain, Diabetes mellitus, type 2, and Hypertension.  Presented to the ER for evaluation of dizziness with syncopal episode earlier today.  Patient reports she was on the commode when she had a large bowel movement.  Patient states that she was dizzy and felt like she was going to pass out while on the toilet.  Patient stated when she stood up to walk she had loss of consciousness.  Associated symptoms include generalized abdominal pain generalized weakness/fatigue and 1 episode of nonbloody vomitus and sweating.  Patient states that she was sitting pain management for back and knee pain.  Patient states her pain management doctor recently increased her Percocet to 4 times daily urine due to having persistent pain of her her left knee secondary to recent TKA.  Patient received Narcan with improvement of symptoms.  Patient states she takes her medication as prescribed.  Denies previous issues with pain medication.  States her generalized abdominal pain at 0/10.  Denies any other complaints at this time.    ER workup revealed leukocytosis of 16.77, BUN/creatinine of 2.5/21, CBG to 12 mg/dL, lactic acid of 3.4 with repeat level of 3.3 after IVF.  UA positive for cystitis.  All remaining blood work unremarkable.  Patient received 2 g of vancomycin, 1 g of cefepime, 500 cc bolus of LR, sepsis bolus of LR at 2, 538 cc, and 3 doses of 0.4 mg of Narcan.  EKG revealed sinus tachycardia with a ventricular rate of 115 beats per minute with a QT/QTC of 456/630.  CT abdomen and pelvis revealed:[ Prominent  small bowel loops in the pelvis measuring up to 33 mm in diameter with mild mucosal thickening suggestive of enteritis and partial small bowel obstruction. Mild associated mesenteric edema. Colonic loops of bowel are decompressed. Mild mucosal thickening of the transverse and right colon.  Correlate clinically for colitis]. Hospital Medicine consulted to admit patient for small-bowel obstruction.  Patient family at bedside are in agreement with treatment plan.  Patient will be admitted under inpatient status.    PCP: Jed Munoz      Overview/Hospital Course:  10/2: pt reports having bowel movement overnight. Complains of nausea however no vomiting. Will repeat KUB  10/3 She denies nay new complains . She is toelrating liquid diet . Surgery rec no surgical intervention at this time and cot medical tx   10/4 She is tolerating full liquid diet . No BM since admission .  The Blood sugar has been borderline low normal  , IVF changed to  D% 1/2 NS.   10/5 The CBG has been stable , plan to d/c IVF d5 1/2 NS. She is tolerating  full diet . No BM yet but is passing gas . Laxative restarted .       Interval History:     Review of Systems   Constitutional:  Positive for fatigue. Negative for activity change, appetite change, chills, fever and unexpected weight change.   HENT:  Negative for congestion, ear pain, sore throat and trouble swallowing.    Eyes:  Negative for pain, redness and itching.   Respiratory:  Negative for cough, shortness of breath and wheezing.    Cardiovascular:  Negative for chest pain, palpitations and leg swelling.   Gastrointestinal:  Positive for constipation. Negative for abdominal distention, abdominal pain, anal bleeding, blood in stool, diarrhea, nausea, rectal pain and vomiting.   Endocrine: Negative for cold intolerance, heat intolerance and polyuria.   Genitourinary:  Negative for dysuria, flank pain, frequency and hematuria.   Musculoskeletal:  Negative for gait problem, joint swelling  and neck pain.   Skin:  Negative for color change, rash and wound.   Allergic/Immunologic: Negative for environmental allergies and immunocompromised state.   Neurological:  Negative for dizziness, speech difficulty, weakness and numbness.   Psychiatric/Behavioral:  Negative for agitation, confusion and hallucinations.      Objective:     Vital Signs (Most Recent):  Temp: 98 °F (36.7 °C) (10/05/23 0725)  Pulse: 63 (10/05/23 0725)  Resp: 15 (10/05/23 0725)  BP: 131/63 (10/05/23 0725)  SpO2: 97 % (10/05/23 0725) Vital Signs (24h Range):  Temp:  [97.6 °F (36.4 °C)-98.1 °F (36.7 °C)] 98 °F (36.7 °C)  Pulse:  [60-84] 63  Resp:  [15-19] 15  SpO2:  [95 %-99 %] 97 %  BP: (124-134)/(61-68) 131/63     Weight: 125 kg (275 lb 9.2 oz)  Body mass index is 45.86 kg/m².    Intake/Output Summary (Last 24 hours) at 10/5/2023 1118  Last data filed at 10/5/2023 0442  Gross per 24 hour   Intake 3320.44 ml   Output 0 ml   Net 3320.44 ml         Physical Exam  Constitutional:       Appearance: She is well-developed. She is obese.   HENT:      Head: Normocephalic and atraumatic.   Eyes:      Conjunctiva/sclera: Conjunctivae normal.   Neck:      Thyroid: No thyromegaly.   Cardiovascular:      Rate and Rhythm: Normal rate.   Pulmonary:      Effort: Pulmonary effort is normal. No respiratory distress.   Abdominal:      Comments: Soft, nondistended, nontender; no rebound or guarding; no tympany; well-healed previous port sites   Musculoskeletal:         General: No tenderness. Normal range of motion.      Cervical back: Normal range of motion.   Skin:     General: Skin is warm and dry.      Capillary Refill: Capillary refill takes less than 2 seconds.      Findings: No rash.   Neurological:      Mental Status: She is alert and oriented to person, place, and time.             Significant Labs: All pertinent labs within the past 24 hours have been reviewed.  Recent Lab Results  (Last 5 results in the past 24 hours)        10/05/23  0509    10/05/23  0027   10/04/23  2112   10/04/23  1721   10/04/23  1135        POCT Glucose 121   102   127   100   95                              Significant Imaging: I have reviewed all pertinent imaging results/findings within the past 24 hours.      Assessment/Plan:      * Enteritis  Leukocytosis of 16.77 with elevated lactic acid level of 3.4 with revealed 3.3.  Plan:  -npo  -ivfs  -analgesics prn  -antiemetics prn  -gensurg consult in am    10/2:  Partial SBO on CT scan  Pt reported having bowel movement overnight  No nausea and vomiting currently  No NG tube in place  Will plan to place NG tube should pt become symptomatic   Surgery consulted on case  Cont NPO  IVF     change IVAB to po   Tolerating diet  Awaiting BM returned       Accidental overdose  Received 3 doses of 0.4 mg Narcan with response.  Excessive sedation likely a result from taking pain medication in setting of IVVD. Currently voices no complaints at this time.   Plan:  -hold narcotics   -tele monitoring  -monitor vitals  -supplemental O2 as needed  -additional narcan if needed    Chronic low back pain  Compliant with home meds.  Plan:  -continue lyrica  -hold narcotics due to accidental overdose      Acute cystitis with hematuria  Urinalysis revealed:   Lab Results   Component Value Date    COLORU Orange (A) 10/01/2023    APPEARANCEUA Cloudy (A) 10/01/2023    SPECGRAV 1.030 10/01/2023    PHUR 6.0 10/01/2023    PROTEINUA 3+ (A) 10/01/2023    GLUCUA 1+ (A) 10/01/2023    KETONESU Negative 10/01/2023    NITRITE Negative 10/01/2023    UROBILINOGEN >=8.0 (A) 10/01/2023    BILIRUBINUA 1+ (A) 10/01/2023    LEUKOCYTESUR Trace (A) 10/01/2023    RBCUA 8 (H) 10/01/2023    WBCUA 23 (H) 10/01/2023    BACTERIA Occasional 10/01/2023    HYALINECASTS 31 (A) 10/01/2023   Received vanco and cefepime  Plan:  -UA culture  NGTD  -Continue Abx          DALIA (acute kidney injury)  Patient with acute kidney injury likely d/t IVVD/Dehydration. DALIA is currently being treated.  Labs reviewed- Renal function/electrolytes with Estimated Creatinine Clearance: 54.7 mL/min (based on SCr of 1.4 mg/dL). according to latest data. Monitor urine output and serial BMP and adjust therapy as needed. Avoid nephrotoxins and renally dose meds for GFR listed above.     S/p  IVF  Resolved     Elevated lactic acid level  See above      Leukocytosis  Possibly reactive  Will cont IVF  Empiric cefepime for now         Mixed hyperlipidemia  Patient is chronically on statin.will not continue for now. Last Lipid Panel:   Lab Results   Component Value Date    CHOL 119 03/29/2023    HDL 48 03/29/2023    LDLCALC 51 03/29/2023    TRIG 110 03/29/2023    CHOLHDL 31.3 12/12/2012     Plan:  -Continue home medication  -low fat/low calorie diet        Morbid obesity  Body mass index is 45.86 kg/m². Morbid obesity complicates all aspects of disease management from diagnostic modalities to treatment. Weight loss encouraged and health benefits explained to patient.         Type 2 diabetes mellitus with hyperglycemia, without long-term current use of insulin  Most recent A1c measuring   Hemoglobin A1C   Date Value Ref Range Status   02/23/2020 6.0 <=6.5 % Final   04/11/2013 6.2 4.0 - 6.2 % Final     Plan:  -SSI  -Accu-checks   -Hypoglycemic protocol   -Hold oral antihyperglycemics while inpatient           Essential hypertension  Currently normotensive. BP usually well controlled per patient with home medications.  Plan:  -Optimize pain control   -Hold home medications (lisinopril, hctz) due to DALIA, titrate as needed   -Monitor BP  -Low salt/cardiac diet when not NPO  -IV hydralazine prn for SBP>160 or DBP>90             VTE Risk Mitigation (From admission, onward)         Ordered     Reason for No Pharmacological VTE Prophylaxis  Once        Question:  Reasons:  Answer:  Physician Provided (leave comment)    10/01/23 2342     IP VTE HIGH RISK PATIENT  Once         10/01/23 2342     Place sequential compression device  Until  discontinued         10/01/23 2342                Discharge Planning   ANDRÉS: 10/5/2023     Code Status: Full Code   Is the patient medically ready for discharge?:     Reason for patient still in hospital (select all that apply): Treatment  Discharge Plan A: Home with family                  Stephan Stone MD  Department of Hospital Medicine   '[facility]

## 2023-10-05 NOTE — ASSESSMENT & PLAN NOTE
Leukocytosis of 16.77 with elevated lactic acid level of 3.4 with revealed 3.3.  Plan:  -npo  -ivfs  -analgesics prn  -antiemetics prn  -gensurg consult in am    10/2:  Partial SBO on CT scan  Pt reported having bowel movement overnight  No nausea and vomiting currently  No NG tube in place  Will plan to place NG tube should pt become symptomatic   Surgery consulted on case  Cont NPO  IVF     change IVAB to po   Tolerating diet  Awaiting BM returned

## 2023-10-05 NOTE — PLAN OF CARE
[facility] - Med Surg  Discharge Final Note    Primary Care Provider: Jed Munoz MD    Expected Discharge Date: 10/5/2023    Final Discharge Note (most recent)       Final Note - 10/05/23 1008          Final Note    Assessment Type Final Discharge Note     Anticipated Discharge Disposition Home or Self Care

## 2023-10-05 NOTE — ASSESSMENT & PLAN NOTE
62yo F with enteritis/colitis vs pSBO    - No acute surgical intervention required at this time  - patient not clinically obstructed and not showing symptoms of partial obstruction. Patient with ongoing bowel function, no vomiting and no abdominal distention. Exam and history more consistent with enteritis/colitis or pseudo-obstruction from narcotic abuse   - okay for regular diet from surgical standpoint  - hold narcotics if able  - encouraged OOB, ambulation

## 2023-10-05 NOTE — PHYSICIAN QUERY
PT Name: Roslyn Conde  MR #: [id number]     DOCUMENTATION CLARIFICATION     CDS/: Genet Lo               Contact information: [email or web address]  This form is a permanent document in the medical record.     Query Date: October 5, 2023    By submitting this query, we are merely seeking further clarification of documentation.  Please utilize your independent clinical judgment when addressing the question(s) below.  Provider, please provide the integumentary diagnosis related to the documentation of Coccyx:   The Medical Record contains the following:    10/03 Wound Care Nurse's Consult Note:  Encouraged patient to turn and reposition every 2 hours. Also ordered MAGNOLIA pump or waffle top to mattress. Patient states she had knee surgery the beginning of September and after having the surgery she wasn't moving around much and on her back and bottom most of the time. Patient states she noticed she had a sore area to her buttock prior to admit and when she wiped she had blood on toliet paper. Because the location of wound I cleaned area and applied barrier cream.   Altered Skin Integrity medial Coccyx  Full thickness tissue loss. Base is covered by slough and/or eschar in the wound bed  Date First Assessed/Time First Assessed: 10/02/23 1915   Altered Skin Integrity Present on Admission - Did Patient arrive to the hospital with altered skin?: yes                 The clinical guidelines noted are only a system guideline. It does not replace the providers clinical judgment.    Per the National Pressure Injury Advisory Panel:   A pressure injury is localized damage to the skin and underlying soft tissue usually over a bony prominence or related to a medical or other device. The injury can present as intact skin or an open ulcer and may be painful. The injury occurs as a result of intense and/or prolonged pressure or pressure in combination with shear. The tolerance of soft tissue for pressure and shear may  also be affected by microclimate, nutrition, perfusion, co-morbidities and condition of the soft tissue.       Stage 1 Pressure Injury:  Intact skin with a localized area of non-blanchable erythema, which may appear differently in darkly pigmented skin. Color changes do not include purple or maroon discoloration; these may indicate deep tissue pressure injury.    Stage 2 Pressure Injury:  Partial-thickness loss of skin with exposed dermis. The wound bed is viable, pink or red, moist, and may also present as an intact or ruptured serum-filled blister.    Stage 3 Pressure Injury:  Full-thickness loss of skin, in which adipose (fat) is visible in the ulcer and granulation tissue and epibole (rolled wound edges) are often present. Slough and/or eschar may be visible. Undermining and tunneling may occur.    Stage 4 Pressure Injury:  Full-thickness skin and tissue loss with exposed or directly palpable fascia, muscle, tendon, ligament, cartilage or bone in the ulcer. Slough and/or eschar may be visible. Epibole (rolled edges), undermining and/or tunneling often occur.    Unstageable Pressure Injury:  Full-thickness skin and tissue loss in which the extent of tissue damage within the ulcer cannot be confirmed because it is obscured by slough or eschar. If slough or eschar is removed, a Stage 3 or Stage 4 pressure injury will be revealed.    Deep Tissue Pressure Injury:  Intact or non-intact skin with localized area of persistent non-blanchable deep red, maroon, purple discoloration or epidermal separation revealing a dark wound bed or blood filled blister. This injury results from intense and/or prolonged pressure and shear forces at the bone-muscle interface. The wound may evolve rapidly to reveal the actual extent of tissue injury, or may resolve without tissue loss. If necrotic tissue, subcutaneous tissue, granulation tissue, fascia, muscle or other underlying structures are visible, this indicates a full thickness  pressure injury (Unstageable, Stage 3 or Stage 4). Do not use DTPI to describe vascular, traumatic, neuropathic, or dermatologic conditions.       Provider, please provide the integumentary diagnosis related to the documentation of Coccyx:     [ x  ] Pressure Injury/Decubitus Ulcer, Stage 3   [   ] Pressure Injury/Decubitus Ulcer, Other Stage, please specify:  _____   [   ] Other Integumentary Diagnosis (please specify):______________       Please document in your progress notes daily for the duration of treatment until resolved and include in your discharge summary.    Reference:    LAURENCE Tinsley., VIJAYA Brooks., Goldberg, M., ONESIMO العراقي., ONESIMO Pulido., & AGUSTINA Coles. (2016). Revised National Pressure Ulcer Advisory Panel Pressure Injury Staging System: Revised Pressure Injury Staging System. J Wound Ostomy Continence Nurs, 43(6), 585-597. doi:10.1097/won.[id number]    Form No.[id number]

## 2023-10-05 NOTE — PLAN OF CARE
Pt AAOx4. Accu checks Q4hrs. Requires min assist with ADL's. Remains free from incident/injury. Call light in reach. All active orders reviewed. Chart check complete.

## 2023-10-05 NOTE — PLAN OF CARE
[facility] - Med Surg  Discharge Reassessment    Primary Care Provider: Jed Munoz MD    Expected Discharge Date:     Reassessment (most recent)       Discharge Reassessment - 10/05/23 0838          Discharge Reassessment    Assessment Type Discharge Planning Reassessment     Did the patient's condition or plan change since previous assessment? No     Discharge Plan discussed with: Patient     Communicated ANDRÉS with patient/caregiver Date not available/Unable to determine     Discharge Plan A Home with family

## 2023-10-05 NOTE — SUBJECTIVE & OBJECTIVE
Interval History:  Tolerating diet without nausea or vomiting.  Passing flatus but no bowel movement.  No abdominal pain or distention.    Medications:  Continuous Infusions:  Scheduled Meds:   aluminum-magnesium hydroxide-simethicone  30 mL Oral QID (AC & HS)    ciprofloxacin HCl  750 mg Oral Q12H    [START ON 10/6/2023] linaCLOtide  290 mcg Oral Before breakfast    metroNIDAZOLE  500 mg Oral Q8H    polyethylene glycol  17 g Oral Daily    pregabalin  75 mg Oral BID    sucralfate  1 g Oral Q6H     PRN Meds:acetaminophen, acetaminophen, albuterol-ipratropium, aluminum-magnesium hydroxide-simethicone, dextrose 10%, dextrose 10%, glucagon (human recombinant), glucose, glucose, hydrALAZINE, HYDROcodone-acetaminophen, insulin aspart U-100, melatonin, morphine, naloxone, prochlorperazine, simethicone, sodium chloride 0.9%     Review of patient's allergies indicates:  No Known Allergies  Objective:     Vital Signs (Most Recent):  Temp: 98 °F (36.7 °C) (10/05/23 0725)  Pulse: 63 (10/05/23 0725)  Resp: 15 (10/05/23 0725)  BP: 131/63 (10/05/23 0725)  SpO2: 97 % (10/05/23 0725) Vital Signs (24h Range):  Temp:  [97.6 °F (36.4 °C)-98.1 °F (36.7 °C)] 98 °F (36.7 °C)  Pulse:  [60-84] 63  Resp:  [15-19] 15  SpO2:  [95 %-99 %] 97 %  BP: (124-134)/(61-68) 131/63     Weight: 125 kg (275 lb 9.2 oz)  Body mass index is 45.86 kg/m².    Intake/Output - Last 3 Shifts         10/03 0700  10/04 0659 10/04 0700  10/05 0659 10/05 0700  10/06 0659    P.O. 240 360     I.V. (mL/kg) 3210.7 (25.7) 2730.4 (21.8)     IV Piggyback 888 230     Total Intake(mL/kg) 4338.7 (34.7) 3320.4 (26.6)     Urine (mL/kg/hr)  0 (0)     Emesis/NG output  0     Stool  0     Total Output  0     Net +4338.7 +3320.4            Urine Occurrence 2 x 6 x     Stool Occurrence 0 x 0 x              Physical Exam  Constitutional:       Appearance: She is well-developed. She is obese.   HENT:      Head: Normocephalic and atraumatic.   Eyes:      Conjunctiva/sclera: Conjunctivae  normal.   Neck:      Thyroid: No thyromegaly.   Cardiovascular:      Rate and Rhythm: Normal rate.   Pulmonary:      Effort: Pulmonary effort is normal. No respiratory distress.   Abdominal:      Comments: Soft, nondistended, nontender; no rebound or guarding; no tympany; well-healed previous port sites   Musculoskeletal:         General: No tenderness. Normal range of motion.      Cervical back: Normal range of motion.   Skin:     General: Skin is warm and dry.      Capillary Refill: Capillary refill takes less than 2 seconds.      Findings: No rash.   Neurological:      Mental Status: She is alert and oriented to person, place, and time.          Significant Labs:  I have reviewed all pertinent lab results within the past 24 hours.  CBC:   Recent Labs   Lab 10/04/23  0753   WBC 4.42   RBC 3.55*   HGB 10.5*   HCT 33.7*      MCV 95   MCH 29.6   MCHC 31.2*     BMP:   Recent Labs   Lab 10/04/23  0753   *      K 3.6      CO2 25   BUN 11   CREATININE 1.4   CALCIUM 8.7   MG 1.7     CMP:   Recent Labs   Lab 10/01/23  1803 10/02/23  0917 10/04/23  0753   *   < > 129*   CALCIUM 9.1   < > 8.7   ALBUMIN 3.4*  --   --    PROT 7.3  --   --       < > 140   K 3.5   < > 3.6   CO2 24   < > 25      < > 103   BUN 19   < > 11   CREATININE 2.5*   < > 1.4   ALKPHOS 108  --   --    ALT 5*  --   --    AST 10  --   --    BILITOT 1.1*  --   --     < > = values in this interval not displayed.     LFTs:   Recent Labs   Lab 10/01/23  1803   ALT 5*   AST 10   ALKPHOS 108   BILITOT 1.1*   PROT 7.3   ALBUMIN 3.4*       Significant Diagnostics:  I have reviewed all pertinent imaging results/findings within the past 24 hours.

## 2023-10-05 NOTE — PROGRESS NOTES
[facility]  General Surgery  Progress Note    Subjective:     Interval History:  Tolerating diet without nausea or vomiting.  Passing flatus but no bowel movement.  No abdominal pain or distention.    Medications:  Continuous Infusions:  Scheduled Meds:   aluminum-magnesium hydroxide-simethicone  30 mL Oral QID (AC & HS)    ciprofloxacin HCl  750 mg Oral Q12H    [START ON 10/6/2023] linaCLOtide  290 mcg Oral Before breakfast    metroNIDAZOLE  500 mg Oral Q8H    polyethylene glycol  17 g Oral Daily    pregabalin  75 mg Oral BID    sucralfate  1 g Oral Q6H     PRN Meds:acetaminophen, acetaminophen, albuterol-ipratropium, aluminum-magnesium hydroxide-simethicone, dextrose 10%, dextrose 10%, glucagon (human recombinant), glucose, glucose, hydrALAZINE, HYDROcodone-acetaminophen, insulin aspart U-100, melatonin, morphine, naloxone, prochlorperazine, simethicone, sodium chloride 0.9%     Review of patient's allergies indicates:  No Known Allergies  Objective:     Vital Signs (Most Recent):  Temp: 98 °F (36.7 °C) (10/05/23 0725)  Pulse: 63 (10/05/23 0725)  Resp: 15 (10/05/23 0725)  BP: 131/63 (10/05/23 0725)  SpO2: 97 % (10/05/23 0725) Vital Signs (24h Range):  Temp:  [97.6 °F (36.4 °C)-98.1 °F (36.7 °C)] 98 °F (36.7 °C)  Pulse:  [60-84] 63  Resp:  [15-19] 15  SpO2:  [95 %-99 %] 97 %  BP: (124-134)/(61-68) 131/63     Weight: 125 kg (275 lb 9.2 oz)  Body mass index is 45.86 kg/m².    Intake/Output - Last 3 Shifts         10/03 0700  10/04 0659 10/04 0700  10/05 0659 10/05 0700  10/06 0659    P.O. 240 360     I.V. (mL/kg) 3210.7 (25.7) 2730.4 (21.8)     IV Piggyback 888 230     Total Intake(mL/kg) 4338.7 (34.7) 3320.4 (26.6)     Urine (mL/kg/hr)  0 (0)     Emesis/NG output  0     Stool  0     Total Output  0     Net +4338.7 +3320.4            Urine Occurrence 2 x 6 x     Stool Occurrence 0 x 0 x              Physical Exam  Constitutional:       Appearance: She is well-developed. She is obese.   HENT:      Head:  Normocephalic and atraumatic.   Eyes:      Conjunctiva/sclera: Conjunctivae normal.   Neck:      Thyroid: No thyromegaly.   Cardiovascular:      Rate and Rhythm: Normal rate.   Pulmonary:      Effort: Pulmonary effort is normal. No respiratory distress.   Abdominal:      Comments: Soft, nondistended, nontender; no rebound or guarding; no tympany; well-healed previous port sites   Musculoskeletal:         General: No tenderness. Normal range of motion.      Cervical back: Normal range of motion.   Skin:     General: Skin is warm and dry.      Capillary Refill: Capillary refill takes less than 2 seconds.      Findings: No rash.   Neurological:      Mental Status: She is alert and oriented to person, place, and time.          Significant Labs:  I have reviewed all pertinent lab results within the past 24 hours.  CBC:   Recent Labs   Lab 10/04/23  0753   WBC 4.42   RBC 3.55*   HGB 10.5*   HCT 33.7*      MCV 95   MCH 29.6   MCHC 31.2*     BMP:   Recent Labs   Lab 10/04/23  0753   *      K 3.6      CO2 25   BUN 11   CREATININE 1.4   CALCIUM 8.7   MG 1.7     CMP:   Recent Labs   Lab 10/01/23  1803 10/02/23  0917 10/04/23  0753   *   < > 129*   CALCIUM 9.1   < > 8.7   ALBUMIN 3.4*  --   --    PROT 7.3  --   --       < > 140   K 3.5   < > 3.6   CO2 24   < > 25      < > 103   BUN 19   < > 11   CREATININE 2.5*   < > 1.4   ALKPHOS 108  --   --    ALT 5*  --   --    AST 10  --   --    BILITOT 1.1*  --   --     < > = values in this interval not displayed.     LFTs:   Recent Labs   Lab 10/01/23  1803   ALT 5*   AST 10   ALKPHOS 108   BILITOT 1.1*   PROT 7.3   ALBUMIN 3.4*       Significant Diagnostics:  I have reviewed all pertinent imaging results/findings within the past 24 hours.    Assessment/Plan:     * Enteritis  62yo F with enteritis/colitis vs pSBO    - No acute surgical intervention required at this time  - patient not clinically obstructed and not showing symptoms of partial  obstruction. Patient with ongoing bowel function, no vomiting and no abdominal distention. Exam and history more consistent with enteritis/colitis or pseudo-obstruction from narcotic abuse   - okay for regular diet from surgical standpoint  - hold narcotics if able  - encouraged OOB, ambulation     Accidental overdose  Care per hospital medicine    Chronic low back pain  Care per hospital medicine    Acute cystitis with hematuria  Care per hospital medicine    DALIA (acute kidney injury)  Care per hospital medicine    Elevated lactic acid level  Resolved    Leukocytosis  Resolved    Mixed hyperlipidemia  Care per hospital medicine    Morbid obesity  Encourage weight loss    Type 2 diabetes mellitus with hyperglycemia, without long-term current use of insulin  Care per hospital medicine    Essential hypertension  Care per hospital medicine        Enrique Foy MD  General Surgery  [facility]

## 2023-10-06 VITALS
HEIGHT: 65 IN | WEIGHT: 275.56 LBS | RESPIRATION RATE: 18 BRPM | OXYGEN SATURATION: 97 % | BODY MASS INDEX: 45.91 KG/M2 | DIASTOLIC BLOOD PRESSURE: 64 MMHG | SYSTOLIC BLOOD PRESSURE: 136 MMHG | TEMPERATURE: 98 F | HEART RATE: 64 BPM

## 2023-10-06 LAB
ANION GAP SERPL CALC-SCNC: 10 MMOL/L (ref 8–16)
BASOPHILS # BLD AUTO: 0.01 K/UL (ref 0–0.2)
BASOPHILS NFR BLD: 0.2 % (ref 0–1.9)
BUN SERPL-MCNC: 8 MG/DL (ref 8–23)
CALCIUM SERPL-MCNC: 8.8 MG/DL (ref 8.7–10.5)
CHLORIDE SERPL-SCNC: 101 MMOL/L (ref 95–110)
CO2 SERPL-SCNC: 33 MMOL/L (ref 23–29)
CREAT SERPL-MCNC: 1.1 MG/DL (ref 0.5–1.4)
DIFFERENTIAL METHOD: ABNORMAL
EOSINOPHIL # BLD AUTO: 0.4 K/UL (ref 0–0.5)
EOSINOPHIL NFR BLD: 9 % (ref 0–8)
ERYTHROCYTE [DISTWIDTH] IN BLOOD BY AUTOMATED COUNT: 13.3 % (ref 11.5–14.5)
EST. GFR  (NO RACE VARIABLE): 56 ML/MIN/1.73 M^2
GLUCOSE SERPL-MCNC: 108 MG/DL (ref 70–110)
HCT VFR BLD AUTO: 32.8 % (ref 37–48.5)
HGB BLD-MCNC: 10.2 G/DL (ref 12–16)
IMM GRANULOCYTES # BLD AUTO: 0.02 K/UL (ref 0–0.04)
IMM GRANULOCYTES NFR BLD AUTO: 0.5 % (ref 0–0.5)
LYMPHOCYTES # BLD AUTO: 0.9 K/UL (ref 1–4.8)
LYMPHOCYTES NFR BLD: 21.3 % (ref 18–48)
MCH RBC QN AUTO: 28.5 PG (ref 27–31)
MCHC RBC AUTO-ENTMCNC: 31.1 G/DL (ref 32–36)
MCV RBC AUTO: 92 FL (ref 82–98)
MONOCYTES # BLD AUTO: 0.3 K/UL (ref 0.3–1)
MONOCYTES NFR BLD: 6.7 % (ref 4–15)
NEUTROPHILS # BLD AUTO: 2.7 K/UL (ref 1.8–7.7)
NEUTROPHILS NFR BLD: 62.3 % (ref 38–73)
NRBC BLD-RTO: 0 /100 WBC
PLATELET # BLD AUTO: 202 K/UL (ref 150–450)
PMV BLD AUTO: 9.3 FL (ref 9.2–12.9)
POCT GLUCOSE: 104 MG/DL (ref 70–110)
POCT GLUCOSE: 79 MG/DL (ref 70–110)
POTASSIUM SERPL-SCNC: 2.9 MMOL/L (ref 3.5–5.1)
RBC # BLD AUTO: 3.58 M/UL (ref 4–5.4)
SODIUM SERPL-SCNC: 144 MMOL/L (ref 136–145)
WBC # BLD AUTO: 4.32 K/UL (ref 3.9–12.7)

## 2023-10-06 PROCEDURE — 94761 N-INVAS EAR/PLS OXIMETRY MLT: CPT

## 2023-10-06 PROCEDURE — 85025 COMPLETE CBC W/AUTO DIFF WBC: CPT | Performed by: INTERNAL MEDICINE

## 2023-10-06 PROCEDURE — 25000003 PHARM REV CODE 250: Performed by: INTERNAL MEDICINE

## 2023-10-06 PROCEDURE — 63600175 PHARM REV CODE 636 W HCPCS: Performed by: INTERNAL MEDICINE

## 2023-10-06 PROCEDURE — 36415 COLL VENOUS BLD VENIPUNCTURE: CPT | Performed by: INTERNAL MEDICINE

## 2023-10-06 PROCEDURE — 99232 SBSQ HOSP IP/OBS MODERATE 35: CPT | Mod: ,,,

## 2023-10-06 PROCEDURE — 80048 BASIC METABOLIC PNL TOTAL CA: CPT | Performed by: INTERNAL MEDICINE

## 2023-10-06 PROCEDURE — 99232 PR SUBSEQUENT HOSPITAL CARE,LEVL II: ICD-10-PCS | Mod: ,,,

## 2023-10-06 PROCEDURE — 25000003 PHARM REV CODE 250: Performed by: NURSE PRACTITIONER

## 2023-10-06 RX ORDER — POTASSIUM CHLORIDE 7.45 MG/ML
10 INJECTION INTRAVENOUS
Status: COMPLETED | OUTPATIENT
Start: 2023-10-06 | End: 2023-10-06

## 2023-10-06 RX ORDER — METRONIDAZOLE 500 MG/1
500 TABLET ORAL EVERY 8 HOURS
Qty: 21 TABLET | Refills: 0 | Status: SHIPPED | OUTPATIENT
Start: 2023-10-06 | End: 2023-10-13

## 2023-10-06 RX ORDER — CIPROFLOXACIN 750 MG/1
750 TABLET, FILM COATED ORAL EVERY 12 HOURS
Qty: 14 TABLET | Refills: 0 | Status: SHIPPED | OUTPATIENT
Start: 2023-10-06 | End: 2023-10-13

## 2023-10-06 RX ADMIN — POTASSIUM CHLORIDE 10 MEQ: 7.46 INJECTION, SOLUTION INTRAVENOUS at 09:10

## 2023-10-06 RX ADMIN — SUCRALFATE 1 G: 1 SUSPENSION ORAL at 12:10

## 2023-10-06 RX ADMIN — CIPROFLOXACIN 750 MG: 750 TABLET ORAL at 09:10

## 2023-10-06 RX ADMIN — PREGABALIN 75 MG: 75 CAPSULE ORAL at 09:10

## 2023-10-06 RX ADMIN — POTASSIUM CHLORIDE 10 MEQ: 7.46 INJECTION, SOLUTION INTRAVENOUS at 11:10

## 2023-10-06 RX ADMIN — POLYETHYLENE GLYCOL 3350 17 G: 17 POWDER, FOR SOLUTION ORAL at 09:10

## 2023-10-06 RX ADMIN — LINACLOTIDE 290 MCG: 145 CAPSULE, GELATIN COATED ORAL at 05:10

## 2023-10-06 RX ADMIN — POTASSIUM CHLORIDE 10 MEQ: 7.46 INJECTION, SOLUTION INTRAVENOUS at 12:10

## 2023-10-06 RX ADMIN — METRONIDAZOLE 500 MG: 500 TABLET ORAL at 02:10

## 2023-10-06 RX ADMIN — METRONIDAZOLE 500 MG: 500 TABLET ORAL at 05:10

## 2023-10-06 RX ADMIN — SUCRALFATE 1 G: 1 SUSPENSION ORAL at 05:10

## 2023-10-06 RX ADMIN — ACETAMINOPHEN 650 MG: 325 TABLET ORAL at 05:10

## 2023-10-06 RX ADMIN — SUCRALFATE 1 G: 1 SUSPENSION ORAL at 11:10

## 2023-10-06 RX ADMIN — ALUMINA, MAGNESIA, AND SIMETHICONE ORAL SUSPENSION REGULAR STRENGTH 30 ML: 1200; 1200; 120 SUSPENSION ORAL at 11:10

## 2023-10-06 RX ADMIN — POTASSIUM BICARBONATE 25 MEQ: 978 TABLET, EFFERVESCENT ORAL at 09:10

## 2023-10-06 NOTE — NURSING
Pt discharged home. She is vitally stable. Discharge instruction reviewed. Home Rxs delivered from pharmacy.

## 2023-10-06 NOTE — DISCHARGE SUMMARY
[facility] Medicine  Discharge Summary      Patient Name: Roslyn Conde  MRN: [id number]  DEDE: [id number]  Patient Class: IP- Inpatient  Admission Date: 10/1/2023  Hospital Length of Stay: 5 days  Discharge Date and Time:  10/06/2023 11:13 AM  Attending Physician: Stephan Keating,*   Discharging Provider: Stephan Stone MD  Primary Care Provider: Jed Munoz MD    Primary Care Team: [facility] MEDICINE D    HPI:   Roslyn Conde is a 63 y.o. female with a PMH  has a past medical history of Chronic back pain, Diabetes mellitus, type 2, and Hypertension.  Presented to the ER for evaluation of dizziness with syncopal episode earlier today.  Patient reports she was on the commode when she had a large bowel movement.  Patient states that she was dizzy and felt like she was going to pass out while on the toilet.  Patient stated when she stood up to walk she had loss of consciousness.  Associated symptoms include generalized abdominal pain generalized weakness/fatigue and 1 episode of nonbloody vomitus and sweating.  Patient states that she was sitting pain management for back and knee pain.  Patient states her pain management doctor recently increased her Percocet to 4 times daily urine due to having persistent pain of her her left knee secondary to recent TKA.  Patient received Narcan with improvement of symptoms.  Patient states she takes her medication as prescribed.  Denies previous issues with pain medication.  States her generalized abdominal pain at 0/10.  Denies any other complaints at this time.    ER workup revealed leukocytosis of 16.77, BUN/creatinine of 2.5/21, CBG to 12 mg/dL, lactic acid of 3.4 with repeat level of 3.3 after IVF.  UA positive for cystitis.  All remaining blood work unremarkable.  Patient received 2 g of vancomycin, 1 g of cefepime, 500 cc bolus of LR, sepsis bolus of LR at 2, 538 cc, and 3 doses of 0.4 mg of Narcan.  EKG revealed sinus  tachycardia with a ventricular rate of 115 beats per minute with a QT/QTC of 456/630.  CT abdomen and pelvis revealed:[ Prominent small bowel loops in the pelvis measuring up to 33 mm in diameter with mild mucosal thickening suggestive of enteritis and partial small bowel obstruction. Mild associated mesenteric edema. Colonic loops of bowel are decompressed. Mild mucosal thickening of the transverse and right colon.  Correlate clinically for colitis]. Hospital Medicine consulted to admit patient for small-bowel obstruction.  Patient family at bedside are in agreement with treatment plan.  Patient will be admitted under inpatient status.    PCP: Jed Munoz      * No surgery found *      Hospital Course:   10/2: pt reports having bowel movement overnight. Complains of nausea however no vomiting. Will repeat KUB  10/3 She denies nay new complains . She is toelrating liquid diet . Surgery rec no surgical intervention at this time and cot medical tx   10/4 She is tolerating full liquid diet . No BM since admission .  The Blood sugar has been borderline low normal  , IVF changed to  D% 1/2 NS.   10/5 The CBG has been stable , plan to d/c IVF d5 1/2 NS. She is tolerating  full diet . No BM yet but is passing gas . Laxative restarted .   10/6 Pt was seen and examined at bedside . She was determined to be suitable for d/c   She is tolerating a full diet w/o any problem . She had multiple BM yesterday .  The K is low and will be replace with 20 meq po and 40 meq IV  before d/c . Pt request and ambulatory GI referral due to constipation . She will be d/c on po metronidazole and Cipro  for 7 more days .         Goals of Care Treatment Preferences:  Code Status: Full Code      Consults:   Consults (From admission, onward)        Status Ordering Provider     Inpatient consult to General Surgery  Once        Provider:  Enrique Foy MD    Completed LE, DIVYA          Cardiac/Vascular  Mixed hyperlipidemia  Patient is  chronically on statin.will not continue for now. Last Lipid Panel:   Lab Results   Component Value Date    CHOL 119 03/29/2023    HDL 48 03/29/2023    LDLCALC 51 03/29/2023    TRIG 110 03/29/2023    CHOLHDL 31.3 12/12/2012     Plan:  -Continue home medication  -low fat/low calorie diet        Essential hypertension  Currently normotensive. BP usually well controlled per patient with home medications.  Plan:  -Optimize pain control   -Hold home medications (lisinopril, hctz) due to DALIA, titrate as needed   -Monitor BP  -Low salt/cardiac diet when not NPO  -IV hydralazine prn for SBP>160 or DBP>90           Renal/  Acute cystitis with hematuria  Urinalysis revealed:   Lab Results   Component Value Date    COLORU Orange (A) 10/01/2023    APPEARANCEUA Cloudy (A) 10/01/2023    SPECGRAV 1.030 10/01/2023    PHUR 6.0 10/01/2023    PROTEINUA 3+ (A) 10/01/2023    GLUCUA 1+ (A) 10/01/2023    KETONESU Negative 10/01/2023    NITRITE Negative 10/01/2023    UROBILINOGEN >=8.0 (A) 10/01/2023    BILIRUBINUA 1+ (A) 10/01/2023    LEUKOCYTESUR Trace (A) 10/01/2023    RBCUA 8 (H) 10/01/2023    WBCUA 23 (H) 10/01/2023    BACTERIA Occasional 10/01/2023    HYALINECASTS 31 (A) 10/01/2023   Received vanco and cefepime  Plan:  -UA culture  NGTD  -Continue Abx          DALIA (acute kidney injury)  Patient with acute kidney injury likely d/t IVVD/Dehydration. DALIA is currently being treated. Labs reviewed- Renal function/electrolytes with Estimated Creatinine Clearance: 69.6 mL/min (based on SCr of 1.1 mg/dL). according to latest data. Monitor urine output and serial BMP and adjust therapy as needed. Avoid nephrotoxins and renally dose meds for GFR listed above.     S/p  IVF  Resolved     Endocrine  Morbid obesity  Body mass index is 45.86 kg/m². Morbid obesity complicates all aspects of disease management from diagnostic modalities to treatment. Weight loss encouraged and health benefits explained to patient.         GI  *  Enteritis  Leukocytosis of 16.77 with elevated lactic acid level of 3.4 with revealed 3.3.  Plan:  -npo  -ivfs  -analgesics prn  -antiemetics prn  -gensurg consult in am    10/2:  Partial SBO on CT scan  Pt reported having bowel movement overnight  No nausea and vomiting currently  No NG tube in place  Will plan to place NG tube should pt become symptomatic   Surgery consulted on case  Cont NPO  IVF     change IVAB to po   Tolerating diet  Awaiting BM returned       Orthopedic  Chronic low back pain  Compliant with home meds.  Plan:  -continue lyrica  -hold narcotics due to accidental overdose      Other  Accidental overdose  Received 3 doses of 0.4 mg Narcan with response.  Excessive sedation likely a result from taking pain medication in setting of IVVD. Currently voices no complaints at this time.   Plan:  -hold narcotics   -tele monitoring  -monitor vitals  -supplemental O2 as needed  -additional narcan if needed    Elevated lactic acid level  See above        Final Active Diagnoses:    Diagnosis Date Noted POA    PRINCIPAL PROBLEM:  Enteritis [K52.9] 10/02/2023 Yes    Elevated lactic acid level [R79.89] 10/02/2023 Yes    DALIA (acute kidney injury) [N17.9] 10/02/2023 Yes    Acute cystitis with hematuria [N30.01] 10/02/2023 Yes    Chronic low back pain [M54.50, G89.29] 10/02/2023 Yes    Accidental overdose [T50.901A] 10/02/2023 Yes    Morbid obesity [E66.01] 03/06/2018 Yes    Type 2 diabetes mellitus with hyperglycemia, without long-term current use of insulin [E11.65] 09/24/2015 Yes    Mixed hyperlipidemia [E78.2] 05/13/2015 Yes    Essential hypertension [I10] 03/12/2014 Yes      Problems Resolved During this Admission:       Discharged Condition: stable    [place] [facility]    Follow Up:   Follow-up Information     Jed Munoz MD Follow up in 1 week(s).    Specialty: Family Medicine  Contact information:  [address] [place] [address] [phone]                        Patient Instructions:      Ambulatory referral/consult to Gastroenterology   Standing Status: Future   Referral Priority: Routine Referral Type: Consultation   Referral Reason: Specialty Services Required   Requested Specialty: Gastroenterology   Number of Visits Requested: 1     Ambulatory referral/consult to [facility]   Standing Status: Future   Referral Priority: Routine Referral Type: Consultation   Referral Reason: Specialty Services Required   Number of Visits Requested: 1     Ambulatory referral/consult to Home Health   Standing Status: Future   Referral Priority: Routine Referral Type: Home Health   Referral Reason: Specialty Services Required   Requested Specialty: Home Health Services   Number of Visits Requested: 1     Diet Cardiac     Activity as tolerated       Significant Diagnostic Studies: Labs:   BMP:   Recent Labs   Lab 10/06/23  0554         K 2.9*      CO2 33*   BUN 8   CREATININE 1.1   CALCIUM 8.8   , CMP   Recent Labs   Lab 10/06/23  0554      K 2.9*      CO2 33*      BUN 8   CREATININE 1.1   CALCIUM 8.8   ANIONGAP 10    and CBC   Recent Labs   Lab 10/06/23  0554   WBC 4.32   HGB 10.2*   HCT 32.8*          Pending Diagnostic Studies:     None         Medications:  Reconciled Home Medications:      Medication List      START taking these medications    ciprofloxacin HCl 750 MG tablet  Commonly known as: CIPRO  Take 1 tablet (750 mg total) by mouth every 12 (twelve) hours. for 7 days     metroNIDAZOLE 500 MG tablet  Commonly known as: FLAGYL  Take 1 tablet (500 mg total) by mouth every 8 (eight) hours. for 7 days        CONTINUE taking these medications    aspirin 81 MG Chew  Take 81 mg by mouth.     cetirizine 10 MG tablet  Commonly known as: ZYRTEC  Take 10 mg by mouth once daily.     FARXIGA 5 mg Tab tablet  Generic drug: dapagliflozin propanediol  Take 5 mg by mouth every morning.     fluticasone propionate 50 mcg/actuation  nasal spray  Commonly known as: FLONASE  fluticasone propionate 50 mcg/actuation nasal spray,suspension   Spray 2 sprays every day by intranasal route.     hydroCHLOROthiazide 25 MG tablet  Commonly known as: HYDRODIURIL  Take 1 tablet (25 mg total) by mouth once daily.     hyoscyamine 0.125 mg Tab  Commonly known as: ANASPAZ,LEVSIN  Take 125 mcg by mouth 4 (four) times daily.     linaCLOtide 72 mcg Cap capsule  Commonly known as: LINZESS  Take 72 mcg by mouth before breakfast.     lisinopriL 5 MG tablet  Commonly known as: PRINIVIL,ZESTRIL  Take 5 mg by mouth once daily.     metFORMIN 500 MG tablet  Commonly known as: GLUCOPHAGE  Take with meals. Take 1 qam for 1 wk, then bid for 1 wk, then 2 am/1 pm for 1 wk, then 2 bid till finished.     metoclopramide HCl 10 MG tablet  Commonly known as: REGLAN  Take 1 tablet (10 mg total) by mouth every 6 (six) hours.     MOUNJARO 5 mg/0.5 mL Pnij  Generic drug: tirzepatide  Inject 5 mg into the skin once a week.     ondansetron 4 MG Tbdl  Commonly known as: ZOFRAN-ODT     oxyCODONE-acetaminophen  mg per tablet  Commonly known as: PERCOCET  Take 1 tablet by mouth.     pantoprazole 40 MG tablet  Commonly known as: PROTONIX  Take 40 mg by mouth once daily.     pregabalin 75 MG capsule  Commonly known as: LYRICA  Take 75 mg by mouth 2 (two) times daily.     rosuvastatin 20 MG tablet  Commonly known as: CRESTOR  Take 20 mg by mouth nightly.            Indwelling Lines/Drains at time of discharge:   Lines/Drains/Airways     None                 Time spent on the discharge of patient: 30 minutes         Stephan Stone MD  Department of Hospital Medicine  [facility]

## 2023-10-06 NOTE — ASSESSMENT & PLAN NOTE
Patient with acute kidney injury likely d/t IVVD/Dehydration. DALIA is currently being treated. Labs reviewed- Renal function/electrolytes with Estimated Creatinine Clearance: 69.6 mL/min (based on SCr of 1.1 mg/dL). according to latest data. Monitor urine output and serial BMP and adjust therapy as needed. Avoid nephrotoxins and renally dose meds for GFR listed above.     S/p  IVF  Resolved

## 2023-10-06 NOTE — ASSESSMENT & PLAN NOTE
62yo F with enteritis/colitis vs pSBO    - Pt tolerating regular diet, having bowel function. Ok for discharge from surgical standpoint.   - No acute surgical intervention required   - patient not clinically obstructed and not showing symptoms of partial obstruction. Patient with ongoing bowel function, no vomiting and no abdominal distention. Exam and history more consistent with enteritis/colitis or pseudo-obstruction from narcotic abuse   - okay for regular diet from surgical standpoint  - hold narcotics if able  - encouraged OOB, ambulation

## 2023-10-06 NOTE — PROGRESS NOTES
[facility]  General Surgery  Progress Note    Subjective:     History of Present Illness:  62yo F with a PMHx significant for HTN, DM, chronic back pain and chronic narcotic use who presented to the ED for dizziness, syncope and a large bowel movement. Workup in ED positive for narcotic overdose requiring Narcan, leukocytosis of 16k, creatinine of 2.5, lactic acidosis of 3.4, +UA and CT showing thickened small bowel with dilation but no transition point with associated mesenteric edema and thickening of the mucosa of the transverse and right colon concerning for enteritis/colitis. Patient admitted to hospital medicine and surgery consulted for evaluation as CT read concerning for possible pSBO. Patient endorses chronic constipation and abdominal pain, nausea but no emesis. Reports last BM and flatus were yesterday. Only previous abdominal surgical history of tubal ligation and robotic hysterectomy.      Interval History: Had several bowel movements. Passing flatus. No n/v. Tolerating diet. No abdominal pain.    Medications:  Continuous Infusions:  Scheduled Meds:   aluminum-magnesium hydroxide-simethicone  30 mL Oral QID (AC & HS)    ciprofloxacin HCl  750 mg Oral Q12H    glycerin adult  1 suppository Rectal Once    linaCLOtide  290 mcg Oral Before breakfast    metroNIDAZOLE  500 mg Oral Q8H    polyethylene glycol  17 g Oral Daily    potassium chloride  10 mEq Intravenous Q1H    pregabalin  75 mg Oral BID    sucralfate  1 g Oral Q6H     PRN Meds:acetaminophen, acetaminophen, albuterol-ipratropium, aluminum-magnesium hydroxide-simethicone, dextrose 10%, dextrose 10%, glucagon (human recombinant), glucose, glucose, hydrALAZINE, HYDROcodone-acetaminophen, insulin aspart U-100, melatonin, morphine, naloxone, prochlorperazine, simethicone, sodium chloride 0.9%     Review of patient's allergies indicates:  No Known Allergies  Objective:     Vital Signs (Most Recent):  Temp: 98.3 °F (36.8 °C) (10/06/23  Received call from [facility]. Reason for Disposition   SEVERE back pain (e.g., excruciating, unable to do any normal activities) and not improved after pain medicine and CARE ADVICE    Answer Assessment - Initial Assessment Questions  1. ONSET: \"When did the pain begin? \"        4-5 days ago    2. LOCATION: \"Where does it hurt? \" (upper, mid or lower back)      Upper back/neck area, slightly worse on the left side    3. SEVERITY: \"How bad is the pain? \"  (e.g., Scale 1-10; mild, moderate, or severe)    - MILD (1-3): doesn't interfere with normal activities     - MODERATE (4-7): interferes with normal activities or awakens from sleep     - SEVERE (8-10): excruciating pain, unable to do any normal activities       10 out of 10    4. PATTERN: \"Is the pain constant? \" (e.g., yes, no; constant, intermittent)       Constant    5. RADIATION: \"Does the pain shoot into your legs or elsewhere? \"      Denies    6. CAUSE:  \"What do you think is causing the back pain? \"       Pt does not know    7. BACK OVERUSE:  [place] recent lifting of heavy objects, strenuous work or exercise? \"      Denies    8. MEDICATIONS: \"What have you taken so far for the pain? \" (e.g., nothing, acetaminophen, NSAIDS)      Pt has tried Tylenol    9. NEUROLOGIC SYMPTOMS: \"Do you have any weakness, numbness, or problems with bowel/bladder control? \"      Denies     10. OTHER SYMPTOMS: \"Do you have any other symptoms? \" (e.g., fever, abdominal pain, burning with urination, blood in urine)        Denies    11. PREGNANCY: \"Is there any chance you are pregnant? \" (e.g., yes, no; LMP)        Denies    Protocols used: BACK PAIN-ADULT-[place]    Pt is calling with c/o severe neck and upper back pain. Pt denies injury. Pt has tried Tylenol, stretching exercises and applying ice with no relief. Pt did admit to RN that she was trying to buy Percocet off of the street. Recommended that pt be seen today. Pt states that she is unable to come in today d/t work.  Pt 0726)  Pulse: 66 (10/06/23 0726)  Resp: 16 (10/06/23 0726)  BP: (!) 143/69 (10/06/23 0726)  SpO2: 98 % (10/06/23 0726) Vital Signs (24h Range):  Temp:  [97.9 °F (36.6 °C)-99.3 °F (37.4 °C)] 98.3 °F (36.8 °C)  Pulse:  [65-76] 66  Resp:  [16-18] 16  SpO2:  [93 %-98 %] 98 %  BP: (124-144)/(60-69) 143/69     Weight: 125 kg (275 lb 9.2 oz)  Body mass index is 45.86 kg/m².    Intake/Output - Last 3 Shifts         10/04 0700  10/05 0659 10/05 0700  10/06 0659 10/06 0700  10/07 0659    P.O. 360 440     I.V. (mL/kg) 2730.4 (21.8)      IV Piggyback 230      Total Intake(mL/kg) 3320.4 (26.6) 440 (3.5)     Urine (mL/kg/hr) 0 (0)      Emesis/NG output 0      Stool 0      Total Output 0      Net +3320.4 +440            Urine Occurrence 6 x      Stool Occurrence 0 x 2 x              Physical Exam  Constitutional:       Appearance: She is well-developed. She is obese.   HENT:      Head: Normocephalic and atraumatic.   Eyes:      Conjunctiva/sclera: Conjunctivae normal.   Neck:      Thyroid: No thyromegaly.   Cardiovascular:      Rate and Rhythm: Normal rate.   Pulmonary:      Effort: Pulmonary effort is normal. No respiratory distress.   Abdominal:      Comments: Soft, nondistended, nontender; no rebound or guarding; no tympany; well-healed previous port sites   Musculoskeletal:         General: No tenderness. Normal range of motion.      Cervical back: Normal range of motion.   Skin:     General: Skin is warm and dry.      Capillary Refill: Capillary refill takes less than 2 seconds.      Findings: No rash.   Neurological:      Mental Status: She is alert and oriented to person, place, and time.          Significant Labs:  I have reviewed all pertinent lab results within the past 24 hours.  CBC:   Recent Labs   Lab 10/06/23  0554   WBC 4.32   RBC 3.58*   HGB 10.2*   HCT 32.8*      MCV 92   MCH 28.5   MCHC 31.1*     BMP:   Recent Labs   Lab 10/04/23  0753 10/06/23  0554   * 108    144   K 3.6 2.9*    101  also states that she does not want to go the ED or urgent care today. Pt requesting an appointment for Monday morning. Provided pt with care advice. Call soft transferred to [facility] to schedule appointment. Please do not reply to the triage nurse through this encounter. Any subsequent communication should be directly with the patient.   CO2 25 33*   BUN 11 8   CREATININE 1.4 1.1   CALCIUM 8.7 8.8   MG 1.7  --      CMP:   Recent Labs   Lab 10/01/23  1803 10/02/23  0917 10/06/23  0554   *   < > 108   CALCIUM 9.1   < > 8.8   ALBUMIN 3.4*  --   --    PROT 7.3  --   --       < > 144   K 3.5   < > 2.9*   CO2 24   < > 33*      < > 101   BUN 19   < > 8   CREATININE 2.5*   < > 1.1   ALKPHOS 108  --   --    ALT 5*  --   --    AST 10  --   --    BILITOT 1.1*  --   --     < > = values in this interval not displayed.       Significant Diagnostics:  I have reviewed all pertinent imaging results/findings within the past 24 hours.    Assessment/Plan:     * Enteritis  62yo F with enteritis/colitis vs pSBO    - Pt tolerating regular diet, having bowel function. Ok for discharge from surgical standpoint.   - No acute surgical intervention required   - patient not clinically obstructed and not showing symptoms of partial obstruction. Patient with ongoing bowel function, no vomiting and no abdominal distention. Exam and history more consistent with enteritis/colitis or pseudo-obstruction from narcotic abuse   - okay for regular diet from surgical standpoint  - hold narcotics if able  - encouraged OOB, ambulation     Accidental overdose  Care per hospital medicine    Chronic low back pain  Care per hospital medicine    Acute cystitis with hematuria  Care per hospital medicine    DALIA (acute kidney injury)  Care per hospital medicine    Elevated lactic acid level  Resolved    Leukocytosis  Resolved    Mixed hyperlipidemia  Care per hospital medicine    Morbid obesity  Encourage weight loss    Type 2 diabetes mellitus with hyperglycemia, without long-term current use of insulin  Care per hospital medicine    Essential hypertension  Care per hospital medicine        Betty Ma PA-C  General Surgery  [facility]

## 2023-10-06 NOTE — SUBJECTIVE & OBJECTIVE
Interval History: Had several bowel movements. Passing flatus. No n/v. Tolerating diet. No abdominal pain.    Medications:  Continuous Infusions:  Scheduled Meds:   aluminum-magnesium hydroxide-simethicone  30 mL Oral QID (AC & HS)    ciprofloxacin HCl  750 mg Oral Q12H    glycerin adult  1 suppository Rectal Once    linaCLOtide  290 mcg Oral Before breakfast    metroNIDAZOLE  500 mg Oral Q8H    polyethylene glycol  17 g Oral Daily    potassium chloride  10 mEq Intravenous Q1H    pregabalin  75 mg Oral BID    sucralfate  1 g Oral Q6H     PRN Meds:acetaminophen, acetaminophen, albuterol-ipratropium, aluminum-magnesium hydroxide-simethicone, dextrose 10%, dextrose 10%, glucagon (human recombinant), glucose, glucose, hydrALAZINE, HYDROcodone-acetaminophen, insulin aspart U-100, melatonin, morphine, naloxone, prochlorperazine, simethicone, sodium chloride 0.9%     Review of patient's allergies indicates:  No Known Allergies  Objective:     Vital Signs (Most Recent):  Temp: 98.3 °F (36.8 °C) (10/06/23 0726)  Pulse: 66 (10/06/23 0726)  Resp: 16 (10/06/23 0726)  BP: (!) 143/69 (10/06/23 0726)  SpO2: 98 % (10/06/23 0726) Vital Signs (24h Range):  Temp:  [97.9 °F (36.6 °C)-99.3 °F (37.4 °C)] 98.3 °F (36.8 °C)  Pulse:  [65-76] 66  Resp:  [16-18] 16  SpO2:  [93 %-98 %] 98 %  BP: (124-144)/(60-69) 143/69     Weight: 125 kg (275 lb 9.2 oz)  Body mass index is 45.86 kg/m².    Intake/Output - Last 3 Shifts         10/04 0700  10/05 0659 10/05 0700  10/06 0659 10/06 0700  10/07 0659    P.O. 360 440     I.V. (mL/kg) 2730.4 (21.8)      IV Piggyback 230      Total Intake(mL/kg) 3320.4 (26.6) 440 (3.5)     Urine (mL/kg/hr) 0 (0)      Emesis/NG output 0      Stool 0      Total Output 0      Net +3320.4 +440            Urine Occurrence 6 x      Stool Occurrence 0 x 2 x              Physical Exam  Constitutional:       Appearance: She is well-developed. She is obese.   HENT:      Head: Normocephalic and atraumatic.   Eyes:       Conjunctiva/sclera: Conjunctivae normal.   Neck:      Thyroid: No thyromegaly.   Cardiovascular:      Rate and Rhythm: Normal rate.   Pulmonary:      Effort: Pulmonary effort is normal. No respiratory distress.   Abdominal:      Comments: Soft, nondistended, nontender; no rebound or guarding; no tympany; well-healed previous port sites   Musculoskeletal:         General: No tenderness. Normal range of motion.      Cervical back: Normal range of motion.   Skin:     General: Skin is warm and dry.      Capillary Refill: Capillary refill takes less than 2 seconds.      Findings: No rash.   Neurological:      Mental Status: She is alert and oriented to person, place, and time.          Significant Labs:  I have reviewed all pertinent lab results within the past 24 hours.  CBC:   Recent Labs   Lab 10/06/23  0554   WBC 4.32   RBC 3.58*   HGB 10.2*   HCT 32.8*      MCV 92   MCH 28.5   MCHC 31.1*     BMP:   Recent Labs   Lab 10/04/23  0753 10/06/23  0554   * 108    144   K 3.6 2.9*    101   CO2 25 33*   BUN 11 8   CREATININE 1.4 1.1   CALCIUM 8.7 8.8   MG 1.7  --      CMP:   Recent Labs   Lab 10/01/23  1803 10/02/23  0917 10/06/23  0554   *   < > 108   CALCIUM 9.1   < > 8.8   ALBUMIN 3.4*  --   --    PROT 7.3  --   --       < > 144   K 3.5   < > 2.9*   CO2 24   < > 33*      < > 101   BUN 19   < > 8   CREATININE 2.5*   < > 1.1   ALKPHOS 108  --   --    ALT 5*  --   --    AST 10  --   --    BILITOT 1.1*  --   --     < > = values in this interval not displayed.       Significant Diagnostics:  I have reviewed all pertinent imaging results/findings within the past 24 hours.

## 2023-10-07 LAB
BACTERIA BLD CULT: NORMAL
BACTERIA BLD CULT: NORMAL

## 2023-10-09 ENCOUNTER — PES CALL (OUTPATIENT)
Dept: HOME HEALTH SERVICES | Facility: CLINIC | Age: 63
End: 2023-10-09
Payer: COMMERCIAL

## 2023-10-10 ENCOUNTER — PES CALL (OUTPATIENT)
Dept: HOME HEALTH SERVICES | Facility: CLINIC | Age: 63
End: 2023-10-10
Payer: COMMERCIAL

## 2023-10-11 ENCOUNTER — PES CALL (OUTPATIENT)
Dept: HOME HEALTH SERVICES | Facility: CLINIC | Age: 63
End: 2023-10-11
Payer: COMMERCIAL

## 2023-11-17 ENCOUNTER — HOSPITAL ENCOUNTER (EMERGENCY)
Facility: HOSPITAL | Age: 63
Discharge: HOME | End: 2023-11-17
Attending: EMERGENCY MEDICINE
Payer: COMMERCIAL

## 2023-11-17 VITALS
TEMPERATURE: 99 F | WEIGHT: 257.19 LBS | DIASTOLIC BLOOD PRESSURE: 51 MMHG | HEIGHT: 65 IN | HEART RATE: 78 BPM | BODY MASS INDEX: 42.85 KG/M2 | RESPIRATION RATE: 18 BRPM | SYSTOLIC BLOOD PRESSURE: 101 MMHG | OXYGEN SATURATION: 95 %

## 2023-11-17 DIAGNOSIS — N17.9 AKI (ACUTE KIDNEY INJURY): Primary | ICD-10-CM

## 2023-11-17 DIAGNOSIS — R10.13 EPIGASTRIC PAIN: ICD-10-CM

## 2023-11-17 DIAGNOSIS — R19.7 DIARRHEA, UNSPECIFIED TYPE: ICD-10-CM

## 2023-11-17 DIAGNOSIS — R11.2 NAUSEA AND VOMITING, UNSPECIFIED VOMITING TYPE: ICD-10-CM

## 2023-11-17 LAB
ALBUMIN SERPL BCP-MCNC: 3.5 G/DL (ref 3.5–5.2)
ALP SERPL-CCNC: 111 U/L (ref 55–135)
ALT SERPL W/O P-5'-P-CCNC: 5 U/L (ref 10–44)
ANION GAP SERPL CALC-SCNC: 13 MMOL/L (ref 8–16)
ANION GAP SERPL CALC-SCNC: 20 MMOL/L (ref 8–16)
AST SERPL-CCNC: 14 U/L (ref 10–40)
BACTERIA #/AREA URNS HPF: ABNORMAL /HPF
BASOPHILS # BLD AUTO: 0.04 K/UL (ref 0–0.2)
BASOPHILS NFR BLD: 0.4 % (ref 0–1.9)
BILIRUB SERPL-MCNC: 1.8 MG/DL (ref 0.1–1)
BILIRUB UR QL STRIP: ABNORMAL
BUN SERPL-MCNC: 16 MG/DL (ref 8–23)
BUN SERPL-MCNC: 17 MG/DL (ref 8–23)
CALCIUM SERPL-MCNC: 10 MG/DL (ref 8.7–10.5)
CALCIUM SERPL-MCNC: 7.9 MG/DL (ref 8.7–10.5)
CHLORIDE SERPL-SCNC: 105 MMOL/L (ref 95–110)
CHLORIDE SERPL-SCNC: 98 MMOL/L (ref 95–110)
CLARITY UR: ABNORMAL
CO2 SERPL-SCNC: 19 MMOL/L (ref 23–29)
CO2 SERPL-SCNC: 23 MMOL/L (ref 23–29)
COLOR UR: YELLOW
CREAT SERPL-MCNC: 1.4 MG/DL (ref 0.5–1.4)
CREAT SERPL-MCNC: 1.7 MG/DL (ref 0.5–1.4)
DIFFERENTIAL METHOD: ABNORMAL
EOSINOPHIL # BLD AUTO: 0.1 K/UL (ref 0–0.5)
EOSINOPHIL NFR BLD: 1 % (ref 0–8)
ERYTHROCYTE [DISTWIDTH] IN BLOOD BY AUTOMATED COUNT: 13.5 % (ref 11.5–14.5)
EST. GFR  (NO RACE VARIABLE): 33 ML/MIN/1.73 M^2
EST. GFR  (NO RACE VARIABLE): 42 ML/MIN/1.73 M^2
GLUCOSE SERPL-MCNC: 116 MG/DL (ref 70–110)
GLUCOSE SERPL-MCNC: 92 MG/DL (ref 70–110)
GLUCOSE UR QL STRIP: ABNORMAL
HCT VFR BLD AUTO: 45.2 % (ref 37–48.5)
HGB BLD-MCNC: 14.5 G/DL (ref 12–16)
HGB UR QL STRIP: NEGATIVE
HYALINE CASTS #/AREA URNS LPF: 41 /LPF
IMM GRANULOCYTES # BLD AUTO: 0.03 K/UL (ref 0–0.04)
IMM GRANULOCYTES NFR BLD AUTO: 0.3 % (ref 0–0.5)
KETONES UR QL STRIP: NEGATIVE
LEUKOCYTE ESTERASE UR QL STRIP: NEGATIVE
LIPASE SERPL-CCNC: 20 U/L (ref 4–60)
LYMPHOCYTES # BLD AUTO: 1.2 K/UL (ref 1–4.8)
LYMPHOCYTES NFR BLD: 12.9 % (ref 18–48)
MCH RBC QN AUTO: 29.1 PG (ref 27–31)
MCHC RBC AUTO-ENTMCNC: 32.1 G/DL (ref 32–36)
MCV RBC AUTO: 91 FL (ref 82–98)
MICROSCOPIC COMMENT: ABNORMAL
MONOCYTES # BLD AUTO: 0.5 K/UL (ref 0.3–1)
MONOCYTES NFR BLD: 4.9 % (ref 4–15)
NEUTROPHILS # BLD AUTO: 7.8 K/UL (ref 1.8–7.7)
NEUTROPHILS NFR BLD: 80.5 % (ref 38–73)
NITRITE UR QL STRIP: NEGATIVE
NRBC BLD-RTO: 0 /100 WBC
PH UR STRIP: 6 [PH] (ref 5–8)
PLATELET # BLD AUTO: 263 K/UL (ref 150–450)
PLATELET BLD QL SMEAR: ABNORMAL
PMV BLD AUTO: 10.7 FL (ref 9.2–12.9)
POTASSIUM SERPL-SCNC: 3.1 MMOL/L (ref 3.5–5.1)
POTASSIUM SERPL-SCNC: 4.1 MMOL/L (ref 3.5–5.1)
PROT SERPL-MCNC: 8.3 G/DL (ref 6–8.4)
PROT UR QL STRIP: ABNORMAL
RBC # BLD AUTO: 4.99 M/UL (ref 4–5.4)
RBC #/AREA URNS HPF: 0 /HPF (ref 0–4)
SODIUM SERPL-SCNC: 137 MMOL/L (ref 136–145)
SODIUM SERPL-SCNC: 141 MMOL/L (ref 136–145)
SP GR UR STRIP: 1.03 (ref 1–1.03)
SQUAMOUS #/AREA URNS HPF: 2 /HPF
URN SPEC COLLECT METH UR: ABNORMAL
UROBILINOGEN UR STRIP-ACNC: ABNORMAL EU/DL
WBC # BLD AUTO: 9.63 K/UL (ref 3.9–12.7)
WBC #/AREA URNS HPF: 7 /HPF (ref 0–5)
WBC CLUMPS URNS QL MICRO: ABNORMAL
YEAST URNS QL MICRO: ABNORMAL

## 2023-11-17 PROCEDURE — 83690 ASSAY OF LIPASE: CPT | Performed by: NURSE PRACTITIONER

## 2023-11-17 PROCEDURE — 99284 EMERGENCY DEPT VISIT MOD MDM: CPT | Mod: 25

## 2023-11-17 PROCEDURE — 80053 COMPREHEN METABOLIC PANEL: CPT | Performed by: NURSE PRACTITIONER

## 2023-11-17 PROCEDURE — 81000 URINALYSIS NONAUTO W/SCOPE: CPT | Performed by: NURSE PRACTITIONER

## 2023-11-17 PROCEDURE — 96374 THER/PROPH/DIAG INJ IV PUSH: CPT

## 2023-11-17 PROCEDURE — 80048 BASIC METABOLIC PNL TOTAL CA: CPT | Mod: XB | Performed by: EMERGENCY MEDICINE

## 2023-11-17 PROCEDURE — 25000003 PHARM REV CODE 250: Performed by: NURSE PRACTITIONER

## 2023-11-17 PROCEDURE — 85025 COMPLETE CBC W/AUTO DIFF WBC: CPT | Performed by: NURSE PRACTITIONER

## 2023-11-17 PROCEDURE — 96361 HYDRATE IV INFUSION ADD-ON: CPT

## 2023-11-17 PROCEDURE — 96375 TX/PRO/DX INJ NEW DRUG ADDON: CPT

## 2023-11-17 PROCEDURE — 63600175 PHARM REV CODE 636 W HCPCS: Performed by: EMERGENCY MEDICINE

## 2023-11-17 PROCEDURE — C9113 INJ PANTOPRAZOLE SODIUM, VIA: HCPCS | Performed by: EMERGENCY MEDICINE

## 2023-11-17 PROCEDURE — 25000003 PHARM REV CODE 250: Performed by: EMERGENCY MEDICINE

## 2023-11-17 RX ORDER — MORPHINE SULFATE 4 MG/ML
4 INJECTION, SOLUTION INTRAMUSCULAR; INTRAVENOUS
Status: COMPLETED | OUTPATIENT
Start: 2023-11-17 | End: 2023-11-17

## 2023-11-17 RX ORDER — PANTOPRAZOLE SODIUM 40 MG/10ML
40 INJECTION, POWDER, LYOPHILIZED, FOR SOLUTION INTRAVENOUS
Status: COMPLETED | OUTPATIENT
Start: 2023-11-17 | End: 2023-11-17

## 2023-11-17 RX ORDER — ONDANSETRON 4 MG/1
4 TABLET, ORALLY DISINTEGRATING ORAL EVERY 8 HOURS PRN
Qty: 15 TABLET | Refills: 0 | Status: SHIPPED | OUTPATIENT
Start: 2023-11-17 | End: 2023-11-24

## 2023-11-17 RX ORDER — SODIUM CHLORIDE 9 MG/ML
1000 INJECTION, SOLUTION INTRAVENOUS
Status: COMPLETED | OUTPATIENT
Start: 2023-11-17 | End: 2023-11-17

## 2023-11-17 RX ORDER — ONDANSETRON 2 MG/ML
4 INJECTION INTRAMUSCULAR; INTRAVENOUS
Status: COMPLETED | OUTPATIENT
Start: 2023-11-17 | End: 2023-11-17

## 2023-11-17 RX ADMIN — SODIUM CHLORIDE 1000 ML: 9 INJECTION, SOLUTION INTRAVENOUS at 12:11

## 2023-11-17 RX ADMIN — PANTOPRAZOLE SODIUM 40 MG: 40 INJECTION, POWDER, FOR SOLUTION INTRAVENOUS at 02:11

## 2023-11-17 RX ADMIN — POTASSIUM BICARBONATE 40 MEQ: 391 TABLET, EFFERVESCENT ORAL at 06:11

## 2023-11-17 RX ADMIN — MORPHINE SULFATE 4 MG: 4 INJECTION INTRAVENOUS at 02:11

## 2023-11-17 RX ADMIN — ONDANSETRON 4 MG: 2 INJECTION INTRAMUSCULAR; INTRAVENOUS at 02:11

## 2023-11-17 NOTE — FIRST PROVIDER EVALUATION
"Medical screening examination initiated.  I have conducted a focused provider triage encounter, findings are as follows:    Brief history of present illness:  patient presents to ER for abd pain with diarrhea.    Vitals:    11/17/23 1204   Resp: 16   TempSrc: Oral   Weight: 116.7 kg (257 lb 2.7 oz)   Height: 5' 5" (1.651 m)       Pertinent physical exam:  no acute distress    Brief workup plan:  workup    Preliminary workup initiated; this workup will be continued and followed by the physician or advanced practice provider that is assigned to the patient when roomed.  "

## 2023-11-17 NOTE — ED PROVIDER NOTES
SCRIBE #1 NOTE: IMyesha, am scribing for, and in the presence of, Pedro Huggins MD. I have scribed the entire note.       History     Chief Complaint   Patient presents with    Abdominal Pain     Generalized abd pain and diarrhea x 1 week     Review of patient's allergies indicates:  No Known Allergies      History of Present Illness     HPI    11/17/2023, 1:24 PM  History obtained from the patient      History of Present Illness: Roslyn Conde is a 63 y.o. female patient with a PMHx of HTN and DM 2 who presents to the Emergency Department for evaluation of generalized abdominal pain which onset 2 days ago. Pt states she has been experiencing n/v/d also over the past 2 days. Pt states she was seen in the hospital for similar sxs last month. Symptoms are constant and moderate in severity. No mitigating or exacerbating factors reported. Patient denies any cp, sob, weakness, dysuria, fever, and all other sxs at this time. No prior Tx reported . No further complaints or concerns at this time.       Arrival mode: Personal vehicle      PCP: Jed Munoz MD        Past Medical History:  Past Medical History:   Diagnosis Date    Chronic back pain     Diabetes mellitus, type 2     Hypertension        Past Surgical History:  Past Surgical History:   Procedure Laterality Date    ANKLE SURGERY      right    BACK SURGERY      HYSTERECTOMY      TONSILLECTOMY      TOTAL HIP ARTHROPLASTY Left     TUBAL LIGATION           Family History:  No family history on file.    Social History:  Social History     Tobacco Use    Smoking status: Never    Smokeless tobacco: Never   Substance and Sexual Activity    Alcohol use: No    Drug use: No    Sexual activity: Not on file        Review of Systems     Review of Systems   Constitutional:  Negative for fever.   HENT:  Negative for sore throat.    Respiratory:  Negative for shortness of breath.    Cardiovascular:  Negative for chest pain.   Gastrointestinal:  Positive  for abdominal pain (generalized), diarrhea, nausea and vomiting.   Genitourinary:  Negative for dysuria.   Musculoskeletal:  Negative for back pain.   Skin:  Negative for rash.   Neurological:  Negative for weakness.   Hematological:  Does not bruise/bleed easily.   All other systems reviewed and are negative.       Physical Exam     Initial Vitals [11/17/23 1204]   BP Pulse Resp Temp SpO2   121/81 (!) 135 16 99.5 °F (37.5 °C) 100 %      MAP       --          Physical Exam  Nursing Notes and Vital Signs Reviewed.  Constitutional: Patient is in no apparent distress. Well-developed and well-nourished.  Head: Atraumatic. Normocephalic.  Eyes: PERRL. EOM intact. Conjunctivae are not pale. No scleral icterus.  ENT: Mucous membranes are moist. Oropharynx is clear and symmetric.    Neck: Supple. Full ROM. No lymphadenopathy.  Cardiovascular: Regular rate. Regular rhythm. No murmurs, rubs, or gallops. Distal pulses are 2+ and symmetric.  Pulmonary/Chest: No respiratory distress. Clear to auscultation bilaterally. No wheezing or rales.  Abdominal: Soft and non-distended.  There is diffuse abdominal tenderness.  No rebound, guarding, or rigidity. Good bowel sounds.  Genitourinary: No CVA tenderness  Musculoskeletal: Moves all extremities. No obvious deformities. No edema. No calf tenderness.  Skin: Warm and dry.  Neurological:  Alert, awake, and appropriate.  Normal speech.  No acute focal neurological deficits are appreciated.  Psychiatric: Normal affect. Good eye contact. Appropriate in content.     ED Course   Critical Care    Date/Time: 11/17/2023 1:35 PM    Performed by: Pedro Huggins MD  Authorized by: Pedro Huggins MD  Direct patient critical care time: 10 minutes  Additional history critical care time: 6 minutes  Ordering / reviewing critical care time: 5 minutes  Documentation critical care time: 6 minutes  Consulting other physicians critical care time: 5 minutes  Consult with family critical care time: 5  "minutes  Total critical care time (exclusive of procedural time) : 37 minutes  Critical care time was exclusive of separately billable procedures and treating other patients and teaching time.  Critical care was necessary to treat or prevent imminent or life-threatening deterioration of the following conditions: DALIA.  Critical care was time spent personally by me on the following activities: development of treatment plan with patient or surrogate, blood draw for specimens, discussions with consultants, interpretation of cardiac output measurements, evaluation of patient's response to treatment, examination of patient, obtaining history from patient or surrogate, ordering and performing treatments and interventions, ordering and review of laboratory studies, ordering and review of radiographic studies, pulse oximetry, re-evaluation of patient's condition and review of old charts.        ED Vital Signs:  Vitals:    11/17/23 1204 11/17/23 1400 11/17/23 1445 11/17/23 2010   BP: 121/81 (!) 112/53  (!) 101/51   Pulse: (!) 135 80  78   Resp: 16 16 16 18   Temp: 99.5 °F (37.5 °C) 99 °F (37.2 °C)  99.2 °F (37.3 °C)   TempSrc: Oral Oral  Oral   SpO2: 100% (!) 93%  95%   Weight: 116.7 kg (257 lb 2.7 oz)      Height: 5' 5" (1.651 m)          Abnormal Lab Results:  Labs Reviewed   CBC W/ AUTO DIFFERENTIAL - Abnormal; Notable for the following components:       Result Value    Gran # (ANC) 7.8 (*)     Gran % 80.5 (*)     Lymph % 12.9 (*)     All other components within normal limits    Narrative:     Release to patient->Immediate   COMPREHENSIVE METABOLIC PANEL - Abnormal; Notable for the following components:    CO2 19 (*)     Glucose 116 (*)     Creatinine 1.7 (*)     Total Bilirubin 1.8 (*)     ALT 5 (*)     eGFR 33 (*)     Anion Gap 20 (*)     All other components within normal limits    Narrative:     Release to patient->Immediate   URINALYSIS, REFLEX TO URINE CULTURE - Abnormal; Notable for the following components:    " Appearance, UA Hazy (*)     Protein, UA 1+ (*)     Glucose, UA 4+ (*)     Bilirubin (UA) 1+ (*)     Urobilinogen, UA 2.0-3.0 (*)     All other components within normal limits    Narrative:     Specimen Source->Urine   BASIC METABOLIC PANEL - Abnormal; Notable for the following components:    Potassium 3.1 (*)     Calcium 7.9 (*)     eGFR 42 (*)     All other components within normal limits   URINALYSIS MICROSCOPIC - Abnormal; Notable for the following components:    WBC, UA 7 (*)     WBC Clumps, UA Many (*)     Hyaline Casts, UA 41 (*)     All other components within normal limits    Narrative:     Specimen Source->Urine   LIPASE    Narrative:     Release to patient->Immediate        All Lab Results:  Results for orders placed or performed during the hospital encounter of 11/17/23   CBC auto differential   Result Value Ref Range    WBC 9.63 3.90 - 12.70 K/uL    RBC 4.99 4.00 - 5.40 M/uL    Hemoglobin 14.5 12.0 - 16.0 g/dL    Hematocrit 45.2 37.0 - 48.5 %    MCV 91 82 - 98 fL    MCH 29.1 27.0 - 31.0 pg    MCHC 32.1 32.0 - 36.0 g/dL    RDW 13.5 11.5 - 14.5 %    Platelets 263 150 - 450 K/uL    MPV 10.7 9.2 - 12.9 fL    Immature Granulocytes 0.3 0.0 - 0.5 %    Gran # (ANC) 7.8 (H) 1.8 - 7.7 K/uL    Immature Grans (Abs) 0.03 0.00 - 0.04 K/uL    Lymph # 1.2 1.0 - 4.8 K/uL    Mono # 0.5 0.3 - 1.0 K/uL    Eos # 0.1 0.0 - 0.5 K/uL    Baso # 0.04 0.00 - 0.20 K/uL    nRBC 0 0 /100 WBC    Gran % 80.5 (H) 38.0 - 73.0 %    Lymph % 12.9 (L) 18.0 - 48.0 %    Mono % 4.9 4.0 - 15.0 %    Eosinophil % 1.0 0.0 - 8.0 %    Basophil % 0.4 0.0 - 1.9 %    Platelet Estimate Appears normal     Differential Method Automated    Comprehensive metabolic panel   Result Value Ref Range    Sodium 137 136 - 145 mmol/L    Potassium 4.1 3.5 - 5.1 mmol/L    Chloride 98 95 - 110 mmol/L    CO2 19 (L) 23 - 29 mmol/L    Glucose 116 (H) 70 - 110 mg/dL    BUN 17 8 - 23 mg/dL    Creatinine 1.7 (H) 0.5 - 1.4 mg/dL    Calcium 10.0 8.7 - 10.5 mg/dL    Total  Protein 8.3 6.0 - 8.4 g/dL    Albumin 3.5 3.5 - 5.2 g/dL    Total Bilirubin 1.8 (H) 0.1 - 1.0 mg/dL    Alkaline Phosphatase 111 55 - 135 U/L    AST 14 10 - 40 U/L    ALT 5 (L) 10 - 44 U/L    eGFR 33 (A) >60 mL/min/1.73 m^2    Anion Gap 20 (H) 8 - 16 mmol/L   Lipase   Result Value Ref Range    Lipase 20 4 - 60 U/L   Urinalysis, Reflex to Urine Culture Urine, Clean Catch    Specimen: Urine   Result Value Ref Range    Specimen UA Urine, Clean Catch     Color, UA Yellow Yellow, Straw, Breanna    Appearance, UA Hazy (A) Clear    pH, UA 6.0 5.0 - 8.0    Specific Gravity, UA 1.030 1.005 - 1.030    Protein, UA 1+ (A) Negative    Glucose, UA 4+ (A) Negative    Ketones, UA Negative Negative    Bilirubin (UA) 1+ (A) Negative    Occult Blood UA Negative Negative    Nitrite, UA Negative Negative    Urobilinogen, UA 2.0-3.0 (A) <2.0 EU/dL    Leukocytes, UA Negative Negative   Basic metabolic panel   Result Value Ref Range    Sodium 141 136 - 145 mmol/L    Potassium 3.1 (L) 3.5 - 5.1 mmol/L    Chloride 105 95 - 110 mmol/L    CO2 23 23 - 29 mmol/L    Glucose 92 70 - 110 mg/dL    BUN 16 8 - 23 mg/dL    Creatinine 1.4 0.5 - 1.4 mg/dL    Calcium 7.9 (L) 8.7 - 10.5 mg/dL    Anion Gap 13 8 - 16 mmol/L    eGFR 42 (A) >60 mL/min/1.73 m^2   Urinalysis Microscopic   Result Value Ref Range    RBC, UA 0 0 - 4 /hpf    WBC, UA 7 (H) 0 - 5 /hpf    WBC Clumps, UA Many (A) None-Rare    Bacteria Rare None-Occ /hpf    Yeast, UA None None    Squam Epithel, UA 2 /hpf    Hyaline Casts, UA 41 (A) 0-1/lpf /lpf    Microscopic Comment SEE COMMENT         Imaging Results:  Imaging Results              CT Abdomen Pelvis  Without Contrast (Final result)  Result time 11/17/23 15:28:55   Procedure changed from CT Abdomen Pelvis With IV Contrast     Final result by Sara Maldonado MD (11/17/23 15:28:55)                   Impression:      Small bowel enteritis suspected findings similar to prior exam      Electronically signed by: Sara Tyson  Joel  Date:    11/17/2023  Time:    15:28               Narrative:    EXAMINATION:  CT ABDOMEN PELVIS WITHOUT CONTRAST    CLINICAL HISTORY:  Abdominal abscess/infection suspected;    TECHNIQUE:  Low dose axial images, sagittal and coronal reformations were obtained from the lung bases to the pubic symphysis.  Contrast was not administered.    COMPARISON:  October 1, 2023    FINDINGS:  Unenhanced liver and spleen stable    Pancreas stable    Kidneys without hydronephrosis or adverse finding    No aortic aneurysm    The is small bowel appears thick walled with stranding or unorganized fluid in the associated mesentery and small volume generalized ascites.  No overt signs of appendicitis.    Urinary bladder decompressed                                            ED Discussion       6:15 PM: Reassessed pt at this time. Discussed with pt all pertinent ED information and results. Discussed pt dx and plan of tx. Gave pt all f/u and return to the ED instructions. All questions and concerns were addressed at this time. Pt expresses understanding of information and instructions, and is comfortable with plan to discharge. Pt is stable for discharge.    I discussed with patient and/or family/caretaker that evaluation in the ED does not suggest any emergent or life threatening medical conditions requiring immediate intervention beyond what was provided in the ED, and I believe patient is safe for discharge.  Regardless, an unremarkable evaluation in the ED does not preclude the development or presence of a serious of life threatening condition. As such, patient was instructed to return immediately for any worsening or change in current symptoms.        Medical Decision Making  Amount and/or Complexity of Data Reviewed  Labs: ordered. Decision-making details documented in ED Course.  Radiology: ordered. Decision-making details documented in ED Course.    Risk  Prescription drug management.                ED  Medication(s):  Medications   0.9%  NaCl infusion (0 mLs Intravenous Stopped 11/17/23 1700)   morphine injection 4 mg (4 mg Intravenous Given 11/17/23 1445)   pantoprazole injection 40 mg (40 mg Intravenous Given 11/17/23 1441)   ondansetron injection 4 mg (4 mg Intravenous Given 11/17/23 1439)   potassium bicarbonate disintegrating tablet 40 mEq (40 mEq Oral Given 11/17/23 1815)       Discharge Medication List as of 11/17/2023  6:11 PM        START taking these medications    Details   potassium bicarbonate (K-LYTE) disintegrating tablet Take 1 tablet (25 mEq total) by mouth once daily. for 5 days, Starting Fri 11/17/2023, Until Wed 11/22/2023, Print              Follow-up Information       Jed Munoz MD In 2 days.    Specialty: Family Medicine  Why: For re-evaluation and further treatment  Contact information:  [address] [facility] [address] [phone]               [facility] - Emergency Dept.. Go today.    Specialty: Emergency Medicine  Why: If symptoms worsen, For re-evaluation and further treatment, As needed  Contact information:  [address] [facility] [address] [phone]                               Scribe Attestation:   Scribe #1: I performed the above scribed service and the documentation accurately describes the services I performed. I attest to the accuracy of the note.     Attending:   Physician Attestation Statement for Scribe #1: I, Pedro Huggins MD, personally performed the services described in this documentation, as scribed by Myesha Rogers, in my presence, and it is both accurate and complete.           Clinical Impression       ICD-10-CM ICD-9-CM   1. DALIA (acute kidney injury)  N17.9 584.9   2. Nausea and vomiting, unspecified vomiting type  R11.2 787.01   3. Diarrhea, unspecified type  R19.7 787.91   4. Epigastric pain  R10.13 789.06       Disposition:   Disposition: Discharged  Condition: Stable        Pedro Huggins MD  11/17/23 1443

## 2024-01-08 PROBLEM — N17.9 AKI (ACUTE KIDNEY INJURY): Status: RESOLVED | Noted: 2023-10-02 | Resolved: 2024-01-08

## 2024-02-06 NOTE — SUBJECTIVE & OBJECTIVE
-- DO NOT REPLY / DO NOT REPLY ALL --  -- Message is from Engagement Center Operations ([facility]) --    ONLY TO BE USED WITHIN A REFILL MEDICATION ENCOUNTER    Med Refill  Is the patient currently having any symptoms?: No/Non-Emergent symptoms    Name of medication requested: See pended med    Is this the first request for the medication in the last 48 hours?: Yes      Patient is requesting a medication refill - medication is on active list    Full name of the provider who ordered the medication: Shayna Gilmore    [facility] site name / Account # for provider: [facility]    Preferred Pharmacy: Pharmacy  [facility] [address] [facility] [address] [address] [address]    Patient confirmed the above pharmacy as correct?  Yes    Caller Information         Type Contact Phone/Fax    02/06/2024 02:13 PM CST Phone (Incoming) Mary Cardozo (Self) [phone] (M)            Alternative phone number: no     Can a detailed message be left?: Yes         Interval History: No acute events overnight. No nausea. Feels hungry.    Medications:  Continuous Infusions:   lactated ringers 125 mL/hr at 10/02/23 2128     Scheduled Meds:   aluminum-magnesium hydroxide-simethicone  30 mL Oral QID (AC & HS)    ceFEPime (MAXIPIME) IVPB  2 g Intravenous Q12H    pregabalin  75 mg Oral BID    sucralfate  1 g Oral Q6H     PRN Meds:acetaminophen, acetaminophen, albuterol-ipratropium, aluminum-magnesium hydroxide-simethicone, dextrose 10%, dextrose 10%, glucagon (human recombinant), glucose, glucose, hydrALAZINE, HYDROcodone-acetaminophen, insulin aspart U-100, melatonin, morphine, naloxone, polyethylene glycol, prochlorperazine, simethicone, sodium chloride 0.9%     Review of patient's allergies indicates:  No Known Allergies  Objective:     Vital Signs (Most Recent):  Temp: 97.9 °F (36.6 °C) (10/03/23 0348)  Pulse: 69 (10/03/23 0500)  Resp: 18 (10/03/23 0348)  BP: (!) 101/55 (10/03/23 0348)  SpO2: (!) 92 % (10/03/23 0348) Vital Signs (24h Range):  Temp:  [97.4 °F (36.3 °C)-98.7 °F (37.1 °C)] 97.9 °F (36.6 °C)  Pulse:  [58-88] 69  Resp:  [16-18] 18  SpO2:  [92 %-98 %] 92 %  BP: ()/(47-55) 101/55     Weight: 125 kg (275 lb 9.2 oz)  Body mass index is 45.86 kg/m².    Intake/Output - Last 3 Shifts         10/01 0700  10/02 0659 10/02 0700  10/03 0659 10/03 0700  10/04 0659    P.O. 0      IV Piggyback 4138      Total Intake(mL/kg) 4138 (33.1)      Urine (mL/kg/hr)  1500 (0.5)     Total Output  1500     Net +4138 -1500            Urine Occurrence  3 x              Physical Exam  Constitutional:       Appearance: She is well-developed. She is obese.   HENT:      Head: Normocephalic and atraumatic.   Eyes:      Conjunctiva/sclera: Conjunctivae normal.   Neck:      Thyroid: No thyromegaly.   Cardiovascular:      Rate and Rhythm: Normal rate.   Pulmonary:      Effort: Pulmonary effort is normal. No respiratory distress.   Abdominal:      Comments: Soft, nondistended, nontender; no rebound  or guarding; no tympany; well-healed previous port sites   Musculoskeletal:         General: No tenderness. Normal range of motion.      Cervical back: Normal range of motion.   Skin:     General: Skin is warm and dry.      Capillary Refill: Capillary refill takes less than 2 seconds.      Findings: No rash.   Neurological:      Mental Status: She is alert and oriented to person, place, and time.          Significant Labs:  I have reviewed all pertinent lab results within the past 24 hours.  CBC:   Recent Labs   Lab 10/02/23  0918   WBC 10.59   RBC 3.75*   HGB 11.2*   HCT 35.0*      MCV 93   MCH 29.9   MCHC 32.0     BMP:   Recent Labs   Lab 10/01/23  1803 10/02/23  0917   * 98    138   K 3.5 3.5    102   CO2 24 24   BUN 19 22   CREATININE 2.5* 2.4*   CALCIUM 9.1 7.9*   MG 1.6  --      CMP:   Recent Labs   Lab 10/01/23  1803 10/02/23  0917   * 98   CALCIUM 9.1 7.9*   ALBUMIN 3.4*  --    PROT 7.3  --     138   K 3.5 3.5   CO2 24 24    102   BUN 19 22   CREATININE 2.5* 2.4*   ALKPHOS 108  --    ALT 5*  --    AST 10  --    BILITOT 1.1*  --      LFTs:   Recent Labs   Lab 10/01/23  1803   ALT 5*   AST 10   ALKPHOS 108   BILITOT 1.1*   PROT 7.3   ALBUMIN 3.4*       Significant Diagnostics:  I have reviewed all pertinent imaging results/findings within the past 24 hours.    KUB yesterday:  FINDINGS:  Mild constipation.  Nonobstructive bowel gas pattern is noted. No radiopaque kidney calculus is identified.  Stimulating device overlies the right pelvis.  No evidence of organomegaly.  The bones demonstrate moderate degenerative changes within the lower lumbar region.  Postoperative changes seen within the lumbar spine.  Left hip replacement without dislocation.  Diffuse osteopenia the bones are otherwise intact.     Impression:     Nonobstructive bowel gas pattern.

## 2024-02-08 ENCOUNTER — HOSPITAL ENCOUNTER (EMERGENCY)
Facility: HOSPITAL | Age: 64
Discharge: HOME | End: 2024-02-08
Attending: EMERGENCY MEDICINE
Payer: COMMERCIAL

## 2024-02-08 VITALS
WEIGHT: 259.5 LBS | RESPIRATION RATE: 12 BRPM | DIASTOLIC BLOOD PRESSURE: 64 MMHG | OXYGEN SATURATION: 97 % | SYSTOLIC BLOOD PRESSURE: 130 MMHG | BODY MASS INDEX: 43.18 KG/M2 | TEMPERATURE: 98 F | HEART RATE: 67 BPM
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
## 2024-02-08 DIAGNOSIS — M79.639 FOREARM PAIN: ICD-10-CM

## 2024-02-08 DIAGNOSIS — R07.81 RIB PAIN ON LEFT SIDE: ICD-10-CM

## 2024-02-08 PROCEDURE — 25000003 PHARM REV CODE 250: Performed by: EMERGENCY MEDICINE

## 2024-02-08 PROCEDURE — 99284 EMERGENCY DEPT VISIT MOD MDM: CPT | Mod: 25

## 2024-02-08 RX ORDER — HYDROCODONE BITARTRATE AND ACETAMINOPHEN 5; 325 MG/1; MG/1
1 TABLET ORAL
Status: COMPLETED | OUTPATIENT
Start: 2024-02-08 | End: 2024-02-08

## 2024-02-08 RX ORDER — HYDROCODONE BITARTRATE AND ACETAMINOPHEN 7.5; 325 MG/1; MG/1
1 TABLET ORAL EVERY 6 HOURS PRN
Qty: 11 TABLET | Refills: 0 | Status: SHIPPED | OUTPATIENT
Start: 2024-02-08 | End: 2024-02-13

## 2024-02-08 RX ADMIN — HYDROCODONE BITARTRATE AND ACETAMINOPHEN 1 TABLET: 5; 325 TABLET ORAL at 08:02

## 2024-02-09 NOTE — ED PROVIDER NOTES
"SCRIBE #1 NOTE: I, Sandy Yoly, am scribing for, and in the presence of, Bayron Branham MD. I have scribed the entire note.       History     Chief Complaint   Patient presents with    Fall     Pt BIBA from home for fall onto L side off steps "approx knee high", c/o L rib pain. Hx of bilat knee replacements. Denies dizziness/SOB/CP. Denies bldthnrs/LOC/head injury. Pt received 238mcg Fentanyl via 20gRarm PTA     Review of patient's allergies indicates:  No Known Allergies      History of Present Illness     HPI    2/8/2024, 7:22 PM  History obtained from the patient      History of Present Illness: Roslyn Conde is a 63 y.o. female patient with a PMHx of HTN, DM type 2, BL knee replacements, and chronic back pain who presents to the Emergency Department secondary to a fall. Pt c/o left rib, left knee, and left forearm pain. Pt denies hitting head and LOC. Symptoms are constant and moderate in severity. No mitigating or exacerbating factors reported. No associated sxs reported. Patient denies any N/V/D, HA, dysuria, fever, and all other sxs at this time. Prior Tx includes 238mcg Fentanyl via 20gRarm per EMS PTA. No further complaints or concerns at this time.       Arrival mode: EMS     PCP: Babak Brannon MD        Past Medical History:  Past Medical History:   Diagnosis Date    Chronic back pain     Diabetes mellitus, type 2     Hypertension        Past Surgical History:  Past Surgical History:   Procedure Laterality Date    ANKLE SURGERY      right    BACK SURGERY      HYSTERECTOMY      TONSILLECTOMY      TOTAL HIP ARTHROPLASTY Left     TUBAL LIGATION           Family History:  No family history on file.    Social History:  Social History     Tobacco Use    Smoking status: Never    Smokeless tobacco: Never   Substance and Sexual Activity    Alcohol use: No    Drug use: No    Sexual activity: Not on file        Review of Systems     Review of Systems   Constitutional:  Negative for chills and " fever.   HENT:  Negative for sore throat.    Respiratory:  Negative for shortness of breath.    Cardiovascular:  Negative for chest pain.   Gastrointestinal:  Negative for diarrhea, nausea and vomiting.   Genitourinary:  Negative for dysuria.   Musculoskeletal:         Left rib pain. Left knee pain. Left forearm pain.   Skin:  Negative for rash.   Neurological:  Negative for syncope, weakness and headaches.   Hematological:  Does not bruise/bleed easily.   All other systems reviewed and are negative.       Physical Exam     Initial Vitals [02/08/24 1907]   BP Pulse Resp Temp SpO2   (!) 168/89 70 18 98.5 °F (36.9 °C) 96 %      MAP       --          Physical Exam  Nursing Notes and Vital Signs Reviewed.  Constitutional: Patient is in no acute distress. Well-developed and well-nourished.  Head: Atraumatic. Normocephalic.  Eyes: PERRL. EOM intact. Conjunctivae are not pale. No scleral icterus.  ENT: Mucous membranes are moist. Oropharynx is clear and symmetric.    Neck: Supple. Full ROM. No lymphadenopathy.  Cardiovascular: Regular rate. Regular rhythm. No murmurs, rubs, or gallops. Distal pulses are 2+ and symmetric.  Pulmonary/Chest: No respiratory distress. Clear to auscultation bilaterally. No wheezing or rales.  Abdominal: Soft and non-distended.  There is no tenderness.  No rebound, guarding, or rigidity.  Genitourinary: No CVA tenderness  Musculoskeletal: Moves all extremities. No obvious deformities. No edema. L rib tenderness, no deformity.   Skin: Warm and dry.  Neurological:  Alert, awake, and appropriate.  Normal speech.  No acute focal neurological deficits are appreciated.  Psychiatric: Normal affect. Good eye contact. Appropriate in content.     ED Course   Procedures  ED Vital Signs:  Vitals:    02/08/24 1907 02/08/24 1945 02/08/24 2058 02/08/24 2130   BP: (!) 168/89 (!) 151/75  130/64   Pulse: 70 69  67   Resp: 18 13 20 12   Temp: 98.5 °F (36.9 °C)   98 °F (36.7 °C)   TempSrc: Oral   Oral   SpO2: 96%  97%         Abnormal Lab Results:  Labs Reviewed - No data to display     All Lab Results:  None    Imaging Results:  Imaging Results              X-Ray Forearm Left (Final result)  Result time 02/08/24 21:08:41      Final result by Judson Pineda MD (02/08/24 21:08:41)                   Impression:      No acute fracture or dislocation      Electronically signed by: Judson Pineda  Date:    02/08/2024  Time:    21:08               Narrative:    EXAMINATION:  XR FOREARM LEFT    CLINICAL HISTORY:  Pain in unspecified forearm    TECHNIQUE:  AP and lateral views of the left forearm were performed.    COMPARISON:  None    FINDINGS:  No acute fracture or dislocation.  Normal bone mineral density.  Posterior olecranon spurring                                       X-Ray Knee Complete 4 or More Views Left (Final result)  Result time 02/08/24 21:07:44      Final result by Judson Pineda MD (02/08/24 21:07:44)                   Impression:      As above      Electronically signed by: Judson Pineda  Date:    02/08/2024  Time:    21:07               Narrative:    EXAMINATION:  XR KNEE COMP 4 OR MORE VIEWS LEFT    CLINICAL HISTORY:  knee pain;    TECHNIQUE:  AP, lateral, and Merchant views of the left knee were performed.    COMPARISON:  None    FINDINGS:  No acute fracture or dislocation.  Prominent soft tissues.  Status post left knee prosthesis.                                       X-Ray Ribs 2 View Left (Final result)  Result time 02/08/24 21:11:34      Final result by Judson Pineda MD (02/08/24 21:11:34)                   Impression:      As above      Electronically signed by: Judson Pineda  Date:    02/08/2024  Time:    21:11               Narrative:    EXAMINATION:  XR RIBS 2 VIEW LEFT    CLINICAL HISTORY:  Pleurodynia    TECHNIQUE:  Two views of the left ribs were performed.    COMPARISON:  None.    FINDINGS:  No definite displaced osseous abnormality.  No pneumothorax.  Recommend follow-up if symptoms persist.                                             The Emergency Provider reviewed the vital signs and test results, which are outlined above.     ED Discussion   9:22 PM: Reassessed pt at this time. Discussed with pt all pertinent ED information and results. Discussed pt dx and plan of tx. Gave pt all f/u and return to the ED instructions. All questions and concerns were addressed at this time. Pt expresses understanding of information and instructions, and is comfortable with plan to discharge. Pt is stable for discharge.    I discussed with patient and/or family/caretaker that evaluation in the ED does not suggest any emergent or life threatening medical conditions requiring immediate intervention beyond what was provided in the ED, and I believe patient is safe for discharge.  Regardless, an unremarkable evaluation in the ED does not preclude the development or presence of a serious of life threatening condition. As such, patient was instructed to return immediately for any worsening or change in current symptoms.            Medical Decision Making  Mechanical fall prior to arrival.  Injured her left knee, left forearm, and left ribs  DDx: Arm pain, rib pain, fracture    Amount and/or Complexity of Data Reviewed  Radiology: ordered. Decision-making details documented in ED Course.     Details: No acute findings  Discussion of management or test interpretation with external provider(s): Feeling better after treatment in the ED, and ready to go home.     Risk  Prescription drug management.                ED Medication(s):  Medications   HYDROcodone-acetaminophen 5-325 mg per tablet 1 tablet (1 tablet Oral Given 2/8/24 2058)       New Prescriptions    HYDROCODONE-ACETAMINOPHEN (NORCO) 7.5-325 MG PER TABLET    Take 1 tablet by mouth every 6 (six) hours as needed.        Follow-up Information       Babak Brannon MD.    Specialty: Family Medicine  Contact information:  [address] [address]  [address]  [phone]                                 Scribe Attestation:   Scribe #1: I performed the above scribed service and the documentation accurately describes the services I performed. I attest to the accuracy of the note.     Attending:   Physician Attestation Statement for Scribe #1: I, Bayron Branham MD, personally performed the services described in this documentation, as scribed by Sandy Frederick, in my presence, and it is both accurate and complete.           Clinical Impression       ICD-10-CM ICD-9-CM   1. Fall, initial encounter  W19.XXXA E888.9   2. Forearm pain  M79.639 729.5   3. Rib pain on left side  R07.81 786.50       Disposition:   Disposition: Discharged  Condition: Stable        Bayron Branham MD  02/08/24 2144

## 2024-04-05 ENCOUNTER — HOSPITAL ENCOUNTER (EMERGENCY)
Facility: HOSPITAL | Age: 64
Discharge: HOME | End: 2024-04-05
Attending: EMERGENCY MEDICINE
Payer: COMMERCIAL

## 2024-04-05 VITALS
SYSTOLIC BLOOD PRESSURE: 149 MMHG | OXYGEN SATURATION: 97 % | TEMPERATURE: 99 F | WEIGHT: 277.75 LBS | BODY MASS INDEX: 46.22 KG/M2 | HEART RATE: 81 BPM | RESPIRATION RATE: 18 BRPM | DIASTOLIC BLOOD PRESSURE: 70 MMHG

## 2024-04-05 DIAGNOSIS — K52.9 ENTERITIS: Primary | ICD-10-CM

## 2024-04-05 DIAGNOSIS — R10.84 GENERALIZED ABDOMINAL DISCOMFORT: ICD-10-CM

## 2024-04-05 LAB
ALBUMIN SERPL BCP-MCNC: 3.4 G/DL (ref 3.5–5.2)
ALP SERPL-CCNC: 137 U/L (ref 55–135)
ALT SERPL W/O P-5'-P-CCNC: 5 U/L (ref 10–44)
ANION GAP SERPL CALC-SCNC: 11 MMOL/L (ref 8–16)
AST SERPL-CCNC: 9 U/L (ref 10–40)
BACTERIA #/AREA URNS HPF: NORMAL /HPF
BASOPHILS # BLD AUTO: 0.01 K/UL (ref 0–0.2)
BASOPHILS NFR BLD: 0.1 % (ref 0–1.9)
BILIRUB SERPL-MCNC: 1.6 MG/DL (ref 0.1–1)
BILIRUB UR QL STRIP: NEGATIVE
BUN SERPL-MCNC: 14 MG/DL (ref 8–23)
CALCIUM SERPL-MCNC: 9.2 MG/DL (ref 8.7–10.5)
CHLORIDE SERPL-SCNC: 106 MMOL/L (ref 95–110)
CLARITY UR: CLEAR
CO2 SERPL-SCNC: 21 MMOL/L (ref 23–29)
COLOR UR: YELLOW
CREAT SERPL-MCNC: 0.9 MG/DL (ref 0.5–1.4)
DIFFERENTIAL METHOD BLD: ABNORMAL
EOSINOPHIL # BLD AUTO: 0.1 K/UL (ref 0–0.5)
EOSINOPHIL NFR BLD: 0.7 % (ref 0–8)
ERYTHROCYTE [DISTWIDTH] IN BLOOD BY AUTOMATED COUNT: 14.7 % (ref 11.5–14.5)
EST. GFR  (NO RACE VARIABLE): >60 ML/MIN/1.73 M^2
GLUCOSE SERPL-MCNC: 134 MG/DL (ref 70–110)
GLUCOSE UR QL STRIP: ABNORMAL
HCT VFR BLD AUTO: 39.9 % (ref 37–48.5)
HGB BLD-MCNC: 12.7 G/DL (ref 12–16)
HGB UR QL STRIP: NEGATIVE
IMM GRANULOCYTES # BLD AUTO: 0.03 K/UL (ref 0–0.04)
IMM GRANULOCYTES NFR BLD AUTO: 0.4 % (ref 0–0.5)
KETONES UR QL STRIP: NEGATIVE
LEUKOCYTE ESTERASE UR QL STRIP: ABNORMAL
LIPASE SERPL-CCNC: 22 U/L (ref 4–60)
LYMPHOCYTES # BLD AUTO: 0.2 K/UL (ref 1–4.8)
LYMPHOCYTES NFR BLD: 2.7 % (ref 18–48)
MCH RBC QN AUTO: 27.4 PG (ref 27–31)
MCHC RBC AUTO-ENTMCNC: 31.8 G/DL (ref 32–36)
MCV RBC AUTO: 86 FL (ref 82–98)
MICROSCOPIC COMMENT: NORMAL
MONOCYTES # BLD AUTO: 0.3 K/UL (ref 0.3–1)
MONOCYTES NFR BLD: 4 % (ref 4–15)
NEUTROPHILS # BLD AUTO: 7.6 K/UL (ref 1.8–7.7)
NEUTROPHILS NFR BLD: 92.1 % (ref 38–73)
NITRITE UR QL STRIP: NEGATIVE
NRBC BLD-RTO: 0 /100 WBC
PH UR STRIP: 6 [PH] (ref 5–8)
PLATELET # BLD AUTO: 108 K/UL (ref 150–450)
PMV BLD AUTO: 10.1 FL (ref 9.2–12.9)
POCT GLUCOSE: 87 MG/DL (ref 70–110)
POTASSIUM SERPL-SCNC: 3.8 MMOL/L (ref 3.5–5.1)
PROT SERPL-MCNC: 7.7 G/DL (ref 6–8.4)
PROT UR QL STRIP: ABNORMAL
RBC # BLD AUTO: 4.63 M/UL (ref 4–5.4)
RBC #/AREA URNS HPF: 4 /HPF (ref 0–4)
SODIUM SERPL-SCNC: 138 MMOL/L (ref 136–145)
SP GR UR STRIP: 1.03 (ref 1–1.03)
SQUAMOUS #/AREA URNS HPF: 20 /HPF
URN SPEC COLLECT METH UR: ABNORMAL
UROBILINOGEN UR STRIP-ACNC: NEGATIVE EU/DL
WBC # BLD AUTO: 8.24 K/UL (ref 3.9–12.7)
WBC #/AREA URNS HPF: 5 /HPF (ref 0–5)
YEAST URNS QL MICRO: NORMAL

## 2024-04-05 PROCEDURE — 99285 EMERGENCY DEPT VISIT HI MDM: CPT | Mod: 25

## 2024-04-05 PROCEDURE — 81000 URINALYSIS NONAUTO W/SCOPE: CPT | Performed by: REGISTERED NURSE

## 2024-04-05 PROCEDURE — 25000003 PHARM REV CODE 250: Performed by: REGISTERED NURSE

## 2024-04-05 PROCEDURE — 85025 COMPLETE CBC W/AUTO DIFF WBC: CPT | Performed by: REGISTERED NURSE

## 2024-04-05 PROCEDURE — 80053 COMPREHEN METABOLIC PANEL: CPT | Performed by: REGISTERED NURSE

## 2024-04-05 PROCEDURE — 96361 HYDRATE IV INFUSION ADD-ON: CPT

## 2024-04-05 PROCEDURE — 25500020 PHARM REV CODE 255: Performed by: STUDENT IN AN ORGANIZED HEALTH CARE EDUCATION/TRAINING PROGRAM

## 2024-04-05 PROCEDURE — 96375 TX/PRO/DX INJ NEW DRUG ADDON: CPT

## 2024-04-05 PROCEDURE — 63600175 PHARM REV CODE 636 W HCPCS: Performed by: REGISTERED NURSE

## 2024-04-05 PROCEDURE — 82962 GLUCOSE BLOOD TEST: CPT

## 2024-04-05 PROCEDURE — 83690 ASSAY OF LIPASE: CPT | Performed by: REGISTERED NURSE

## 2024-04-05 PROCEDURE — 96374 THER/PROPH/DIAG INJ IV PUSH: CPT | Mod: 59

## 2024-04-05 PROCEDURE — 63600175 PHARM REV CODE 636 W HCPCS: Performed by: STUDENT IN AN ORGANIZED HEALTH CARE EDUCATION/TRAINING PROGRAM

## 2024-04-05 RX ORDER — MORPHINE SULFATE 4 MG/ML
4 INJECTION, SOLUTION INTRAMUSCULAR; INTRAVENOUS
Status: COMPLETED | OUTPATIENT
Start: 2024-04-05 | End: 2024-04-05

## 2024-04-05 RX ORDER — ONDANSETRON HYDROCHLORIDE 2 MG/ML
4 INJECTION, SOLUTION INTRAVENOUS
Status: COMPLETED | OUTPATIENT
Start: 2024-04-05 | End: 2024-04-05

## 2024-04-05 RX ORDER — HYOSCYAMINE SULFATE 0.125 MG
125 TABLET ORAL EVERY 4 HOURS PRN
Qty: 15 TABLET | Refills: 0 | Status: SHIPPED | OUTPATIENT
Start: 2024-04-05 | End: 2024-04-10

## 2024-04-05 RX ORDER — ONDANSETRON 4 MG/1
4 TABLET, FILM COATED ORAL EVERY 6 HOURS
Qty: 12 TABLET | Refills: 0 | Status: SHIPPED | OUTPATIENT
Start: 2024-04-05

## 2024-04-05 RX ADMIN — ONDANSETRON 4 MG: 2 INJECTION INTRAMUSCULAR; INTRAVENOUS at 03:04

## 2024-04-05 RX ADMIN — IOHEXOL 100 ML: 350 INJECTION, SOLUTION INTRAVENOUS at 04:04

## 2024-04-05 RX ADMIN — MORPHINE SULFATE 4 MG: 4 INJECTION INTRAVENOUS at 05:04

## 2024-04-05 RX ADMIN — SODIUM CHLORIDE 1000 ML: 9 INJECTION, SOLUTION INTRAVENOUS at 03:04

## 2024-04-05 NOTE — DISCHARGE INSTRUCTIONS
Please follow-up with your PCP. Please call on the next business day to schedule this appointment.    Please take the Levsin as needed for abdominal cramping and diarrhea and the zofran as needed for nausea/vomiting. Please understand that all medications have potential side effects. Please read the package insert for all medications that we have prescribed/recommended. Please call your primary care physician or return to the ER with any questions or concerns you may have. Please take only the dosage that is prescribed.    Return to the ER with new/worsening symptoms, fevers/chills, nausea/vomiting, chest pain, or any other concerns.

## 2024-04-05 NOTE — FIRST PROVIDER EVALUATION
Medical screening examination initiated.  I have conducted a focused provider triage encounter, findings are as follows:    Brief history of present illness:  Abdominal pain that began this morning, complaining of nausea/vomiting/diarrhea    Vitals:    04/05/24 1328   BP: (!) 182/82   BP Location: Right arm   Patient Position: Sitting   Pulse: 75   Resp: 18   Temp: 99.1 °F (37.3 °C)   TempSrc: Oral   SpO2: 97%   Weight: 126 kg (277 lb 12.5 oz)       Pertinent physical exam:  No acute distress, vital signs stable    Brief workup plan:  Workup    Preliminary workup initiated; this workup will be continued and followed by the physician or advanced practice provider that is assigned to the patient when roomed.

## 2024-04-05 NOTE — ED PROVIDER NOTES
SCRIBE #1 NOTE: I, Pro Terrell, am scribing for, and in the presence of, Aneta Oropeza MD. I have scribed the HPI, ROS, and PEx.     SCRIBE #2 NOTE: I, Melly Crabtree, am scribing for, and in the presence of,  Johnathon Jha MD. I have scribed the remaining portions of the note not scribed by Scribe #1.     History      Chief Complaint   Patient presents with    Abdominal Pain     Pt c/o n/v/d with abdominal pain since this morning. Pt states she's been having this symptoms chronically        Review of patient's allergies indicates:  No Known Allergies     HPI   HPI    4/5/2024, 3:42 PM   History obtained from the patient      History of Present Illness: Roslyn Conde is a 63 y.o. female patient who presents to the Emergency Department for n/v/d, onset 1 week PTA. Pt states that she has had similar sxs over the past 2 years, but that they have worsened over the past week. Symptoms are episodic and moderate in severity. No mitigating or exacerbating factors reported. Associated sxs include generalized abdominal pain. Patient denies any fever, chills, SOB, CP, weakness, numbness, dizziness, headache, and all other sxs at this time. Pt is scheduled to follow with Gastroenterology on 4/17/24. No further complaints or concerns at this time.     Arrival mode: Personal vehicle    PCP: Babak Brannon MD       Past Medical History:  Past Medical History:   Diagnosis Date    Chronic back pain     Diabetes mellitus, type 2     Hypertension        Past Surgical History:  Past Surgical History:   Procedure Laterality Date    ANKLE SURGERY      right    BACK SURGERY      HYSTERECTOMY      TONSILLECTOMY      TOTAL HIP ARTHROPLASTY Left     TUBAL LIGATION           Family History:  No family history on file.    Social History:  Social History     Tobacco Use    Smoking status: Never    Smokeless tobacco: Never   Substance and Sexual Activity    Alcohol use: No    Drug use: No    Sexual activity: Not on file       ROS    Review of Systems   Constitutional:  Negative for chills and fever.   HENT:  Negative for sore throat.    Respiratory:  Negative for shortness of breath.    Cardiovascular:  Negative for chest pain.   Gastrointestinal:  Positive for abdominal pain (generalized), diarrhea, nausea and vomiting.   Genitourinary:  Negative for dysuria.   Musculoskeletal:  Negative for back pain.   Skin:  Negative for rash.   Neurological:  Negative for dizziness, weakness, numbness and headaches.   Hematological:  Does not bruise/bleed easily.   All other systems reviewed and are negative.    Physical Exam      Initial Vitals [04/05/24 1328]   BP Pulse Resp Temp SpO2   (!) 182/82 75 18 99.1 °F (37.3 °C) 97 %      MAP       --          Physical Exam  Nursing Notes and Vital Signs Reviewed.  Constitutional: Patient is in no acute distress. Morbidly obese.  Head: Atraumatic. Normocephalic.  Eyes: PERRL. EOM intact. Conjunctivae are not pale. No scleral icterus.  ENT: Mucous membranes are moist. Oropharynx is clear and symmetric.    Neck: Supple. Full ROM.   Cardiovascular: Regular rate. Regular rhythm. No murmurs, rubs, or gallops. Distal pulses are 2+ and symmetric.  Pulmonary/Chest: No respiratory distress. Clear to auscultation bilaterally. No wheezing or rales.  Abdominal: Soft and non-distended.  There is no tenderness.  No rebound, guarding, or rigidity.   Musculoskeletal: Moves all extremities. No obvious deformities. No edema.  Skin: Warm and dry.  Neurological:  Alert, awake, and appropriate.  Normal speech.  No acute focal neurological deficits are appreciated.  Psychiatric: Normal affect. Good eye contact. Appropriate in content.    ED Course    Procedures  ED Vital Signs:  Vitals:    04/05/24 1328 04/05/24 1618 04/05/24 1704 04/05/24 1707   BP: (!) 182/82 (!) 145/66 (!) 145/67    Pulse: 75 79 82    Resp: 18   18   Temp: 99.1 °F (37.3 °C)      TempSrc: Oral      SpO2: 97% 97% 97%    Weight: 126 kg (277 lb 12.5 oz)        04/05/24 1823   BP: (!) 149/70   Pulse: 81   Resp: 18   Temp:    TempSrc:    SpO2: 97%   Weight:        Abnormal Lab Results:  Labs Reviewed   CBC W/ AUTO DIFFERENTIAL - Abnormal; Notable for the following components:       Result Value    MCHC 31.8 (*)     RDW 14.7 (*)     Platelets 108 (*)     Lymph # 0.2 (*)     Gran % 92.1 (*)     Lymph % 2.7 (*)     All other components within normal limits   COMPREHENSIVE METABOLIC PANEL - Abnormal; Notable for the following components:    CO2 21 (*)     Glucose 134 (*)     Albumin 3.4 (*)     Total Bilirubin 1.6 (*)     Alkaline Phosphatase 137 (*)     AST 9 (*)     ALT 5 (*)     All other components within normal limits   URINALYSIS, REFLEX TO URINE CULTURE - Abnormal; Notable for the following components:    Protein, UA Trace (*)     Glucose, UA 4+ (*)     Leukocytes, UA 2+ (*)     All other components within normal limits    Narrative:     Specimen Source->Urine   LIPASE   URINALYSIS MICROSCOPIC    Narrative:     Specimen Source->Urine   POCT GLUCOSE   POCT GLUCOSE MONITORING CONTINUOUS        All Lab Results:  Results for orders placed or performed during the hospital encounter of 04/05/24   CBC Auto Differential   Result Value Ref Range    WBC 8.24 3.90 - 12.70 K/uL    RBC 4.63 4.00 - 5.40 M/uL    Hemoglobin 12.7 12.0 - 16.0 g/dL    Hematocrit 39.9 37.0 - 48.5 %    MCV 86 82 - 98 fL    MCH 27.4 27.0 - 31.0 pg    MCHC 31.8 (L) 32.0 - 36.0 g/dL    RDW 14.7 (H) 11.5 - 14.5 %    Platelets 108 (L) 150 - 450 K/uL    MPV 10.1 9.2 - 12.9 fL    Immature Granulocytes 0.4 0.0 - 0.5 %    Gran # (ANC) 7.6 1.8 - 7.7 K/uL    Immature Grans (Abs) 0.03 0.00 - 0.04 K/uL    Lymph # 0.2 (L) 1.0 - 4.8 K/uL    Mono # 0.3 0.3 - 1.0 K/uL    Eos # 0.1 0.0 - 0.5 K/uL    Baso # 0.01 0.00 - 0.20 K/uL    nRBC 0 0 /100 WBC    Gran % 92.1 (H) 38.0 - 73.0 %    Lymph % 2.7 (L) 18.0 - 48.0 %    Mono % 4.0 4.0 - 15.0 %    Eosinophil % 0.7 0.0 - 8.0 %    Basophil % 0.1 0.0 - 1.9 %    Differential Method  Automated    Comprehensive Metabolic Panel   Result Value Ref Range    Sodium 138 136 - 145 mmol/L    Potassium 3.8 3.5 - 5.1 mmol/L    Chloride 106 95 - 110 mmol/L    CO2 21 (L) 23 - 29 mmol/L    Glucose 134 (H) 70 - 110 mg/dL    BUN 14 8 - 23 mg/dL    Creatinine 0.9 0.5 - 1.4 mg/dL    Calcium 9.2 8.7 - 10.5 mg/dL    Total Protein 7.7 6.0 - 8.4 g/dL    Albumin 3.4 (L) 3.5 - 5.2 g/dL    Total Bilirubin 1.6 (H) 0.1 - 1.0 mg/dL    Alkaline Phosphatase 137 (H) 55 - 135 U/L    AST 9 (L) 10 - 40 U/L    ALT 5 (L) 10 - 44 U/L    eGFR >60 >60 mL/min/1.73 m^2    Anion Gap 11 8 - 16 mmol/L   Lipase   Result Value Ref Range    Lipase 22 4 - 60 U/L   Urinalysis, Reflex to Urine Culture Urine, Clean Catch    Specimen: Urine   Result Value Ref Range    Specimen UA Urine, Clean Catch     Color, UA Yellow Yellow, Straw, Breanna    Appearance, UA Clear Clear    pH, UA 6.0 5.0 - 8.0    Specific Gravity, UA 1.030 1.005 - 1.030    Protein, UA Trace (A) Negative    Glucose, UA 4+ (A) Negative    Ketones, UA Negative Negative    Bilirubin (UA) Negative Negative    Occult Blood UA Negative Negative    Nitrite, UA Negative Negative    Urobilinogen, UA Negative <2.0 EU/dL    Leukocytes, UA 2+ (A) Negative   Urinalysis Microscopic   Result Value Ref Range    RBC, UA 4 0 - 4 /hpf    WBC, UA 5 0 - 5 /hpf    Bacteria None None-Occ /hpf    Yeast, UA None None    Squam Epithel, UA 20 /hpf    Microscopic Comment SEE COMMENT    POCT glucose   Result Value Ref Range    POCT Glucose 87 70 - 110 mg/dL     Imaging Results:  Imaging Results              CT Abdomen Pelvis With IV Contrast NO Oral Contrast (Final result)  Result time 04/05/24 17:17:57      Final result by Sara Maldonado MD (04/05/24 17:17:57)                   Impression:      Enteritis suspected with mild ascites but no thiago obstructive findings      Electronically signed by: Sara Maldonado  Date:    04/05/2024  Time:    17:17               Narrative:    EXAMINATION:  CT ABDOMEN  PELVIS WITH IV CONTRAST    CLINICAL HISTORY:  Bowel obstruction suspected;    TECHNIQUE:  Low dose axial images, sagittal and coronal reformations were obtained from the lung bases to the pubic symphysis following the IV administration of 100 mL of Omnipaque 350 iterative technique automated exposure control    COMPARISON:  November 2023    FINDINGS:  Liver stable size, periportal edema suspected and scant ascites.    Pancreas stable    Spleen stable    Adrenal glands stable with small fat density lesion left    Kidneys without hydronephrosis or adverse change    No aortic aneurysm    No thiago obstructive bowel findings.  Small bowel may be hyperemic with mild fluid in the associated mesentery.  No pericecal inflammatory change seen.    Urinary bladder decompressed    Mild anasarca suspected                                       X-Ray Abdomen Flat And Erect (Final result)  Result time 04/05/24 16:33:12      Final result by Damon Martin MD (04/05/24 16:33:12)                   Impression:      Nonobstructive gas pattern.  Question mild ileus with mildly distended bowel loops mid to upper abdomen.      Electronically signed by: Damon Martin MD  Date:    04/05/2024  Time:    16:33               Narrative:    EXAMINATION:  XR ABDOMEN FLAT AND ERECT    CLINICAL HISTORY:  Generalized abdominal pain    TECHNIQUE:  Routine exam.    COMPARISON:  October 2, 2023    FINDINGS:  Negative for bowel obstruction.Some air-filled loops of bowel in the upper to mid abdomen with mild distension    No suspicious calcifications.    No acute osseous finding.                                              The Emergency Provider reviewed the vital signs and test results, which are outlined above.    ED Discussion     4:00 PM: Dr. Oropeza transfers care of patient to Dr. Jha pending lab and imaging results.    6:20 PM: Reassessed pt at this time.  Pt states her condition has  at this time. Discussed with pt all pertinent ED information  and results. Discussed pt dx and plan of tx. Gave pt all f/u and return to the ED instructions. All questions and concerns were addressed at this time. Pt expresses understanding of information and instructions, and is comfortable with plan to discharge. Pt is stable for discharge.    I discussed with patient and/or family/caretaker that evaluation in the ED does not suggest any emergent or life threatening medical conditions requiring immediate intervention beyond what was provided in the ED, and I believe patient is safe for discharge.  Regardless, an unremarkable evaluation in the ED does not preclude the development or presence of a serious of life threatening condition. As such, patient was instructed to return immediately for any worsening or change in current symptoms.      ED Course as of 04/05/24 1915 Fri Apr 05, 2024 1736 WBC: 8.24 [MC]   1736 Hemoglobin: 12.7 [MC]   1736 Leukocyte Esterase, UA(!): 2+ []   1740 Patient reports improvement in her symptoms. Her CT scan demonstrates an enteritis. 2+ leuks, no bacteria, no nitirites on urine with no urinary symptoms - likely not a UTI. We will plan to discharge home with levsin and zofran for continued symptoms. [MC]      ED Course User Index  [MC] Johnathon Jha MD       ED Medication(s):  Medications   ondansetron injection 4 mg (4 mg Intravenous Given 4/5/24 1508)   sodium chloride 0.9% bolus 1,000 mL 1,000 mL (1,000 mLs Intravenous New Bag 4/5/24 1508)   iohexoL (OMNIPAQUE 350) injection 100 mL (100 mLs Intravenous Given 4/5/24 1648)   morphine injection 4 mg (4 mg Intravenous Given 4/5/24 1707)     Discharge Medication List as of 4/5/2024  6:18 PM        START taking these medications    Details   ondansetron (ZOFRAN) 4 MG tablet Take 1 tablet (4 mg total) by mouth every 6 (six) hours., Starting Fri 4/5/2024, Normal           Medical Decision Making    Medical Decision Making  Ddx includes bowel obstruction, ileus, intraabdominal mass,  intraabdominal infection, among others, UTI, enteritis, colitis, among others.    Patient's workup today is remarkable for enteritis on CT scan.  No bowel obstruction appreciated.  Patient is still passing stool.  Patient can be treated outpatient with Levsin and Zofran.  No indication for antibiotics at this time.  Blood work otherwise unremarkable with no abnormalities that require hospitalization today.  Patient understands and agrees with the plan of discharge.  Patient had improvement in her symptoms here in the emergency department    Amount and/or Complexity of Data Reviewed  Labs: ordered. Decision-making details documented in ED Course.  Radiology: ordered. Decision-making details documented in ED Course.    Risk  Prescription drug management.                Scribe Attestation:   Scribe #1: I performed the above scribed service and the documentation accurately describes the services I performed. I attest to the accuracy of the note.    Attending:   Physician Attestation Statement for Scribe #1: I, Aneta Oropeza MD, personally performed the services described in this documentation, as scribed by Pro Terrell, in my presence, and it is both accurate and complete.       Scribe Attestation:   Scribe #2: I performed the above scribed service and the documentation accurately describes the services I performed. I attest to the accuracy of the note.    Attending Attestation:           Physician Attestation for Scribe:    Physician Attestation Statement for Scribe #2: I, Johnathon Jha MD, reviewed documentation, as scribed by Melly Crabtree in my presence, and it is both accurate and complete. I also acknowledge and confirm the content of the note done by Scribe #1.          Clinical Impression       ICD-10-CM ICD-9-CM   1. Enteritis  K52.9 558.9   2. Generalized abdominal discomfort  R10.84 789.07       Disposition:   Disposition: Discharged  Condition: Stable         Johnathon Jha MD  04/05/24 1915